# Patient Record
Sex: MALE | Race: WHITE | NOT HISPANIC OR LATINO | Employment: OTHER | URBAN - METROPOLITAN AREA
[De-identification: names, ages, dates, MRNs, and addresses within clinical notes are randomized per-mention and may not be internally consistent; named-entity substitution may affect disease eponyms.]

---

## 2017-06-13 ENCOUNTER — HOSPITAL ENCOUNTER (OUTPATIENT)
Dept: LAB | Facility: HOSPITAL | Age: 82
Setting detail: SPECIMEN
Discharge: HOME | End: 2017-06-13
Attending: INTERNAL MEDICINE | Admitting: INTERNAL MEDICINE

## 2017-06-13 LAB
ALBUMIN SERPL-MCNC: 3.6 G/DL (ref 3.5–4.8)
ALBUMIN/GLOB SERPL: 1 {RATIO} (ref 1–1.7)
ALP SERPL-CCNC: 55 IU/L (ref 32–91)
ALT SERPL-CCNC: 13 IU/L (ref 17–63)
ANION GAP SERPL CALC-SCNC: 11.7 MMOL/L (ref 10–20)
AST SERPL-CCNC: 16 IU/L (ref 15–41)
BILIRUB SERPL-MCNC: 0.9 MG/DL (ref 0.3–1.2)
BUN SERPL-MCNC: 15 MG/DL (ref 8–20)
BUN/CREAT SERPL: 10.7 (ref 6.2–20.3)
CALCIUM SERPL-MCNC: 9 MG/DL (ref 8.9–10.3)
CHLORIDE SERPL-SCNC: 104 MMOL/L (ref 101–111)
CHOLEST SERPL-MCNC: 104 MG/DL
CHOLEST/HDLC SERPL: 3.2 {RATIO}
CONV CO2: 28 MMOL/L (ref 22–32)
CONV LDL CHOLESTEROL DIRECT: 59 MG/DL (ref 0–100)
CONV MICROALBUM.,U,RANDOM: 15 MG/L
CONV TOTAL PROTEIN: 7.3 G/DL (ref 6.1–7.9)
CREAT 24H UR-MCNC: 56.2 MG/DL
CREAT UR-MCNC: 1.4 MG/DL (ref 0.7–1.2)
GLOBULIN UR ELPH-MCNC: 3.7 G/DL (ref 2.5–3.8)
GLUCOSE SERPL-MCNC: 89 MG/DL (ref 65–99)
HDLC SERPL-MCNC: 33 MG/DL
LDLC/HDLC SERPL: 1.8 {RATIO}
LIPID INTERPRETATION: ABNORMAL
MICROALBUMIN/CREAT UR: 26.7 UG/MG
POTASSIUM SERPL-SCNC: 3.7 MMOL/L (ref 3.6–5.1)
SODIUM SERPL-SCNC: 140 MMOL/L (ref 136–144)
TRIGL SERPL-MCNC: 66 MG/DL
VLDLC SERPL CALC-MCNC: 12 MG/DL

## 2017-10-27 ENCOUNTER — HOSPITAL ENCOUNTER (OUTPATIENT)
Dept: LAB | Facility: HOSPITAL | Age: 82
Setting detail: SPECIMEN
Discharge: HOME | End: 2017-10-27
Attending: INTERNAL MEDICINE | Admitting: INTERNAL MEDICINE

## 2017-10-27 LAB
ALBUMIN SERPL-MCNC: 3.5 G/DL (ref 3.5–4.8)
ALBUMIN/GLOB SERPL: 0.8 {RATIO} (ref 1–1.7)
ALP SERPL-CCNC: 61 IU/L (ref 32–91)
ALT SERPL-CCNC: 10 IU/L (ref 17–63)
ANION GAP SERPL CALC-SCNC: 10.9 MMOL/L (ref 10–20)
AST SERPL-CCNC: 14 IU/L (ref 15–41)
BILIRUB SERPL-MCNC: 0.7 MG/DL (ref 0.3–1.2)
BUN SERPL-MCNC: 18 MG/DL (ref 8–20)
BUN/CREAT SERPL: 13.8 (ref 6.2–20.3)
CALCIUM SERPL-MCNC: 8.9 MG/DL (ref 8.9–10.3)
CHLORIDE SERPL-SCNC: 101 MMOL/L (ref 101–111)
CONV CO2: 28 MMOL/L (ref 22–32)
CONV TOTAL PROTEIN: 7.7 G/DL (ref 6.1–7.9)
CREAT UR-MCNC: 1.3 MG/DL (ref 0.7–1.2)
GLOBULIN UR ELPH-MCNC: 4.2 G/DL (ref 2.5–3.8)
GLUCOSE SERPL-MCNC: 256 MG/DL (ref 65–99)
POTASSIUM SERPL-SCNC: 3.9 MMOL/L (ref 3.6–5.1)
SODIUM SERPL-SCNC: 136 MMOL/L (ref 136–144)

## 2017-12-17 ENCOUNTER — ON CAMPUS - OUTPATIENT (OUTPATIENT)
Dept: URBAN - METROPOLITAN AREA HOSPITAL 85 | Facility: HOSPITAL | Age: 82
End: 2017-12-17

## 2017-12-17 DIAGNOSIS — K62.5 HEMORRHAGE OF ANUS AND RECTUM: ICD-10-CM

## 2017-12-17 PROCEDURE — 99203 OFFICE O/P NEW LOW 30 MIN: CPT | Performed by: INTERNAL MEDICINE

## 2017-12-18 ENCOUNTER — HOSPITAL ENCOUNTER (OUTPATIENT)
Dept: LAB | Facility: HOSPITAL | Age: 82
Setting detail: SPECIMEN
Discharge: HOME | End: 2017-12-18
Attending: FAMILY MEDICINE | Admitting: FAMILY MEDICINE

## 2017-12-18 LAB
BASOPHILS # BLD AUTO: 0.1 10*3/UL (ref 0–0.2)
BASOPHILS NFR BLD AUTO: 1 % (ref 0–2)
DIFFERENTIAL METHOD BLD: (no result)
EOSINOPHIL # BLD AUTO: 0.2 10*3/UL (ref 0–0.3)
EOSINOPHIL # BLD AUTO: 2 % (ref 0–3)
ERYTHROCYTE [DISTWIDTH] IN BLOOD BY AUTOMATED COUNT: 16 % (ref 11.5–14.5)
HCT VFR BLD AUTO: 31.5 % (ref 40–54)
HGB BLD-MCNC: 9.7 G/DL (ref 14–18)
LYMPHOCYTES # BLD AUTO: 1.5 10*3/UL (ref 0.8–4.8)
LYMPHOCYTES NFR BLD AUTO: 19 % (ref 18–42)
MCH RBC QN AUTO: 23.8 PG (ref 26–32)
MCHC RBC AUTO-ENTMCNC: 30.7 G/DL (ref 32–36)
MCV RBC AUTO: 77.8 FL (ref 80–94)
MONOCYTES # BLD AUTO: 0.5 10*3/UL (ref 0.1–1.3)
MONOCYTES NFR BLD AUTO: 7 % (ref 2–11)
NEUTROPHILS # BLD AUTO: 5.5 10*3/UL (ref 2.3–8.6)
NEUTROPHILS NFR BLD AUTO: 71 % (ref 50–75)
NRBC BLD AUTO-RTO: 0 /100{WBCS}
NRBC/RBC NFR BLD MANUAL: 0 10*3/UL
PLATELET # BLD AUTO: 240 10*3/UL (ref 150–450)
PMV BLD AUTO: 9.5 FL (ref 7.4–10.4)
RBC # BLD AUTO: 4.05 10*6/UL (ref 4.6–6)
WBC # BLD AUTO: 7.7 10*3/UL (ref 4.5–11.5)

## 2017-12-21 ENCOUNTER — OFFICE (OUTPATIENT)
Dept: URBAN - METROPOLITAN AREA CLINIC 64 | Facility: CLINIC | Age: 82
End: 2017-12-21

## 2017-12-21 VITALS
HEIGHT: 70 IN | DIASTOLIC BLOOD PRESSURE: 69 MMHG | SYSTOLIC BLOOD PRESSURE: 121 MMHG | HEART RATE: 60 BPM | WEIGHT: 203 LBS

## 2017-12-21 DIAGNOSIS — D50.9 IRON DEFICIENCY ANEMIA, UNSPECIFIED: ICD-10-CM

## 2017-12-21 DIAGNOSIS — I48.91 UNSPECIFIED ATRIAL FIBRILLATION: ICD-10-CM

## 2017-12-21 DIAGNOSIS — Z79.01 LONG TERM (CURRENT) USE OF ANTICOAGULANTS: ICD-10-CM

## 2017-12-21 DIAGNOSIS — K62.5 HEMORRHAGE OF ANUS AND RECTUM: ICD-10-CM

## 2017-12-21 PROCEDURE — 99214 OFFICE O/P EST MOD 30 MIN: CPT | Performed by: NURSE PRACTITIONER

## 2017-12-21 RX ORDER — FERROUS SULFATE TAB EC 325 MG (65 MG FE EQUIVALENT) 325 (65 FE) MG
650 TABLET DELAYED RESPONSE ORAL
Qty: 180 | Refills: 1 | Status: ACTIVE
Start: 2017-12-21

## 2018-05-02 ENCOUNTER — HOSPITAL ENCOUNTER (OUTPATIENT)
Dept: LAB | Facility: HOSPITAL | Age: 83
Setting detail: SPECIMEN
Discharge: HOME | End: 2018-05-02
Attending: INTERNAL MEDICINE | Admitting: INTERNAL MEDICINE

## 2018-05-02 LAB
ALBUMIN SERPL-MCNC: 3.5 G/DL (ref 3.5–4.8)
ALBUMIN/GLOB SERPL: 0.9 {RATIO} (ref 1–1.7)
ALP SERPL-CCNC: 61 IU/L (ref 32–91)
ALT SERPL-CCNC: 10 IU/L (ref 17–63)
ANION GAP SERPL CALC-SCNC: 8.8 MMOL/L (ref 10–20)
AST SERPL-CCNC: 12 IU/L (ref 15–41)
BILIRUB SERPL-MCNC: 1 MG/DL (ref 0.3–1.2)
BUN SERPL-MCNC: 13 MG/DL (ref 8–20)
BUN/CREAT SERPL: 10 (ref 6.2–20.3)
CALCIUM SERPL-MCNC: 8.9 MG/DL (ref 8.9–10.3)
CHLORIDE SERPL-SCNC: 104 MMOL/L (ref 101–111)
CHOLEST SERPL-MCNC: 105 MG/DL
CHOLEST/HDLC SERPL: 3.4 {RATIO}
CONV CO2: 28 MMOL/L (ref 22–32)
CONV LDL CHOLESTEROL DIRECT: 63 MG/DL (ref 0–100)
CONV TOTAL PROTEIN: 7.6 G/DL (ref 6.1–7.9)
CREAT UR-MCNC: 1.3 MG/DL (ref 0.7–1.2)
GLOBULIN UR ELPH-MCNC: 4.1 G/DL (ref 2.5–3.8)
GLUCOSE SERPL-MCNC: 239 MG/DL (ref 65–99)
HDLC SERPL-MCNC: 31 MG/DL
LDLC/HDLC SERPL: 2.1 {RATIO}
LIPID INTERPRETATION: ABNORMAL
POTASSIUM SERPL-SCNC: 3.8 MMOL/L (ref 3.6–5.1)
SODIUM SERPL-SCNC: 137 MMOL/L (ref 136–144)
TRIGL SERPL-MCNC: 74 MG/DL
VLDLC SERPL CALC-MCNC: 10.8 MG/DL

## 2018-05-23 ENCOUNTER — HOSPITAL ENCOUNTER (OUTPATIENT)
Dept: GENERAL RADIOLOGY | Facility: HOSPITAL | Age: 83
Discharge: HOME | End: 2018-05-23
Attending: NURSE PRACTITIONER | Admitting: NURSE PRACTITIONER

## 2018-07-05 ENCOUNTER — HOSPITAL ENCOUNTER (OUTPATIENT)
Dept: GENERAL RADIOLOGY | Facility: HOSPITAL | Age: 83
Discharge: HOME | End: 2018-07-05
Attending: FAMILY MEDICINE | Admitting: FAMILY MEDICINE

## 2018-11-21 ENCOUNTER — INPATIENT HOSPITAL (OUTPATIENT)
Dept: URBAN - METROPOLITAN AREA HOSPITAL 84 | Facility: HOSPITAL | Age: 83
End: 2018-11-21
Payer: COMMERCIAL

## 2018-11-21 DIAGNOSIS — K92.1 MELENA: ICD-10-CM

## 2018-11-21 DIAGNOSIS — K57.30 DIVERTICULOSIS OF LARGE INTESTINE WITHOUT PERFORATION OR ABS: ICD-10-CM

## 2018-11-21 DIAGNOSIS — Z90.49 ACQUIRED ABSENCE OF OTHER SPECIFIED PARTS OF DIGESTIVE TRACT: ICD-10-CM

## 2018-11-21 PROCEDURE — 45378 DIAGNOSTIC COLONOSCOPY: CPT | Performed by: INTERNAL MEDICINE

## 2020-01-01 ENCOUNTER — APPOINTMENT (OUTPATIENT)
Dept: CT IMAGING | Facility: HOSPITAL | Age: 85
End: 2020-01-01

## 2020-01-01 ENCOUNTER — READMISSION MANAGEMENT (OUTPATIENT)
Dept: CALL CENTER | Facility: HOSPITAL | Age: 85
End: 2020-01-01

## 2020-01-01 ENCOUNTER — INPATIENT HOSPITAL (OUTPATIENT)
Dept: URBAN - METROPOLITAN AREA HOSPITAL 84 | Facility: HOSPITAL | Age: 85
End: 2020-01-01

## 2020-01-01 ENCOUNTER — APPOINTMENT (OUTPATIENT)
Dept: ULTRASOUND IMAGING | Facility: HOSPITAL | Age: 85
End: 2020-01-01

## 2020-01-01 ENCOUNTER — HOSPITAL ENCOUNTER (EMERGENCY)
Facility: HOSPITAL | Age: 85
Discharge: HOME | End: 2020-12-10
Admitting: EMERGENCY MEDICINE

## 2020-01-01 ENCOUNTER — HOSPITAL ENCOUNTER (EMERGENCY)
Facility: HOSPITAL | Age: 85
Discharge: HOME | End: 2020-10-17
Admitting: EMERGENCY MEDICINE

## 2020-01-01 ENCOUNTER — OFFICE VISIT (OUTPATIENT)
Dept: ENDOCRINOLOGY | Facility: CLINIC | Age: 85
End: 2020-01-01

## 2020-01-01 ENCOUNTER — TELEPHONE (OUTPATIENT)
Dept: ENDOCRINOLOGY | Facility: CLINIC | Age: 85
End: 2020-01-01

## 2020-01-01 ENCOUNTER — HOSPITAL ENCOUNTER (OUTPATIENT)
Facility: HOSPITAL | Age: 85
Setting detail: OBSERVATION
Discharge: HOME | End: 2020-08-26
Attending: EMERGENCY MEDICINE | Admitting: FAMILY MEDICINE

## 2020-01-01 ENCOUNTER — HOSPITAL ENCOUNTER (INPATIENT)
Facility: HOSPITAL | Age: 85
LOS: 1 days | Discharge: HOME | End: 2020-11-25
Attending: EMERGENCY MEDICINE | Admitting: FAMILY MEDICINE

## 2020-01-01 VITALS
BODY MASS INDEX: 26.35 KG/M2 | TEMPERATURE: 98.7 F | WEIGHT: 184.08 LBS | OXYGEN SATURATION: 97 % | DIASTOLIC BLOOD PRESSURE: 67 MMHG | HEIGHT: 70 IN | HEART RATE: 71 BPM | RESPIRATION RATE: 16 BRPM | SYSTOLIC BLOOD PRESSURE: 110 MMHG

## 2020-01-01 VITALS
RESPIRATION RATE: 14 BRPM | OXYGEN SATURATION: 94 % | HEART RATE: 81 BPM | DIASTOLIC BLOOD PRESSURE: 78 MMHG | BODY MASS INDEX: 27.11 KG/M2 | HEIGHT: 70 IN | WEIGHT: 189.38 LBS | TEMPERATURE: 98.2 F | SYSTOLIC BLOOD PRESSURE: 122 MMHG

## 2020-01-01 VITALS
TEMPERATURE: 97.7 F | BODY MASS INDEX: 26.92 KG/M2 | OXYGEN SATURATION: 94 % | HEART RATE: 72 BPM | WEIGHT: 188 LBS | SYSTOLIC BLOOD PRESSURE: 118 MMHG | RESPIRATION RATE: 16 BRPM | DIASTOLIC BLOOD PRESSURE: 76 MMHG | HEIGHT: 70 IN

## 2020-01-01 VITALS
HEIGHT: 70 IN | OXYGEN SATURATION: 96 % | BODY MASS INDEX: 28.06 KG/M2 | HEART RATE: 76 BPM | SYSTOLIC BLOOD PRESSURE: 122 MMHG | WEIGHT: 196 LBS | DIASTOLIC BLOOD PRESSURE: 70 MMHG | TEMPERATURE: 97.3 F

## 2020-01-01 VITALS
DIASTOLIC BLOOD PRESSURE: 93 MMHG | OXYGEN SATURATION: 97 % | WEIGHT: 197.09 LBS | SYSTOLIC BLOOD PRESSURE: 173 MMHG | BODY MASS INDEX: 28.22 KG/M2 | HEART RATE: 71 BPM | HEIGHT: 70 IN | TEMPERATURE: 97.4 F | RESPIRATION RATE: 15 BRPM

## 2020-01-01 DIAGNOSIS — N17.9 ACUTE KIDNEY INJURY (HCC): ICD-10-CM

## 2020-01-01 DIAGNOSIS — Z79.4 TYPE 2 DIABETES MELLITUS WITH HYPERGLYCEMIA, WITH LONG-TERM CURRENT USE OF INSULIN (HCC): ICD-10-CM

## 2020-01-01 DIAGNOSIS — E11.65 TYPE 2 DIABETES MELLITUS WITH HYPERGLYCEMIA, WITH LONG-TERM CURRENT USE OF INSULIN (HCC): ICD-10-CM

## 2020-01-01 DIAGNOSIS — R73.9 HYPERGLYCEMIA: Primary | ICD-10-CM

## 2020-01-01 DIAGNOSIS — E78.2 MIXED HYPERLIPIDEMIA: ICD-10-CM

## 2020-01-01 DIAGNOSIS — Z79.4 TYPE 2 DIABETES MELLITUS WITH HYPERGLYCEMIA, WITH LONG-TERM CURRENT USE OF INSULIN (HCC): Primary | ICD-10-CM

## 2020-01-01 DIAGNOSIS — Z85.038 PERSONAL HISTORY OF OTHER MALIGNANT NEOPLASM OF LARGE INTEST: ICD-10-CM

## 2020-01-01 DIAGNOSIS — K92.2 GASTROINTESTINAL HEMORRHAGE, UNSPECIFIED GASTROINTESTINAL HEMORRHAGE TYPE: Primary | ICD-10-CM

## 2020-01-01 DIAGNOSIS — K62.5 HEMORRHAGE OF ANUS AND RECTUM: ICD-10-CM

## 2020-01-01 DIAGNOSIS — K57.30 DIVERTICULOSIS OF LARGE INTESTINE WITHOUT PERFORATION OR ABS: ICD-10-CM

## 2020-01-01 DIAGNOSIS — R47.1 DYSARTHRIA: ICD-10-CM

## 2020-01-01 DIAGNOSIS — I10 ESSENTIAL HYPERTENSION: ICD-10-CM

## 2020-01-01 DIAGNOSIS — E11.65 TYPE 2 DIABETES MELLITUS WITH HYPERGLYCEMIA, WITH LONG-TERM CURRENT USE OF INSULIN (HCC): Primary | ICD-10-CM

## 2020-01-01 DIAGNOSIS — R93.3 ABNORMAL FINDINGS ON DIAGNOSTIC IMAGING OF OTHER PARTS OF DI: ICD-10-CM

## 2020-01-01 DIAGNOSIS — E87.6 HYPOKALEMIA: ICD-10-CM

## 2020-01-01 DIAGNOSIS — G51.0 BELL'S PALSY: Primary | ICD-10-CM

## 2020-01-01 LAB
ABO GROUP BLD: NORMAL
ACETONE BLD QL: NEGATIVE
ALBUMIN SERPL-MCNC: 3.2 G/DL (ref 3.5–5.2)
ALBUMIN SERPL-MCNC: 3.2 G/DL (ref 3.5–5.2)
ALBUMIN SERPL-MCNC: 3.3 G/DL (ref 3.5–5.2)
ALBUMIN SERPL-MCNC: 3.8 G/DL (ref 3.5–5.2)
ALBUMIN SERPL-MCNC: 4 G/DL (ref 3.5–5.2)
ALBUMIN/GLOB SERPL: 0.9 G/DL
ALBUMIN/GLOB SERPL: 1 G/DL
ALBUMIN/GLOB SERPL: 1.1 G/DL
ALBUMIN/GLOB SERPL: 1.2 G/DL
ALP SERPL-CCNC: 61 U/L (ref 39–117)
ALP SERPL-CCNC: 71 U/L (ref 39–117)
ALP SERPL-CCNC: 72 U/L (ref 39–117)
ALP SERPL-CCNC: 76 U/L (ref 39–117)
ALT SERPL W P-5'-P-CCNC: 10 U/L (ref 1–41)
ALT SERPL W P-5'-P-CCNC: 12 U/L (ref 1–41)
ALT SERPL W P-5'-P-CCNC: 12 U/L (ref 1–41)
ALT SERPL W P-5'-P-CCNC: 17 U/L (ref 1–41)
ANION GAP SERPL CALCULATED.3IONS-SCNC: 10 MMOL/L (ref 5–15)
ANION GAP SERPL CALCULATED.3IONS-SCNC: 10 MMOL/L (ref 5–15)
ANION GAP SERPL CALCULATED.3IONS-SCNC: 11 MMOL/L (ref 5–15)
ANION GAP SERPL CALCULATED.3IONS-SCNC: 11 MMOL/L (ref 5–15)
ANION GAP SERPL CALCULATED.3IONS-SCNC: 12 MMOL/L (ref 5–15)
ANION GAP SERPL CALCULATED.3IONS-SCNC: 12 MMOL/L (ref 5–15)
ANION GAP SERPL CALCULATED.3IONS-SCNC: 9 MMOL/L (ref 5–15)
ANISOCYTOSIS BLD QL: ABNORMAL
APTT PPP: 28.6 SECONDS (ref 24–31)
APTT PPP: 29.3 SECONDS (ref 24–31)
ARTERIAL PATENCY WRIST A: POSITIVE
AST SERPL-CCNC: 10 U/L (ref 1–40)
AST SERPL-CCNC: 13 U/L (ref 1–40)
AST SERPL-CCNC: 17 U/L (ref 1–40)
AST SERPL-CCNC: 25 U/L (ref 1–40)
ATMOSPHERIC PRESS: ABNORMAL MM[HG]
BACTERIA UR QL AUTO: ABNORMAL /HPF
BACTERIA UR QL AUTO: ABNORMAL /HPF
BASE EXCESS BLDA CALC-SCNC: -1.4 MMOL/L (ref 0–3)
BASOPHILS # BLD AUTO: 0 10*3/MM3 (ref 0–0.2)
BASOPHILS # BLD AUTO: 0.1 10*3/MM3 (ref 0–0.2)
BASOPHILS # BLD AUTO: 0.1 10*3/MM3 (ref 0–0.2)
BASOPHILS # BLD MANUAL: 0.11 10*3/MM3 (ref 0–0.2)
BASOPHILS NFR BLD AUTO: 0.4 % (ref 0–1.5)
BASOPHILS NFR BLD AUTO: 0.4 % (ref 0–1.5)
BASOPHILS NFR BLD AUTO: 0.5 % (ref 0–1.5)
BASOPHILS NFR BLD AUTO: 0.9 % (ref 0–1.5)
BASOPHILS NFR BLD AUTO: 1 % (ref 0–1.5)
BASOPHILS NFR BLD AUTO: 1 % (ref 0–1.5)
BDY SITE: ABNORMAL
BILIRUB SERPL-MCNC: 0.5 MG/DL (ref 0–1.2)
BILIRUB SERPL-MCNC: 0.5 MG/DL (ref 0–1.2)
BILIRUB SERPL-MCNC: 0.8 MG/DL (ref 0–1.2)
BILIRUB SERPL-MCNC: 0.9 MG/DL (ref 0–1.2)
BILIRUB UR QL STRIP: NEGATIVE
BLD GP AB SCN SERPL QL: NEGATIVE
BUN SERPL-MCNC: 21 MG/DL (ref 8–23)
BUN SERPL-MCNC: 24 MG/DL (ref 8–23)
BUN SERPL-MCNC: 24 MG/DL (ref 8–23)
BUN SERPL-MCNC: 45 MG/DL (ref 8–23)
BUN SERPL-MCNC: 49 MG/DL (ref 8–23)
BUN SERPL-MCNC: 57 MG/DL (ref 8–23)
BUN SERPL-MCNC: 58 MG/DL (ref 8–23)
BUN SERPL-MCNC: ABNORMAL MG/DL
BUN/CREAT SERPL: 15.2 (ref 7–25)
BUN/CREAT SERPL: 15.4 (ref 7–25)
BUN/CREAT SERPL: 15.8 (ref 7–25)
BUN/CREAT SERPL: ABNORMAL
CALCIUM SPEC-SCNC: 8 MG/DL (ref 8.2–9.6)
CALCIUM SPEC-SCNC: 8 MG/DL (ref 8.2–9.6)
CALCIUM SPEC-SCNC: 8.1 MG/DL (ref 8.2–9.6)
CALCIUM SPEC-SCNC: 8.1 MG/DL (ref 8.2–9.6)
CALCIUM SPEC-SCNC: 8.2 MG/DL (ref 8.2–9.6)
CALCIUM SPEC-SCNC: 8.7 MG/DL (ref 8.2–9.6)
CALCIUM SPEC-SCNC: 8.9 MG/DL (ref 8.2–9.6)
CHLORIDE SERPL-SCNC: 100 MMOL/L (ref 98–107)
CHLORIDE SERPL-SCNC: 100 MMOL/L (ref 98–107)
CHLORIDE SERPL-SCNC: 105 MMOL/L (ref 98–107)
CHLORIDE SERPL-SCNC: 107 MMOL/L (ref 98–107)
CHLORIDE SERPL-SCNC: 91 MMOL/L (ref 98–107)
CHLORIDE SERPL-SCNC: 96 MMOL/L (ref 98–107)
CHLORIDE SERPL-SCNC: 98 MMOL/L (ref 98–107)
CLARITY UR: ABNORMAL
CLARITY UR: CLEAR
CLARITY UR: CLEAR
CO2 BLDA-SCNC: 24.1 MMOL/L (ref 22–29)
CO2 SERPL-SCNC: 23 MMOL/L (ref 22–29)
CO2 SERPL-SCNC: 24 MMOL/L (ref 22–29)
CO2 SERPL-SCNC: 24 MMOL/L (ref 22–29)
CO2 SERPL-SCNC: 27 MMOL/L (ref 22–29)
CO2 SERPL-SCNC: 27 MMOL/L (ref 22–29)
CO2 SERPL-SCNC: 29 MMOL/L (ref 22–29)
CO2 SERPL-SCNC: 29 MMOL/L (ref 22–29)
COLOR UR: YELLOW
CREAT SERPL-MCNC: 1.38 MG/DL (ref 0.76–1.27)
CREAT SERPL-MCNC: 1.52 MG/DL (ref 0.76–1.27)
CREAT SERPL-MCNC: 1.56 MG/DL (ref 0.76–1.27)
CREAT SERPL-MCNC: 1.72 MG/DL (ref 0.76–1.27)
CREAT SERPL-MCNC: 1.79 MG/DL (ref 0.76–1.27)
CREAT SERPL-MCNC: 1.91 MG/DL (ref 0.76–1.27)
CREAT SERPL-MCNC: 2.05 MG/DL (ref 0.76–1.27)
DEPRECATED RDW RBC AUTO: 45.5 FL (ref 37–54)
DEPRECATED RDW RBC AUTO: 45.9 FL (ref 37–54)
DEPRECATED RDW RBC AUTO: 46.4 FL (ref 37–54)
DEPRECATED RDW RBC AUTO: 46.4 FL (ref 37–54)
DEPRECATED RDW RBC AUTO: 49 FL (ref 37–54)
DEPRECATED RDW RBC AUTO: 49.4 FL (ref 37–54)
DEPRECATED RDW RBC AUTO: 51.2 FL (ref 37–54)
EOSINOPHIL # BLD AUTO: 0.2 10*3/MM3 (ref 0–0.4)
EOSINOPHIL # BLD AUTO: 0.3 10*3/MM3 (ref 0–0.4)
EOSINOPHIL # BLD AUTO: 0.4 10*3/MM3 (ref 0–0.4)
EOSINOPHIL # BLD MANUAL: 0.11 10*3/MM3 (ref 0–0.4)
EOSINOPHIL NFR BLD AUTO: 2.2 % (ref 0.3–6.2)
EOSINOPHIL NFR BLD AUTO: 2.4 % (ref 0.3–6.2)
EOSINOPHIL NFR BLD AUTO: 2.7 % (ref 0.3–6.2)
EOSINOPHIL NFR BLD AUTO: 3.3 % (ref 0.3–6.2)
EOSINOPHIL NFR BLD AUTO: 3.8 % (ref 0.3–6.2)
EOSINOPHIL NFR BLD MANUAL: 1 % (ref 0.3–6.2)
ERYTHROCYTE [DISTWIDTH] IN BLOOD BY AUTOMATED COUNT: 15.2 % (ref 12.3–15.4)
ERYTHROCYTE [DISTWIDTH] IN BLOOD BY AUTOMATED COUNT: 15.2 % (ref 12.3–15.4)
ERYTHROCYTE [DISTWIDTH] IN BLOOD BY AUTOMATED COUNT: 15.4 % (ref 12.3–15.4)
ERYTHROCYTE [DISTWIDTH] IN BLOOD BY AUTOMATED COUNT: 15.6 % (ref 12.3–15.4)
ERYTHROCYTE [DISTWIDTH] IN BLOOD BY AUTOMATED COUNT: 16.3 % (ref 12.3–15.4)
ERYTHROCYTE [DISTWIDTH] IN BLOOD BY AUTOMATED COUNT: 16.4 % (ref 12.3–15.4)
ERYTHROCYTE [DISTWIDTH] IN BLOOD BY AUTOMATED COUNT: 16.8 % (ref 12.3–15.4)
GFR SERPL CREATININE-BSD FRML MDRD: 30 ML/MIN/1.73
GFR SERPL CREATININE-BSD FRML MDRD: 33 ML/MIN/1.73
GFR SERPL CREATININE-BSD FRML MDRD: 36 ML/MIN/1.73
GFR SERPL CREATININE-BSD FRML MDRD: 37 ML/MIN/1.73
GFR SERPL CREATININE-BSD FRML MDRD: 42 ML/MIN/1.73
GFR SERPL CREATININE-BSD FRML MDRD: 43 ML/MIN/1.73
GFR SERPL CREATININE-BSD FRML MDRD: 48 ML/MIN/1.73
GLOBULIN UR ELPH-MCNC: 2.9 GM/DL
GLOBULIN UR ELPH-MCNC: 3.4 GM/DL
GLOBULIN UR ELPH-MCNC: 3.7 GM/DL
GLOBULIN UR ELPH-MCNC: 3.7 GM/DL
GLUCOSE BLDC GLUCOMTR-MCNC: 110 MG/DL (ref 70–105)
GLUCOSE BLDC GLUCOMTR-MCNC: 124 MG/DL (ref 70–105)
GLUCOSE BLDC GLUCOMTR-MCNC: 137 MG/DL (ref 70–105)
GLUCOSE BLDC GLUCOMTR-MCNC: 137 MG/DL (ref 70–105)
GLUCOSE BLDC GLUCOMTR-MCNC: 148 MG/DL (ref 70–105)
GLUCOSE BLDC GLUCOMTR-MCNC: 170 MG/DL (ref 70–105)
GLUCOSE BLDC GLUCOMTR-MCNC: 171 MG/DL (ref 70–105)
GLUCOSE BLDC GLUCOMTR-MCNC: 175 MG/DL (ref 70–105)
GLUCOSE BLDC GLUCOMTR-MCNC: 191 MG/DL (ref 70–105)
GLUCOSE BLDC GLUCOMTR-MCNC: 209 MG/DL (ref 70–105)
GLUCOSE BLDC GLUCOMTR-MCNC: 230 MG/DL (ref 70–105)
GLUCOSE BLDC GLUCOMTR-MCNC: 288 MG/DL (ref 70–105)
GLUCOSE BLDC GLUCOMTR-MCNC: 322 MG/DL (ref 70–105)
GLUCOSE BLDC GLUCOMTR-MCNC: 352 MG/DL (ref 70–105)
GLUCOSE BLDC GLUCOMTR-MCNC: 411 MG/DL (ref 70–105)
GLUCOSE BLDC GLUCOMTR-MCNC: 453 MG/DL (ref 70–105)
GLUCOSE BLDC GLUCOMTR-MCNC: 71 MG/DL (ref 70–105)
GLUCOSE BLDC GLUCOMTR-MCNC: >500 MG/DL (ref 70–105)
GLUCOSE SERPL-MCNC: 119 MG/DL (ref 65–99)
GLUCOSE SERPL-MCNC: 208 MG/DL (ref 65–99)
GLUCOSE SERPL-MCNC: 222 MG/DL (ref 65–99)
GLUCOSE SERPL-MCNC: 294 MG/DL (ref 65–99)
GLUCOSE SERPL-MCNC: 512 MG/DL (ref 65–99)
GLUCOSE SERPL-MCNC: 565 MG/DL (ref 65–99)
GLUCOSE SERPL-MCNC: 71 MG/DL (ref 65–99)
GLUCOSE UR STRIP-MCNC: ABNORMAL MG/DL
HBA1C MFR BLD: 11.9 % (ref 3.5–5.6)
HCO3 BLDA-SCNC: 23 MMOL/L (ref 21–28)
HCT VFR BLD AUTO: 35.8 % (ref 37.5–51)
HCT VFR BLD AUTO: 37.8 % (ref 37.5–51)
HCT VFR BLD AUTO: 38.8 % (ref 37.5–51)
HCT VFR BLD AUTO: 39.4 % (ref 37.5–51)
HCT VFR BLD AUTO: 40.3 % (ref 37.5–51)
HCT VFR BLD AUTO: 41.4 % (ref 37.5–51)
HCT VFR BLD AUTO: 41.4 % (ref 37.5–51)
HCT VFR BLD AUTO: 41.7 % (ref 37.5–51)
HEMODILUTION: NO
HGB BLD-MCNC: 11.4 G/DL (ref 13–17.7)
HGB BLD-MCNC: 11.9 G/DL (ref 13–17.7)
HGB BLD-MCNC: 12 G/DL (ref 13–17.7)
HGB BLD-MCNC: 13 G/DL (ref 13–17.7)
HGB BLD-MCNC: 13.2 G/DL (ref 13–17.7)
HGB BLD-MCNC: 13.7 G/DL (ref 13–17.7)
HGB BLD-MCNC: 14 G/DL (ref 13–17.7)
HGB BLD-MCNC: 14 G/DL (ref 13–17.7)
HGB UR QL STRIP.AUTO: ABNORMAL
HGB UR QL STRIP.AUTO: ABNORMAL
HGB UR QL STRIP.AUTO: NEGATIVE
HOLD SPECIMEN: NORMAL
HYALINE CASTS UR QL AUTO: ABNORMAL /LPF
HYALINE CASTS UR QL AUTO: ABNORMAL /LPF
INHALED O2 CONCENTRATION: 21 %
INR PPP: 1.04 (ref 0.93–1.1)
INR PPP: 1.06 (ref 0.93–1.1)
INR PPP: 1.09 (ref 2–3)
KETONES UR QL STRIP: NEGATIVE
LARGE PLATELETS: ABNORMAL
LEUKOCYTE ESTERASE UR QL STRIP.AUTO: NEGATIVE
LYMPHOCYTES # BLD AUTO: 0.9 10*3/MM3 (ref 0.7–3.1)
LYMPHOCYTES # BLD AUTO: 1.2 10*3/MM3 (ref 0.7–3.1)
LYMPHOCYTES # BLD AUTO: 1.3 10*3/MM3 (ref 0.7–3.1)
LYMPHOCYTES # BLD AUTO: 1.3 10*3/MM3 (ref 0.7–3.1)
LYMPHOCYTES # BLD AUTO: 1.8 10*3/MM3 (ref 0.7–3.1)
LYMPHOCYTES # BLD MANUAL: 1.76 10*3/MM3 (ref 0.7–3.1)
LYMPHOCYTES NFR BLD AUTO: 11.3 % (ref 19.6–45.3)
LYMPHOCYTES NFR BLD AUTO: 12 % (ref 19.6–45.3)
LYMPHOCYTES NFR BLD AUTO: 12.2 % (ref 19.6–45.3)
LYMPHOCYTES NFR BLD AUTO: 14.5 % (ref 19.6–45.3)
LYMPHOCYTES NFR BLD AUTO: 16.5 % (ref 19.6–45.3)
LYMPHOCYTES NFR BLD MANUAL: 16 % (ref 19.6–45.3)
LYMPHOCYTES NFR BLD MANUAL: 5 % (ref 5–12)
MAGNESIUM SERPL-MCNC: 2 MG/DL (ref 1.7–2.3)
MAGNESIUM SERPL-MCNC: 2.1 MG/DL (ref 1.7–2.3)
MCH RBC QN AUTO: 26.9 PG (ref 26.6–33)
MCH RBC QN AUTO: 27 PG (ref 26.6–33)
MCH RBC QN AUTO: 27.5 PG (ref 26.6–33)
MCH RBC QN AUTO: 28.4 PG (ref 26.6–33)
MCH RBC QN AUTO: 28.8 PG (ref 26.6–33)
MCHC RBC AUTO-ENTMCNC: 31 G/DL (ref 31.5–35.7)
MCHC RBC AUTO-ENTMCNC: 32 G/DL (ref 31.5–35.7)
MCHC RBC AUTO-ENTMCNC: 32.1 G/DL (ref 31.5–35.7)
MCHC RBC AUTO-ENTMCNC: 33.1 G/DL (ref 31.5–35.7)
MCHC RBC AUTO-ENTMCNC: 33.4 G/DL (ref 31.5–35.7)
MCHC RBC AUTO-ENTMCNC: 33.5 G/DL (ref 31.5–35.7)
MCHC RBC AUTO-ENTMCNC: 33.7 G/DL (ref 31.5–35.7)
MCV RBC AUTO: 84.1 FL (ref 79–97)
MCV RBC AUTO: 84.5 FL (ref 79–97)
MCV RBC AUTO: 84.9 FL (ref 79–97)
MCV RBC AUTO: 85.5 FL (ref 79–97)
MCV RBC AUTO: 85.8 FL (ref 79–97)
MCV RBC AUTO: 85.8 FL (ref 79–97)
MCV RBC AUTO: 86.9 FL (ref 79–97)
MODALITY: ABNORMAL
MONOCYTES # BLD AUTO: 0.5 10*3/MM3 (ref 0.1–0.9)
MONOCYTES # BLD AUTO: 0.5 10*3/MM3 (ref 0.1–0.9)
MONOCYTES # BLD AUTO: 0.55 10*3/MM3 (ref 0.1–0.9)
MONOCYTES # BLD AUTO: 0.6 10*3/MM3 (ref 0.1–0.9)
MONOCYTES # BLD AUTO: 0.6 10*3/MM3 (ref 0.1–0.9)
MONOCYTES # BLD AUTO: 0.7 10*3/MM3 (ref 0.1–0.9)
MONOCYTES NFR BLD AUTO: 5.1 % (ref 5–12)
MONOCYTES NFR BLD AUTO: 5.7 % (ref 5–12)
MONOCYTES NFR BLD AUTO: 6.9 % (ref 5–12)
MONOCYTES NFR BLD AUTO: 7.1 % (ref 5–12)
MONOCYTES NFR BLD AUTO: 7.2 % (ref 5–12)
NEUTROPHILS # BLD AUTO: 8.47 10*3/MM3 (ref 1.7–7)
NEUTROPHILS NFR BLD AUTO: 5.8 10*3/MM3 (ref 1.7–7)
NEUTROPHILS NFR BLD AUTO: 6.8 10*3/MM3 (ref 1.7–7)
NEUTROPHILS NFR BLD AUTO: 7.8 10*3/MM3 (ref 1.7–7)
NEUTROPHILS NFR BLD AUTO: 72.4 % (ref 42.7–76)
NEUTROPHILS NFR BLD AUTO: 75.5 % (ref 42.7–76)
NEUTROPHILS NFR BLD AUTO: 77.2 % (ref 42.7–76)
NEUTROPHILS NFR BLD AUTO: 79.3 % (ref 42.7–76)
NEUTROPHILS NFR BLD AUTO: 79.5 % (ref 42.7–76)
NEUTROPHILS NFR BLD AUTO: 8.1 10*3/MM3 (ref 1.7–7)
NEUTROPHILS NFR BLD AUTO: 8.2 10*3/MM3 (ref 1.7–7)
NEUTROPHILS NFR BLD MANUAL: 76 % (ref 42.7–76)
NEUTS BAND NFR BLD MANUAL: 1 % (ref 0–5)
NITRITE UR QL STRIP: NEGATIVE
NRBC BLD AUTO-RTO: 0 /100 WBC (ref 0–0.2)
NRBC BLD AUTO-RTO: 0 /100 WBC (ref 0–0.2)
NRBC BLD AUTO-RTO: 0.1 /100 WBC (ref 0–0.2)
NRBC BLD AUTO-RTO: 0.1 /100 WBC (ref 0–0.2)
NRBC BLD AUTO-RTO: 0.2 /100 WBC (ref 0–0.2)
NT-PROBNP SERPL-MCNC: 1441 PG/ML (ref 0–1800)
PCO2 BLDA: 36.8 MM HG (ref 35–48)
PH BLDA: 7.4 PH UNITS (ref 7.35–7.45)
PH UR STRIP.AUTO: 6 [PH] (ref 5–8)
PH UR STRIP.AUTO: 6 [PH] (ref 5–8)
PH UR STRIP.AUTO: <=5 [PH] (ref 5–8)
PHOSPHATE SERPL-MCNC: 2.2 MG/DL (ref 2.5–4.5)
PHOSPHATE SERPL-MCNC: 2.8 MG/DL (ref 2.5–4.5)
PHOSPHATE SERPL-MCNC: 3.3 MG/DL (ref 2.5–4.5)
PLATELET # BLD AUTO: 221 10*3/MM3 (ref 140–450)
PLATELET # BLD AUTO: 227 10*3/MM3 (ref 140–450)
PLATELET # BLD AUTO: 236 10*3/MM3 (ref 140–450)
PLATELET # BLD AUTO: 251 10*3/MM3 (ref 140–450)
PLATELET # BLD AUTO: 252 10*3/MM3 (ref 140–450)
PLATELET # BLD AUTO: 259 10*3/MM3 (ref 140–450)
PLATELET # BLD AUTO: 298 10*3/MM3 (ref 140–450)
PMV BLD AUTO: 8.2 FL (ref 6–12)
PMV BLD AUTO: 8.4 FL (ref 6–12)
PMV BLD AUTO: 8.6 FL (ref 6–12)
PMV BLD AUTO: 8.6 FL (ref 6–12)
PMV BLD AUTO: 8.7 FL (ref 6–12)
PMV BLD AUTO: 9.2 FL (ref 6–12)
PMV BLD AUTO: 9.5 FL (ref 6–12)
PO2 BLDA: 66.4 MM HG (ref 83–108)
POTASSIUM SERPL-SCNC: 2.9 MMOL/L (ref 3.5–5.2)
POTASSIUM SERPL-SCNC: 3 MMOL/L (ref 3.5–5.2)
POTASSIUM SERPL-SCNC: 3.4 MMOL/L (ref 3.5–5.2)
POTASSIUM SERPL-SCNC: 4 MMOL/L (ref 3.5–5.2)
POTASSIUM SERPL-SCNC: 4.9 MMOL/L (ref 3.5–5.2)
PROT SERPL-MCNC: 6.1 G/DL (ref 6–8.5)
PROT SERPL-MCNC: 7 G/DL (ref 6–8.5)
PROT SERPL-MCNC: 7.4 G/DL (ref 6–8.5)
PROT SERPL-MCNC: 7.5 G/DL (ref 6–8.5)
PROT UR QL STRIP: ABNORMAL
PROT UR QL STRIP: NEGATIVE
PROT UR QL STRIP: NEGATIVE
PROTHROMBIN TIME: 11.3 SECONDS (ref 9.6–11.7)
PROTHROMBIN TIME: 11.6 SECONDS (ref 9.6–11.7)
PROTHROMBIN TIME: 11.9 SECONDS (ref 19.4–28.5)
RBC # BLD AUTO: 4.24 10*6/MM3 (ref 4.14–5.8)
RBC # BLD AUTO: 4.46 10*6/MM3 (ref 4.14–5.8)
RBC # BLD AUTO: 4.64 10*6/MM3 (ref 4.14–5.8)
RBC # BLD AUTO: 4.71 10*6/MM3 (ref 4.14–5.8)
RBC # BLD AUTO: 4.82 10*6/MM3 (ref 4.14–5.8)
RBC # BLD AUTO: 4.86 10*6/MM3 (ref 4.14–5.8)
RBC # BLD AUTO: 4.92 10*6/MM3 (ref 4.14–5.8)
RBC # UR: ABNORMAL /HPF
RBC # UR: ABNORMAL /HPF
REF LAB TEST METHOD: ABNORMAL
REF LAB TEST METHOD: ABNORMAL
RH BLD: POSITIVE
SAO2 % BLDCOA: 93 % (ref 94–98)
SODIUM SERPL-SCNC: 132 MMOL/L (ref 136–145)
SODIUM SERPL-SCNC: 134 MMOL/L (ref 136–145)
SODIUM SERPL-SCNC: 136 MMOL/L (ref 136–145)
SODIUM SERPL-SCNC: 137 MMOL/L (ref 136–145)
SODIUM SERPL-SCNC: 138 MMOL/L (ref 136–145)
SODIUM SERPL-SCNC: 139 MMOL/L (ref 136–145)
SODIUM SERPL-SCNC: 139 MMOL/L (ref 136–145)
SODIUM UR-SCNC: 21 MMOL/L
SP GR UR STRIP: 1.01 (ref 1–1.03)
SQUAMOUS #/AREA URNS HPF: ABNORMAL /HPF
SQUAMOUS #/AREA URNS HPF: ABNORMAL /HPF
T&S EXPIRATION DATE: NORMAL
TROPONIN T SERPL-MCNC: 0.03 NG/ML (ref 0–0.03)
TSH SERPL DL<=0.05 MIU/L-ACNC: 1.14 UIU/ML (ref 0.27–4.2)
UROBILINOGEN UR QL STRIP: ABNORMAL
WBC # BLD AUTO: 10.3 10*3/MM3 (ref 3.4–10.8)
WBC # BLD AUTO: 10.3 10*3/MM3 (ref 3.4–10.8)
WBC # BLD AUTO: 10.7 10*3/MM3 (ref 3.4–10.8)
WBC # BLD AUTO: 11 10*3/MM3 (ref 3.4–10.8)
WBC # BLD AUTO: 7.6 10*3/MM3 (ref 3.4–10.8)
WBC # BLD AUTO: 8.5 10*3/MM3 (ref 3.4–10.8)
WBC # BLD AUTO: 8.9 10*3/MM3 (ref 3.4–10.8)
WBC MORPH BLD: NORMAL
WBC UR QL AUTO: ABNORMAL /HPF
WBC UR QL AUTO: ABNORMAL /HPF
WHOLE BLOOD HOLD SPECIMEN: NORMAL
WHOLE BLOOD HOLD SPECIMEN: NORMAL

## 2020-01-01 PROCEDURE — 76775 US EXAM ABDO BACK WALL LIM: CPT

## 2020-01-01 PROCEDURE — G0378 HOSPITAL OBSERVATION PER HR: HCPCS

## 2020-01-01 PROCEDURE — 63710000001 INSULIN REGULAR HUMAN PER 5 UNITS: Performed by: EMERGENCY MEDICINE

## 2020-01-01 PROCEDURE — 86850 RBC ANTIBODY SCREEN: CPT | Performed by: EMERGENCY MEDICINE

## 2020-01-01 PROCEDURE — 84443 ASSAY THYROID STIM HORMONE: CPT | Performed by: FAMILY MEDICINE

## 2020-01-01 PROCEDURE — 86901 BLOOD TYPING SEROLOGIC RH(D): CPT

## 2020-01-01 PROCEDURE — 63710000001 INSULIN LISPRO (HUMAN) PER 5 UNITS: Performed by: INTERNAL MEDICINE

## 2020-01-01 PROCEDURE — 96361 HYDRATE IV INFUSION ADD-ON: CPT

## 2020-01-01 PROCEDURE — 99222 1ST HOSP IP/OBS MODERATE 55: CPT | Performed by: NURSE PRACTITIONER

## 2020-01-01 PROCEDURE — 99231 SBSQ HOSP IP/OBS SF/LOW 25: CPT | Performed by: NURSE PRACTITIONER

## 2020-01-01 PROCEDURE — 85730 THROMBOPLASTIN TIME PARTIAL: CPT | Performed by: EMERGENCY MEDICINE

## 2020-01-01 PROCEDURE — 70450 CT HEAD/BRAIN W/O DYE: CPT

## 2020-01-01 PROCEDURE — 83735 ASSAY OF MAGNESIUM: CPT | Performed by: FAMILY MEDICINE

## 2020-01-01 PROCEDURE — 63710000001 INSULIN LISPRO (HUMAN) PER 5 UNITS: Performed by: FAMILY MEDICINE

## 2020-01-01 PROCEDURE — 82962 GLUCOSE BLOOD TEST: CPT

## 2020-01-01 PROCEDURE — 96372 THER/PROPH/DIAG INJ SC/IM: CPT

## 2020-01-01 PROCEDURE — 85025 COMPLETE CBC W/AUTO DIFF WBC: CPT | Performed by: FAMILY MEDICINE

## 2020-01-01 PROCEDURE — 86901 BLOOD TYPING SEROLOGIC RH(D): CPT | Performed by: EMERGENCY MEDICINE

## 2020-01-01 PROCEDURE — 99284 EMERGENCY DEPT VISIT MOD MDM: CPT

## 2020-01-01 PROCEDURE — 96360 HYDRATION IV INFUSION INIT: CPT

## 2020-01-01 PROCEDURE — 84100 ASSAY OF PHOSPHORUS: CPT | Performed by: NURSE PRACTITIONER

## 2020-01-01 PROCEDURE — 83735 ASSAY OF MAGNESIUM: CPT | Performed by: NURSE PRACTITIONER

## 2020-01-01 PROCEDURE — 85018 HEMOGLOBIN: CPT | Performed by: NURSE PRACTITIONER

## 2020-01-01 PROCEDURE — 80069 RENAL FUNCTION PANEL: CPT | Performed by: INTERNAL MEDICINE

## 2020-01-01 PROCEDURE — 85025 COMPLETE CBC W/AUTO DIFF WBC: CPT | Performed by: INTERNAL MEDICINE

## 2020-01-01 PROCEDURE — 80053 COMPREHEN METABOLIC PANEL: CPT | Performed by: EMERGENCY MEDICINE

## 2020-01-01 PROCEDURE — 36600 WITHDRAWAL OF ARTERIAL BLOOD: CPT

## 2020-01-01 PROCEDURE — 93005 ELECTROCARDIOGRAM TRACING: CPT | Performed by: EMERGENCY MEDICINE

## 2020-01-01 PROCEDURE — 84484 ASSAY OF TROPONIN QUANT: CPT | Performed by: EMERGENCY MEDICINE

## 2020-01-01 PROCEDURE — 82009 KETONE BODYS QUAL: CPT | Performed by: NURSE PRACTITIONER

## 2020-01-01 PROCEDURE — 85610 PROTHROMBIN TIME: CPT | Performed by: NURSE PRACTITIONER

## 2020-01-01 PROCEDURE — 85025 COMPLETE CBC W/AUTO DIFF WBC: CPT | Performed by: EMERGENCY MEDICINE

## 2020-01-01 PROCEDURE — 74176 CT ABD & PELVIS W/O CONTRAST: CPT

## 2020-01-01 PROCEDURE — 84300 ASSAY OF URINE SODIUM: CPT | Performed by: INTERNAL MEDICINE

## 2020-01-01 PROCEDURE — 85025 COMPLETE CBC W/AUTO DIFF WBC: CPT | Performed by: NURSE PRACTITIONER

## 2020-01-01 PROCEDURE — 25010000002 HEPARIN (PORCINE) PER 1000 UNITS: Performed by: FAMILY MEDICINE

## 2020-01-01 PROCEDURE — 80048 BASIC METABOLIC PNL TOTAL CA: CPT | Performed by: INTERNAL MEDICINE

## 2020-01-01 PROCEDURE — 63710000001 INSULIN GLARGINE PER 5 UNITS: Performed by: INTERNAL MEDICINE

## 2020-01-01 PROCEDURE — 83036 HEMOGLOBIN GLYCOSYLATED A1C: CPT | Performed by: FAMILY MEDICINE

## 2020-01-01 PROCEDURE — 85027 COMPLETE CBC AUTOMATED: CPT | Performed by: FAMILY MEDICINE

## 2020-01-01 PROCEDURE — 25010000002 CEFTRIAXONE IN SWFI 1 GRAM/10ML IV PUSH SYRINGE (SIMPLE): Performed by: EMERGENCY MEDICINE

## 2020-01-01 PROCEDURE — 99214 OFFICE O/P EST MOD 30 MIN: CPT | Performed by: INTERNAL MEDICINE

## 2020-01-01 PROCEDURE — 82803 BLOOD GASES ANY COMBINATION: CPT

## 2020-01-01 PROCEDURE — 86900 BLOOD TYPING SEROLOGIC ABO: CPT | Performed by: EMERGENCY MEDICINE

## 2020-01-01 PROCEDURE — 99221 1ST HOSP IP/OBS SF/LOW 40: CPT | Performed by: INTERNAL MEDICINE

## 2020-01-01 PROCEDURE — 99283 EMERGENCY DEPT VISIT LOW MDM: CPT

## 2020-01-01 PROCEDURE — 85014 HEMATOCRIT: CPT | Performed by: NURSE PRACTITIONER

## 2020-01-01 PROCEDURE — 84100 ASSAY OF PHOSPHORUS: CPT | Performed by: FAMILY MEDICINE

## 2020-01-01 PROCEDURE — 80048 BASIC METABOLIC PNL TOTAL CA: CPT | Performed by: FAMILY MEDICINE

## 2020-01-01 PROCEDURE — 99285 EMERGENCY DEPT VISIT HI MDM: CPT

## 2020-01-01 PROCEDURE — 85610 PROTHROMBIN TIME: CPT | Performed by: EMERGENCY MEDICINE

## 2020-01-01 PROCEDURE — 81001 URINALYSIS AUTO W/SCOPE: CPT | Performed by: EMERGENCY MEDICINE

## 2020-01-01 PROCEDURE — 86900 BLOOD TYPING SEROLOGIC ABO: CPT

## 2020-01-01 PROCEDURE — 83880 ASSAY OF NATRIURETIC PEPTIDE: CPT | Performed by: FAMILY MEDICINE

## 2020-01-01 PROCEDURE — 63710000001 INSULIN REGULAR HUMAN PER 5 UNITS: Performed by: NURSE PRACTITIONER

## 2020-01-01 PROCEDURE — 85007 BL SMEAR W/DIFF WBC COUNT: CPT | Performed by: EMERGENCY MEDICINE

## 2020-01-01 PROCEDURE — 63710000001 INSULIN GLARGINE PER 5 UNITS: Performed by: FAMILY MEDICINE

## 2020-01-01 PROCEDURE — 81003 URINALYSIS AUTO W/O SCOPE: CPT | Performed by: NURSE PRACTITIONER

## 2020-01-01 PROCEDURE — 81001 URINALYSIS AUTO W/SCOPE: CPT | Performed by: INTERNAL MEDICINE

## 2020-01-01 PROCEDURE — 80053 COMPREHEN METABOLIC PANEL: CPT | Performed by: NURSE PRACTITIONER

## 2020-01-01 PROCEDURE — 80053 COMPREHEN METABOLIC PANEL: CPT | Performed by: FAMILY MEDICINE

## 2020-01-01 RX ORDER — SODIUM CHLORIDE 0.9 % (FLUSH) 0.9 %
10 SYRINGE (ML) INJECTION AS NEEDED
Status: DISCONTINUED | OUTPATIENT
Start: 2020-01-01 | End: 2020-01-01 | Stop reason: HOSPADM

## 2020-01-01 RX ORDER — INSULIN GLARGINE 100 [IU]/ML
50 INJECTION, SOLUTION SUBCUTANEOUS NIGHTLY
Qty: 3 ML | Refills: 12 | Status: SHIPPED | OUTPATIENT
Start: 2020-01-01 | End: 2020-01-01

## 2020-01-01 RX ORDER — INSULIN DEGLUDEC INJECTION 100 U/ML
50 INJECTION, SOLUTION SUBCUTANEOUS NIGHTLY
Qty: 10 PEN | Refills: 6 | Status: SHIPPED | OUTPATIENT
Start: 2020-01-01 | End: 2021-01-01

## 2020-01-01 RX ORDER — POTASSIUM CHLORIDE 20 MEQ/1
40 TABLET, EXTENDED RELEASE ORAL AS NEEDED
Status: DISCONTINUED | OUTPATIENT
Start: 2020-01-01 | End: 2020-01-01 | Stop reason: HOSPADM

## 2020-01-01 RX ORDER — ATORVASTATIN CALCIUM 10 MG/1
10 TABLET, FILM COATED ORAL DAILY
Status: DISCONTINUED | OUTPATIENT
Start: 2020-01-01 | End: 2020-01-01 | Stop reason: HOSPADM

## 2020-01-01 RX ORDER — ESCITALOPRAM OXALATE 10 MG/1
10 TABLET ORAL DAILY
Status: DISCONTINUED | OUTPATIENT
Start: 2020-01-01 | End: 2020-01-01 | Stop reason: HOSPADM

## 2020-01-01 RX ORDER — POTASSIUM CHLORIDE 1.5 G/1.77G
40 POWDER, FOR SOLUTION ORAL AS NEEDED
Status: DISCONTINUED | OUTPATIENT
Start: 2020-01-01 | End: 2020-01-01 | Stop reason: HOSPADM

## 2020-01-01 RX ORDER — INSULIN GLARGINE 100 [IU]/ML
20 INJECTION, SOLUTION SUBCUTANEOUS ONCE
Status: COMPLETED | OUTPATIENT
Start: 2020-01-01 | End: 2020-01-01

## 2020-01-01 RX ORDER — VALACYCLOVIR HYDROCHLORIDE 1 G/1
1000 TABLET, FILM COATED ORAL 3 TIMES DAILY
Qty: 21 TABLET | Refills: 0 | Status: SHIPPED | OUTPATIENT
Start: 2020-01-01 | End: 2020-01-01

## 2020-01-01 RX ORDER — DEXTROSE MONOHYDRATE 25 G/50ML
25 INJECTION, SOLUTION INTRAVENOUS
Status: DISCONTINUED | OUTPATIENT
Start: 2020-01-01 | End: 2020-01-01 | Stop reason: HOSPADM

## 2020-01-01 RX ORDER — ASPIRIN 81 MG/1
81 TABLET ORAL DAILY
Status: DISCONTINUED | OUTPATIENT
Start: 2020-01-01 | End: 2020-01-01 | Stop reason: HOSPADM

## 2020-01-01 RX ORDER — HYDROCODONE BITARTRATE AND ACETAMINOPHEN 5; 325 MG/1; MG/1
1 TABLET ORAL EVERY 4 HOURS PRN
Status: DISCONTINUED | OUTPATIENT
Start: 2020-01-01 | End: 2020-01-01 | Stop reason: HOSPADM

## 2020-01-01 RX ORDER — BISACODYL 10 MG
10 SUPPOSITORY, RECTAL RECTAL DAILY PRN
Status: DISCONTINUED | OUTPATIENT
Start: 2020-01-01 | End: 2020-01-01 | Stop reason: HOSPADM

## 2020-01-01 RX ORDER — POTASSIUM CHLORIDE 7.45 MG/ML
10 INJECTION INTRAVENOUS
Status: DISCONTINUED | OUTPATIENT
Start: 2020-01-01 | End: 2020-01-01 | Stop reason: HOSPADM

## 2020-01-01 RX ORDER — INSULIN GLARGINE 100 [IU]/ML
30 INJECTION, SOLUTION SUBCUTANEOUS NIGHTLY
Status: DISCONTINUED | OUTPATIENT
Start: 2020-01-01 | End: 2020-01-01 | Stop reason: HOSPADM

## 2020-01-01 RX ORDER — ONDANSETRON 2 MG/ML
4 INJECTION INTRAMUSCULAR; INTRAVENOUS EVERY 6 HOURS PRN
Status: DISCONTINUED | OUTPATIENT
Start: 2020-01-01 | End: 2020-01-01 | Stop reason: HOSPADM

## 2020-01-01 RX ORDER — INSULIN LISPRO 100 [IU]/ML
10 INJECTION, SOLUTION INTRAVENOUS; SUBCUTANEOUS
Status: DISCONTINUED | OUTPATIENT
Start: 2020-01-01 | End: 2020-01-01 | Stop reason: HOSPADM

## 2020-01-01 RX ORDER — HEPARIN SODIUM 5000 [USP'U]/ML
5000 INJECTION, SOLUTION INTRAVENOUS; SUBCUTANEOUS EVERY 12 HOURS SCHEDULED
Status: DISCONTINUED | OUTPATIENT
Start: 2020-01-01 | End: 2020-01-01 | Stop reason: HOSPADM

## 2020-01-01 RX ORDER — BISOPROLOL FUMARATE 5 MG/1
5 TABLET, FILM COATED ORAL DAILY
Status: DISCONTINUED | OUTPATIENT
Start: 2020-01-01 | End: 2020-01-01 | Stop reason: HOSPADM

## 2020-01-01 RX ORDER — SODIUM CHLORIDE 9 MG/ML
20 INJECTION, SOLUTION INTRAVENOUS CONTINUOUS
Status: DISCONTINUED | OUTPATIENT
Start: 2020-01-01 | End: 2020-01-01 | Stop reason: HOSPADM

## 2020-01-01 RX ORDER — NICOTINE POLACRILEX 4 MG
15 LOZENGE BUCCAL
Status: DISCONTINUED | OUTPATIENT
Start: 2020-01-01 | End: 2020-01-01 | Stop reason: HOSPADM

## 2020-01-01 RX ORDER — INSULIN GLARGINE 100 [IU]/ML
63 INJECTION, SOLUTION SUBCUTANEOUS NIGHTLY
Status: DISCONTINUED | OUTPATIENT
Start: 2020-01-01 | End: 2020-01-01

## 2020-01-01 RX ORDER — POTASSIUM CHLORIDE 750 MG/1
10 TABLET, FILM COATED, EXTENDED RELEASE ORAL ONCE
Status: COMPLETED | OUTPATIENT
Start: 2020-01-01 | End: 2020-01-01

## 2020-01-01 RX ORDER — FINASTERIDE 5 MG/1
5 TABLET, FILM COATED ORAL DAILY
Status: DISCONTINUED | OUTPATIENT
Start: 2020-01-01 | End: 2020-01-01 | Stop reason: HOSPADM

## 2020-01-01 RX ORDER — SODIUM CHLORIDE 0.9 % (FLUSH) 0.9 %
10 SYRINGE (ML) INJECTION EVERY 12 HOURS SCHEDULED
Status: DISCONTINUED | OUTPATIENT
Start: 2020-01-01 | End: 2020-01-01 | Stop reason: HOSPADM

## 2020-01-01 RX ORDER — INSULIN GLARGINE 100 [IU]/ML
50 INJECTION, SOLUTION SUBCUTANEOUS NIGHTLY
Status: DISCONTINUED | OUTPATIENT
Start: 2020-01-01 | End: 2020-01-01 | Stop reason: HOSPADM

## 2020-01-01 RX ORDER — LEVETIRACETAM 500 MG/1
500 TABLET, EXTENDED RELEASE ORAL DAILY
Status: DISCONTINUED | OUTPATIENT
Start: 2020-01-01 | End: 2020-01-01 | Stop reason: HOSPADM

## 2020-01-01 RX ORDER — PREDNISONE 20 MG/1
40 TABLET ORAL DAILY
Qty: 14 TABLET | Refills: 0 | Status: SHIPPED | OUTPATIENT
Start: 2020-01-01 | End: 2020-01-01

## 2020-01-01 RX ORDER — MAGNESIUM CARB/ALUMINUM HYDROX 105-160MG
296 TABLET,CHEWABLE ORAL ONCE
Status: COMPLETED | OUTPATIENT
Start: 2020-01-01 | End: 2020-01-01

## 2020-01-01 RX ORDER — ACETAMINOPHEN 650 MG/1
650 SUPPOSITORY RECTAL EVERY 4 HOURS PRN
Status: DISCONTINUED | OUTPATIENT
Start: 2020-01-01 | End: 2020-01-01 | Stop reason: HOSPADM

## 2020-01-01 RX ORDER — ONDANSETRON 4 MG/1
4 TABLET, FILM COATED ORAL EVERY 6 HOURS PRN
Status: DISCONTINUED | OUTPATIENT
Start: 2020-01-01 | End: 2020-01-01 | Stop reason: HOSPADM

## 2020-01-01 RX ORDER — POTASSIUM CHLORIDE 20 MEQ/1
40 TABLET, EXTENDED RELEASE ORAL ONCE
Status: COMPLETED | OUTPATIENT
Start: 2020-01-01 | End: 2020-01-01

## 2020-01-01 RX ORDER — LEVETIRACETAM 500 MG/1
1000 TABLET, EXTENDED RELEASE ORAL DAILY
Status: DISCONTINUED | OUTPATIENT
Start: 2020-01-01 | End: 2020-01-01 | Stop reason: HOSPADM

## 2020-01-01 RX ORDER — ACETAMINOPHEN 325 MG/1
650 TABLET ORAL EVERY 4 HOURS PRN
Status: DISCONTINUED | OUTPATIENT
Start: 2020-01-01 | End: 2020-01-01 | Stop reason: HOSPADM

## 2020-01-01 RX ORDER — SODIUM CHLORIDE 9 MG/ML
100 INJECTION, SOLUTION INTRAVENOUS CONTINUOUS
Status: DISCONTINUED | OUTPATIENT
Start: 2020-01-01 | End: 2020-01-01 | Stop reason: HOSPADM

## 2020-01-01 RX ORDER — CALCIUM CARBONATE 200(500)MG
2 TABLET,CHEWABLE ORAL 2 TIMES DAILY PRN
Status: DISCONTINUED | OUTPATIENT
Start: 2020-01-01 | End: 2020-01-01 | Stop reason: HOSPADM

## 2020-01-01 RX ORDER — ACETAMINOPHEN 160 MG/5ML
650 SOLUTION ORAL EVERY 4 HOURS PRN
Status: DISCONTINUED | OUTPATIENT
Start: 2020-01-01 | End: 2020-01-01 | Stop reason: HOSPADM

## 2020-01-01 RX ADMIN — SODIUM CHLORIDE 100 ML/HR: 900 INJECTION, SOLUTION INTRAVENOUS at 20:59

## 2020-01-01 RX ADMIN — BISOPROLOL FUMARATE 5 MG: 5 TABLET ORAL at 08:26

## 2020-01-01 RX ADMIN — ATORVASTATIN CALCIUM 10 MG: 10 TABLET, FILM COATED ORAL at 08:32

## 2020-01-01 RX ADMIN — FINASTERIDE 5 MG: 5 TABLET, FILM COATED ORAL at 08:26

## 2020-01-01 RX ADMIN — SODIUM CHLORIDE 500 ML: 900 INJECTION, SOLUTION INTRAVENOUS at 16:39

## 2020-01-01 RX ADMIN — INSULIN LISPRO 24 UNITS: 100 INJECTION, SOLUTION INTRAVENOUS; SUBCUTANEOUS at 20:56

## 2020-01-01 RX ADMIN — METRONIDAZOLE 500 MG: 500 INJECTION, SOLUTION INTRAVENOUS at 09:10

## 2020-01-01 RX ADMIN — INSULIN LISPRO 10 UNITS: 100 INJECTION, SOLUTION INTRAVENOUS; SUBCUTANEOUS at 13:36

## 2020-01-01 RX ADMIN — FINASTERIDE 5 MG: 5 TABLET, FILM COATED ORAL at 14:59

## 2020-01-01 RX ADMIN — INSULIN LISPRO 8 UNITS: 100 INJECTION, SOLUTION INTRAVENOUS; SUBCUTANEOUS at 17:26

## 2020-01-01 RX ADMIN — LEVETIRACETAM 500 MG: 500 TABLET, FILM COATED, EXTENDED RELEASE ORAL at 08:59

## 2020-01-01 RX ADMIN — BISOPROLOL FUMARATE 5 MG: 5 TABLET, FILM COATED ORAL at 08:59

## 2020-01-01 RX ADMIN — HEPARIN SODIUM 5000 UNITS: 5000 INJECTION INTRAVENOUS; SUBCUTANEOUS at 08:27

## 2020-01-01 RX ADMIN — Medication 10 ML: at 20:57

## 2020-01-01 RX ADMIN — INSULIN LISPRO 4 UNITS: 100 INJECTION, SOLUTION INTRAVENOUS; SUBCUTANEOUS at 03:06

## 2020-01-01 RX ADMIN — ATORVASTATIN CALCIUM 10 MG: 10 TABLET, FILM COATED ORAL at 08:26

## 2020-01-01 RX ADMIN — SODIUM CHLORIDE 100 ML/HR: 900 INJECTION, SOLUTION INTRAVENOUS at 21:52

## 2020-01-01 RX ADMIN — LEVETIRACETAM 1000 MG: 500 TABLET, FILM COATED, EXTENDED RELEASE ORAL at 08:29

## 2020-01-01 RX ADMIN — INSULIN HUMAN 10 UNITS: 100 INJECTION, SOLUTION PARENTERAL at 16:37

## 2020-01-01 RX ADMIN — FINASTERIDE 5 MG: 5 TABLET, FILM COATED ORAL at 08:28

## 2020-01-01 RX ADMIN — ATORVASTATIN CALCIUM 10 MG: 10 TABLET, FILM COATED ORAL at 08:59

## 2020-01-01 RX ADMIN — ASPIRIN 81 MG: 81 TABLET, COATED ORAL at 08:27

## 2020-01-01 RX ADMIN — ATORVASTATIN CALCIUM 10 MG: 10 TABLET, FILM COATED ORAL at 14:59

## 2020-01-01 RX ADMIN — FINASTERIDE 5 MG: 5 TABLET, FILM COATED ORAL at 08:59

## 2020-01-01 RX ADMIN — LEVETIRACETAM 500 MG: 500 TABLET, FILM COATED, EXTENDED RELEASE ORAL at 14:59

## 2020-01-01 RX ADMIN — Medication 10 ML: at 16:38

## 2020-01-01 RX ADMIN — Medication 10 ML: at 08:26

## 2020-01-01 RX ADMIN — SODIUM CHLORIDE 500 ML: 0.9 INJECTION, SOLUTION INTRAVENOUS at 08:01

## 2020-01-01 RX ADMIN — ESCITALOPRAM OXALATE 10 MG: 10 TABLET ORAL at 08:27

## 2020-01-01 RX ADMIN — Medication 10 ML: at 20:58

## 2020-01-01 RX ADMIN — POTASSIUM CHLORIDE 10 MEQ: 750 TABLET, EXTENDED RELEASE ORAL at 20:18

## 2020-01-01 RX ADMIN — Medication 10 ML: at 08:28

## 2020-01-01 RX ADMIN — ESCITALOPRAM OXALATE 10 MG: 10 TABLET ORAL at 08:28

## 2020-01-01 RX ADMIN — INSULIN GLARGINE 30 UNITS: 100 INJECTION, SOLUTION SUBCUTANEOUS at 21:37

## 2020-01-01 RX ADMIN — CEFTRIAXONE SODIUM 1 G: 1 INJECTION, POWDER, FOR SOLUTION INTRAMUSCULAR; INTRAVENOUS at 09:09

## 2020-01-01 RX ADMIN — POTASSIUM CHLORIDE 40 MEQ: 1500 TABLET, EXTENDED RELEASE ORAL at 20:58

## 2020-01-01 RX ADMIN — HEPARIN SODIUM 5000 UNITS: 5000 INJECTION INTRAVENOUS; SUBCUTANEOUS at 08:28

## 2020-01-01 RX ADMIN — INSULIN LISPRO 10 UNITS: 100 INJECTION, SOLUTION INTRAVENOUS; SUBCUTANEOUS at 17:22

## 2020-01-01 RX ADMIN — ESCITALOPRAM OXALATE 10 MG: 10 TABLET ORAL at 14:59

## 2020-01-01 RX ADMIN — INSULIN LISPRO 5 UNITS: 100 INJECTION, SOLUTION INTRAVENOUS; SUBCUTANEOUS at 17:26

## 2020-01-01 RX ADMIN — INSULIN GLARGINE 63 UNITS: 100 INJECTION, SOLUTION SUBCUTANEOUS at 20:56

## 2020-01-01 RX ADMIN — INSULIN LISPRO 5 UNITS: 100 INJECTION, SOLUTION INTRAVENOUS; SUBCUTANEOUS at 08:27

## 2020-01-01 RX ADMIN — Medication 296 ML: at 12:02

## 2020-01-01 RX ADMIN — HEPARIN SODIUM 5000 UNITS: 5000 INJECTION INTRAVENOUS; SUBCUTANEOUS at 20:57

## 2020-01-01 RX ADMIN — LEVETIRACETAM 1000 MG: 500 TABLET, FILM COATED, EXTENDED RELEASE ORAL at 08:32

## 2020-01-01 RX ADMIN — INSULIN LISPRO 4 UNITS: 100 INJECTION, SOLUTION INTRAVENOUS; SUBCUTANEOUS at 11:41

## 2020-01-01 RX ADMIN — BISOPROLOL FUMARATE 5 MG: 5 TABLET ORAL at 08:28

## 2020-01-01 RX ADMIN — SODIUM CHLORIDE 20 ML/HR: 9 INJECTION, SOLUTION INTRAVENOUS at 15:01

## 2020-01-01 RX ADMIN — POTASSIUM CHLORIDE 40 MEQ: 1500 TABLET, EXTENDED RELEASE ORAL at 08:28

## 2020-01-01 RX ADMIN — BISOPROLOL FUMARATE 5 MG: 5 TABLET, FILM COATED ORAL at 14:59

## 2020-01-01 RX ADMIN — ESCITALOPRAM OXALATE 10 MG: 10 TABLET ORAL at 08:59

## 2020-01-01 RX ADMIN — POTASSIUM CHLORIDE 40 MEQ: 1500 TABLET, EXTENDED RELEASE ORAL at 06:38

## 2020-01-01 RX ADMIN — ASPIRIN 81 MG: 81 TABLET, COATED ORAL at 08:28

## 2020-01-01 RX ADMIN — SODIUM CHLORIDE 1000 ML: 9 INJECTION, SOLUTION INTRAVENOUS at 18:20

## 2020-01-01 RX ADMIN — INSULIN LISPRO 10 UNITS: 100 INJECTION, SOLUTION INTRAVENOUS; SUBCUTANEOUS at 08:59

## 2020-01-01 RX ADMIN — INSULIN GLARGINE 20 UNITS: 100 INJECTION, SOLUTION SUBCUTANEOUS at 21:05

## 2020-01-01 RX ADMIN — INSULIN HUMAN 9 UNITS: 100 INJECTION, SOLUTION PARENTERAL at 18:20

## 2020-03-06 ENCOUNTER — TRANSCRIBE ORDERS (OUTPATIENT)
Dept: ADMINISTRATIVE | Facility: HOSPITAL | Age: 85
End: 2020-03-06

## 2020-03-06 DIAGNOSIS — I73.9 PVD (PERIPHERAL VASCULAR DISEASE) (HCC): Primary | ICD-10-CM

## 2020-03-19 ENCOUNTER — OFFICE VISIT (OUTPATIENT)
Dept: ENDOCRINOLOGY | Facility: CLINIC | Age: 85
End: 2020-03-19

## 2020-03-19 VITALS
WEIGHT: 193 LBS | SYSTOLIC BLOOD PRESSURE: 115 MMHG | DIASTOLIC BLOOD PRESSURE: 70 MMHG | TEMPERATURE: 97.3 F | HEART RATE: 71 BPM | HEIGHT: 70 IN | BODY MASS INDEX: 27.63 KG/M2 | OXYGEN SATURATION: 97 %

## 2020-03-19 DIAGNOSIS — I10 ESSENTIAL HYPERTENSION: ICD-10-CM

## 2020-03-19 DIAGNOSIS — E11.65 TYPE 2 DIABETES MELLITUS WITH HYPERGLYCEMIA, WITH LONG-TERM CURRENT USE OF INSULIN (HCC): Primary | ICD-10-CM

## 2020-03-19 DIAGNOSIS — Z79.4 TYPE 2 DIABETES MELLITUS WITH HYPERGLYCEMIA, WITH LONG-TERM CURRENT USE OF INSULIN (HCC): Primary | ICD-10-CM

## 2020-03-19 DIAGNOSIS — E78.2 MIXED HYPERLIPIDEMIA: ICD-10-CM

## 2020-03-19 PROBLEM — C18.9 COLON CANCER (HCC): Status: ACTIVE | Noted: 2018-09-19

## 2020-03-19 PROBLEM — E78.5 HYPERLIPIDEMIA: Status: ACTIVE | Noted: 2017-06-12

## 2020-03-19 PROBLEM — K21.9 GASTROESOPHAGEAL REFLUX DISEASE: Status: ACTIVE | Noted: 2018-09-19

## 2020-03-19 PROBLEM — G40.909 SEIZURE DISORDER (HCC): Status: ACTIVE | Noted: 2018-09-19

## 2020-03-19 PROBLEM — I48.91 ATRIAL FIBRILLATION (HCC): Status: ACTIVE | Noted: 2017-06-12

## 2020-03-19 PROBLEM — I63.9 CEREBROVASCULAR ACCIDENT (CVA) (HCC): Status: ACTIVE | Noted: 2018-09-19

## 2020-03-19 PROBLEM — I67.82 TEMPORARY CEREBRAL VASCULAR DYSFUNCTION: Status: ACTIVE | Noted: 2017-06-12

## 2020-03-19 PROCEDURE — 99214 OFFICE O/P EST MOD 30 MIN: CPT | Performed by: INTERNAL MEDICINE

## 2020-03-19 RX ORDER — INSULIN ASPART 100 [IU]/ML
10 INJECTION, SOLUTION INTRAVENOUS; SUBCUTANEOUS
COMMUNITY
Start: 2020-03-06 | End: 2020-03-19 | Stop reason: SDUPTHER

## 2020-03-19 RX ORDER — CEPHALEXIN 500 MG/1
CAPSULE ORAL
COMMUNITY
Start: 2020-03-06 | End: 2020-01-01

## 2020-03-19 RX ORDER — INSULIN DEGLUDEC INJECTION 100 U/ML
70 INJECTION, SOLUTION SUBCUTANEOUS DAILY
COMMUNITY
Start: 2020-03-06 | End: 2020-03-19 | Stop reason: SDUPTHER

## 2020-03-19 RX ORDER — PRAVASTATIN SODIUM 40 MG
40 TABLET ORAL DAILY
COMMUNITY
Start: 2020-03-18

## 2020-03-19 RX ORDER — INSULIN ASPART 100 [IU]/ML
INJECTION, SOLUTION INTRAVENOUS; SUBCUTANEOUS
Qty: 10 PEN | Refills: 6 | Status: SHIPPED | OUTPATIENT
Start: 2020-03-19 | End: 2020-01-01 | Stop reason: HOSPADM

## 2020-03-19 RX ORDER — FINASTERIDE 5 MG/1
5 TABLET, FILM COATED ORAL DAILY
COMMUNITY
Start: 2020-03-18

## 2020-03-19 RX ORDER — POTASSIUM CHLORIDE 1.5 G/1.77G
POWDER, FOR SOLUTION ORAL
COMMUNITY
Start: 2019-02-01 | End: 2020-01-01 | Stop reason: SDUPTHER

## 2020-03-19 RX ORDER — METOLAZONE 2.5 MG/1
2.5 TABLET ORAL DAILY
COMMUNITY
Start: 2020-03-18 | End: 2020-01-01 | Stop reason: HOSPADM

## 2020-03-19 RX ORDER — SILVER SULFADIAZINE 1 %
CREAM (GRAM) TOPICAL
COMMUNITY
Start: 2020-03-06 | End: 2020-01-01

## 2020-03-19 RX ORDER — POTASSIUM CHLORIDE 20 MEQ/1
40 TABLET, EXTENDED RELEASE ORAL DAILY
COMMUNITY
Start: 2020-03-18

## 2020-03-19 RX ORDER — FERROUS SULFATE 325(65) MG
TABLET ORAL EVERY 24 HOURS
COMMUNITY
Start: 2019-02-01 | End: 2020-01-01 | Stop reason: RX

## 2020-03-19 RX ORDER — ASPIRIN 81 MG/1
81 TABLET ORAL DAILY
COMMUNITY
Start: 2017-06-12

## 2020-03-19 RX ORDER — NAPROXEN SODIUM 220 MG
TABLET ORAL
COMMUNITY
Start: 2020-01-09 | End: 2020-01-01

## 2020-03-19 RX ORDER — BISOPROLOL FUMARATE 5 MG/1
5 TABLET, FILM COATED ORAL DAILY
COMMUNITY
Start: 2020-02-17

## 2020-03-19 RX ORDER — LEVETIRACETAM 500 MG/1
2000 TABLET, EXTENDED RELEASE ORAL DAILY
COMMUNITY
Start: 2020-03-18

## 2020-03-19 RX ORDER — FUROSEMIDE 20 MG/1
40 TABLET ORAL DAILY
COMMUNITY
Start: 2020-03-18

## 2020-03-19 RX ORDER — INSULIN DEGLUDEC INJECTION 100 U/ML
60 INJECTION, SOLUTION SUBCUTANEOUS DAILY
Qty: 10 PEN | Refills: 6 | Status: SHIPPED | OUTPATIENT
Start: 2020-03-19 | End: 2020-01-01 | Stop reason: HOSPADM

## 2020-03-19 RX ORDER — ESCITALOPRAM OXALATE 10 MG/1
10 TABLET ORAL DAILY
COMMUNITY
Start: 2020-03-18

## 2020-03-19 RX ORDER — LEVETIRACETAM 500 MG/1
TABLET ORAL EVERY 12 HOURS
COMMUNITY
Start: 2018-09-19 | End: 2020-01-01 | Stop reason: SDUPTHER

## 2020-03-19 NOTE — PATIENT INSTRUCTIONS
Please decrease Tresiba to 60 units subcu daily  Please increase NovoLog to 15 units with each meal  Always keep glucose source with you in case of low blood sugar  Target blood sugar is 100-180 and if urine blood sugar less than 100 then please call me.  Always get regular eye exam and flu vaccine.

## 2020-03-19 NOTE — PROGRESS NOTES
Endocrine Progress Note Outpatient     Patient Care Team:  John Burleson MD as PCP - General  John Burleson MD as PCP - Family Medicine    Chief Complaint: Follow-up uncontrolled type 2 diabetes    HPI: 89-year-old male with history of type 2 diabetes, hypertension, hyperlipidemia, A. fib, TIA is here for follow-up.  Last seen in May 2018.  For type 2 diabetes he is currently taking Tresiba 70 units once a day along with Humalog about 10 units with each meal.  On his last visit in May 2018 he was on Humalog 25 units before each meal however he tells me that he did have some low blood sugars and the dose has been reduced.  He did bring in blood sugar records for review, his morning sugars are running less than 200 however his evening numbers are higher than 200.  He had 2 seizure episodes 1 about a year ago and the other one was in February 2020 and he is on Keppra now.  He tells me that he does not think it was due to low blood sugars.  Hypertension: Well-controlled  Hyperlipidemia: On pravastatin.    Past Medical History:   Diagnosis Date   • A-fib (CMS/[facility])    • Hypertension    • Type 2 diabetes mellitus (CMS/[facility])        Social History     Socioeconomic History   • Marital status:      Spouse name: Not on file   • Number of children: Not on file   • Years of education: Not on file   • Highest education level: Not on file   Substance and Sexual Activity   • Alcohol use: Yes     Comment: occasinolly       Family History   Problem Relation Age of Onset   • Cancer Father         multipule mylomia        No Known Allergies    ROS:   Constitutional:  Denies fatigue, tiredness.    Eyes:  Denies change in visual acuity   HENT:  Denies nasal congestion or sore throat   Respiratory: denies cough, shortness of breath.   Cardiovascular:  denies chest pain, edema   GI:  Denies abdominal pain, nausea, vomiting.   Musculoskeletal:  Denies back pain or joint pain   Integument:  Denies dry skin and rash   Neurologic:   "Denies headache, focal weakness or sensory changes   Endocrine:  Denies polyuria or polydipsia   Psychiatric:  Denies depression or anxiety      Vitals:    03/19/20 0921   BP: 115/70   Pulse: 71   Temp: 97.3 °F (36.3 °C)   SpO2: 97%       Physical Exam:  GEN: NAD, conversant  EYES: EOMI, PERRL, no conjunctival erythema  NECK: no thyromegaly, full ROM   CV: RRR, no murmurs/rubs/gallops, no peripheral edema  LUNG: CTAB, no wheezes/rales/ronchi  SKIN: no rashes, no acanthosis  MSK: no deformities, full ROM of all extremities  NEURO: no tremors, DTR normal  PSYCH: AOX3, appropriate mood, affect normal      Results Review:     I reviewed the patient's new clinical results.      Lab Results   Component Value Date    GLUCOSE 85 04/28/2019    BUN 23 (H) 04/28/2019    CREATININE 1.4 (H) 04/28/2019    BCR 16.4 04/28/2019    K 3.6 04/28/2019    CO2 25 04/28/2019    CALCIUM 8.4 (L) 04/28/2019    ALBUMIN 3.0 (L) 04/28/2019    LABIL2 0.8 (L) 04/28/2019    AST 14 (L) 04/28/2019    ALT 10 (L) 04/28/2019    CHOL 111 07/15/2018    TRIG 59 07/15/2018    LDL 59 07/15/2018    HDL 32 (L) 07/15/2018     Lab Results   Component Value Date    TSH 0.95 12/11/2018     Labs from March 6, 2020 showed a TSH of 1.2, A1c 8.9, LDL cholesterol 69, triglycerides 85, AST 17, ALT 11, creatinine 1.7, sodium 143, potassium 4.2, chloride 101, CO2 27, glucose 123, BUN 40.    Medication Review: Reviewed.       Current Outpatient Medications:   •  aspirin (Aspir-Low) 81 MG EC tablet, ASPIR-LOW 81 MG TBEC, Disp: , Rfl:   •  bisoprolol (ZEBeta) 5 MG tablet, TAKE ONE (1) TABLET BY MOUTH EVERY DAY, Disp: , Rfl:   •  cephalexin (KEFLEX) 500 MG capsule, , Disp: , Rfl:   •  escitalopram (LEXAPRO) 10 MG tablet, , Disp: , Rfl:   •  ferrous sulfate 325 (65 FE) MG tablet, Daily., Disp: , Rfl:   •  finasteride (PROSCAR) 5 MG tablet, , Disp: , Rfl:   •  furosemide (LASIX) 20 MG tablet, , Disp: , Rfl:   •  Insulin Syringe 30G X 5/16\" 0.3 ML misc, , Disp: , Rfl:   •  " levETIRAcetam (Keppra) 500 MG tablet, Every 12 (Twelve) Hours., Disp: , Rfl:   •  levETIRAcetam XR (KEPPRA XR) 500 MG 24 hr tablet, , Disp: , Rfl:   •  metOLazone (ZAROXOLYN) 2.5 MG tablet, , Disp: , Rfl:   •  NOVOLOG FLEXPEN 100 UNIT/ML solution pen-injector sc pen, 10 Units 3 (Three) Times a Day With Meals., Disp: , Rfl:   •  potassium chloride (K-DUR,KLOR-CON) 20 MEQ CR tablet, , Disp: , Rfl:   •  potassium chloride (KLOR-CON) 20 MEQ packet, POTASSIUM CHLORIDE 20 MEQ PACK, Disp: , Rfl:   •  pravastatin (PRAVACHOL) 40 MG tablet, , Disp: , Rfl:   •  SSD 1 % cream, , Disp: , Rfl:   •  TRESIBA FLEXTOUCH 100 UNIT/ML solution pen-injector injection, 70 Units Daily., Disp: , Rfl:       Assessment/Plan   1.  Diabetes mellitus type 2: Uncontrolled, at this time I will decrease Tresiba to 60 units subcu daily and increase Humalog/NovoLog to 15 units with each meal and he is advised to watch his blood sugars, always carry glucose source with him in case of low blood sugar and send blood sugar records for review next few weeks.  Also advised to get regular eye exam and flu vaccine.  2.  Hypertension: Well-controlled  3.  Hyperlipidemia: On pravastatin.            Meena Corona MD FACE.

## 2020-03-23 ENCOUNTER — APPOINTMENT (OUTPATIENT)
Dept: CARDIOLOGY | Facility: HOSPITAL | Age: 85
End: 2020-03-23

## 2020-06-10 PROBLEM — C18.0 ADENOCARCINOMA OF CECUM (HCC): Status: ACTIVE | Noted: 2018-02-27

## 2020-06-10 PROBLEM — I50.9 CONGESTIVE HEART FAILURE (HCC): Status: ACTIVE | Noted: 2019-02-06

## 2020-06-18 RX ORDER — DOXYCYCLINE HYCLATE 100 MG/1
100 CAPSULE ORAL 2 TIMES DAILY
COMMUNITY
Start: 2020-03-19 | End: 2020-01-01

## 2020-06-18 RX ORDER — BLOOD-GLUCOSE METER
KIT MISCELLANEOUS
COMMUNITY
Start: 2020-03-24 | End: 2020-01-01

## 2020-08-24 PROBLEM — R73.9 HYPERGLYCEMIA: Status: ACTIVE | Noted: 2020-01-01

## 2020-08-24 NOTE — ED PROVIDER NOTES
Subjective   Chief complaint: High blood sugar    89-year-old male presents with high blood sugar.  Patient states his home health nurse came by today and he had a blood sugar in the 400s.  They also noticed that he seemed to be having a little bit of slurred speech.  Patient states he did not notice this.  The home health nurse had not been there since Friday.  No specific last known well time can be established.  Patient denies any other symptoms.  He has had no numbness, weakness, tingling.  He denies headache.  He denies any vision changes.  No alleviating or exacerbating factors.  He states he really feels fine and is eager to go home.          Review of Systems   Constitutional: Negative for fever.   HENT: Negative for congestion and sore throat.    Eyes: Negative for pain and redness.   Respiratory: Negative for cough and shortness of breath.    Cardiovascular: Negative for chest pain and palpitations.   Gastrointestinal: Negative for abdominal pain and vomiting.   Genitourinary: Negative for dysuria.   Musculoskeletal: Negative for back pain.   Skin: Negative for rash.   Neurological: Positive for speech difficulty. Negative for weakness, numbness and headaches.   Psychiatric/Behavioral: Negative for behavioral problems and confusion.       Past Medical History:   Diagnosis Date   • A-fib (CMS/[facility])    • Hypertension    • Type 2 diabetes mellitus (CMS/[facility])        No Known Allergies    Past Surgical History:   Procedure Laterality Date   • PROSTATE SURGERY         Family History   Problem Relation Age of Onset   • Cancer Father         [facility]        Social History     Socioeconomic History   • Marital status:      Spouse name: Not on file   • Number of children: Not on file   • Years of education: Not on file   • Highest education level: Not on file   Tobacco Use   • Smoking status: Never Smoker   • Smokeless tobacco: Never Used   Substance and Sexual Activity   • Alcohol use: Yes      "Comment: occasinolly       /64   Pulse 78   Temp 98.4 °F (36.9 °C) (Oral)   Resp 12   Ht 177.8 cm (70\")   Wt 82.1 kg (181 lb)   SpO2 97%   BMI 25.97 kg/m²       Objective   Physical Exam   Constitutional: He is oriented to person, place, and time. He appears well-developed and well-nourished.   HENT:   Head: Normocephalic and atraumatic.   Eyes: Pupils are equal, round, and reactive to light. EOM are normal.   Neck: Normal range of motion. Neck supple.   Cardiovascular: Normal rate, regular rhythm and normal heart sounds.   Pulmonary/Chest: Effort normal and breath sounds normal. No respiratory distress.   Abdominal: Soft. Bowel sounds are normal. There is no tenderness.   Musculoskeletal: Normal range of motion.   Neurological: He is alert and oriented to person, place, and time.   No focal motor or sensory deficit.  There is no facial droop.  There is possibly some very subtle dysarthria but speech is very understandable.   Skin: Skin is warm and dry.   Nursing note and vitals reviewed.      Procedures           ED Course      Results for orders placed or performed during the hospital encounter of 08/24/20   Comprehensive Metabolic Panel   Result Value Ref Range    Glucose 512 (C) 65 - 99 mg/dL    BUN      Creatinine 2.05 (H) 0.76 - 1.27 mg/dL    Sodium 132 (L) 136 - 145 mmol/L    Potassium 3.0 (L) 3.5 - 5.2 mmol/L    Chloride 91 (L) 98 - 107 mmol/L    CO2 29.0 22.0 - 29.0 mmol/L    Calcium 8.1 (L) 8.2 - 9.6 mg/dL    Total Protein 7.0 6.0 - 8.5 g/dL    Albumin 3.30 (L) 3.50 - 5.20 g/dL    ALT (SGPT) 17 1 - 41 U/L    AST (SGOT) 17 1 - 40 U/L    Alkaline Phosphatase 71 39 - 117 U/L    Total Bilirubin 0.5 0.0 - 1.2 mg/dL    eGFR Non African Amer 30 (L) >60 mL/min/1.73    Globulin 3.7 gm/dL    A/G Ratio 0.9 g/dL    BUN/Creatinine Ratio      Anion Gap 12.0 5.0 - 15.0 mmol/L   Protime-INR   Result Value Ref Range    Protime 11.3 9.6 - 11.7 Seconds    INR 1.04 0.93 - 1.10   aPTT   Result Value Ref Range    " PTT 28.6 24.0 - 31.0 seconds   Troponin   Result Value Ref Range    Troponin T 0.025 0.000 - 0.030 ng/mL   CBC Auto Differential   Result Value Ref Range    WBC 10.30 3.40 - 10.80 10*3/mm3    RBC 4.82 4.14 - 5.80 10*6/mm3    Hemoglobin 13.7 13.0 - 17.7 g/dL    Hematocrit 41.4 37.5 - 51.0 %    MCV 85.8 79.0 - 97.0 fL    MCH 28.4 26.6 - 33.0 pg    MCHC 33.1 31.5 - 35.7 g/dL    RDW 15.2 12.3 - 15.4 %    RDW-SD 46.4 37.0 - 54.0 fl    MPV 8.6 6.0 - 12.0 fL    Platelets 236 140 - 450 10*3/mm3    Neutrophil % 79.5 (H) 42.7 - 76.0 %    Lymphocyte % 12.2 (L) 19.6 - 45.3 %    Monocyte % 5.1 5.0 - 12.0 %    Eosinophil % 2.2 0.3 - 6.2 %    Basophil % 1.0 0.0 - 1.5 %    Neutrophils, Absolute 8.20 (H) 1.70 - 7.00 10*3/mm3    Lymphocytes, Absolute 1.30 0.70 - 3.10 10*3/mm3    Monocytes, Absolute 0.50 0.10 - 0.90 10*3/mm3    Eosinophils, Absolute 0.20 0.00 - 0.40 10*3/mm3    Basophils, Absolute 0.10 0.00 - 0.20 10*3/mm3    nRBC 0.0 0.0 - 0.2 /100 WBC   BUN   Result Value Ref Range    BUN 58 (H) 8 - 23 mg/dL   Gold Top - SST   Result Value Ref Range    Extra Tube Hold for add-ons.      Ct Head Without Contrast    Result Date: 8/24/2020   1. Volume loss secondary to age-appropriate atrophy. 2. Chronic ischemic changes. 3. No acute findings.  Electronically Signed By-John Cyo On:8/24/2020 4:17 PM This report was finalized on [id number] by  John Coy, .        My interpretation of EKG shows atrial fibrillation, rate of 77, left bundle branch block, unchanged from previous                                     MDM   Patient had the above evaluation.  Results were discussed with the patient.  CT head shows no acute abnormality.  EKG shows no acute ischemia.  Troponin is negative.  Patient was found to have a blood sugar of 512.  No signs of DKA.  Patient may have some mild dehydration with acute kidney injury.  Creatinine is 2.05 and BUN is 58.  Patient was given gentle IV fluids.  He was given 10 units of IV insulin.  Potassium was  3.0 and he was started on potassium replacement.  I discussed with the primary doctor and the patient will be admitted for further evaluation and management.      Final diagnoses:   Hyperglycemia   Acute kidney injury (CMS/HCC)   Hypokalemia   Dysarthria            Casey Lau MD  08/24/20 7540

## 2020-08-25 NOTE — PLAN OF CARE
Patient has been resting comfortably this shift. No new issues noted related to elevated BS. No c/o pain or discomfort. K+ levels were low this morning, replaced per protocol and verbal order via Dr Solares. Kandace on IVF. Will cont to monitor.

## 2020-08-25 NOTE — PLAN OF CARE
Problem: Patient Care Overview  Goal: Plan of Care Review  Outcome: Ongoing (interventions implemented as appropriate)  Flowsheets (Taken 8/25/2020 0500)  Progress: improving  Plan of Care Reviewed With: patient  Outcome Summary: pt blood sugar dropped to 170, will continue to monitor BS, consulted  nephro and endo , pt rested most of the shift with no complaints

## 2020-08-25 NOTE — H&P
Patient Care Team:  John Burleson MD as PCP - General  John Burleson MD as PCP - Family Medicine    Chief Conplaint  Subjective     The patient is a 89 y.o. male presents with acute mental status changes and florid elevation of blood sugar.    HPI  The patient was in his usual state of health until several days prior to presentation with the patient apparently has been having a difficult time managing his blood sugar.  Home health care evaluated the patient on the day of presentation and found him to have some acute mental status changes with blood sugars in the 400 range.  The patient was referred to the [facility] emergency room for evaluation and was admitted    Review of Systems  Review of Systems   Constitutional: Positive for activity change.   Respiratory: Negative.    Cardiovascular: Negative.    Gastrointestinal: Negative for abdominal distention.   Neurological: Positive for speech difficulty and weakness.       History  Past Medical History:   Diagnosis Date   • A-fib (CMS/HCC)    • Cancer (CMS/HCC)     colon   • Hypertension    • Type 2 diabetes mellitus (CMS/HCC)      Past Surgical History:   Procedure Laterality Date   • PROSTATE SURGERY       Family History   Problem Relation Age of Onset   • Cancer Father         multipule mylomia      Social History     Tobacco Use   • Smoking status: Never Smoker   • Smokeless tobacco: Never Used   Substance Use Topics   • Alcohol use: Not Currently   • Drug use: Never     Allergies:  Patient has no known allergies.    Objective     Vital Signs  Temp:  [97.5 °F (36.4 °C)-98.4 °F (36.9 °C)] 97.7 °F (36.5 °C)  Heart Rate:  [72-89] 72  Resp:  [12-18] 18  BP: ()/(36-83) 103/59      Physical Exam:   Physical Exam   Constitutional: He appears well-developed and well-nourished.   HENT:   Head: Normocephalic and atraumatic.   Eyes: Pupils are equal, round, and reactive to light. EOM are normal.   Cardiovascular: Regular rhythm.   Pulmonary/Chest:  Effort normal.   Abdominal: Soft.   Musculoskeletal: Normal range of motion.   Neurological: He is alert.   Vitals reviewed.       Results Review:   CBC    Results from last 7 days   Lab Units 08/25/20  0356 08/24/20  1458   WBC 10*3/mm3 10.70 10.30   HEMOGLOBIN g/dL 14.0 13.7   PLATELETS 10*3/mm3 227 236     BMP   Results from last 7 days   Lab Units 08/25/20  0356 08/24/20  1458   SODIUM mmol/L 136 132*   POTASSIUM mmol/L 2.9* 3.0*   CHLORIDE mmol/L 96* 91*   CO2 mmol/L 29.0 29.0   BUN  57* 58*   CREATININE mg/dL 1.91* 2.05*   GLUCOSE mg/dL 71 512*   MAGNESIUM mg/dL  --  2.1   PHOSPHORUS mg/dL  --  3.3     Cr Clearance Estimated Creatinine Clearance: 28.2 mL/min (A) (by C-G formula based on SCr of 1.91 mg/dL (H)).  Coag   Results from last 7 days   Lab Units 08/24/20  1458   INR  1.04   APTT seconds 28.6     HbA1C No results found for: HGBA1C  Blood Glucose   Glucose   Date/Time Value Ref Range Status   08/25/2020 0503 71 70 - 105 mg/dL Final     Comment:     Serial Number: [id number]:  [id number]   08/24/2020 2351 170 (H) 70 - 105 mg/dL Final     Comment:     Serial Number: [id number]:  [id number]   08/24/2020 2033 411 (C) 70 - 105 mg/dL Final     Comment:     Serial Number: [id number]:  [id number]     Infection     CMP   Results from last 7 days   Lab Units 08/25/20  0356 08/24/20  1458   SODIUM mmol/L 136 132*   POTASSIUM mmol/L 2.9* 3.0*   CHLORIDE mmol/L 96* 91*   CO2 mmol/L 29.0 29.0   BUN  57* 58*   CREATININE mg/dL 1.91* 2.05*   GLUCOSE mg/dL 71 512*   ALBUMIN g/dL  --  3.30*   BILIRUBIN mg/dL  --  0.5   ALK PHOS U/L  --  71   AST (SGOT) U/L  --  17   ALT (SGPT) U/L  --  17     UA    Results from last 7 days   Lab Units 08/24/20  1629   NITRITE UA  Negative   WBC UA /HPF None Seen   BACTERIA UA /HPF None Seen   SQUAM EPITHEL UA /HPF None Seen     Radiology(recent) Ct Head Without Contrast    Result Date: 8/24/2020   1. Volume loss secondary to age-appropriate atrophy. 2. Chronic ischemic  changes. 3. No acute findings.  Electronically Signed By-John Coy On:8/24/2020 4:17 PM This report was finalized on [id number] by  John Coy, .       Assessment:      Hyperglycemia  Profound hyperglycemia  Mental status changes secondary to the above and below   hyperosmolar state  Diabetes mellitus type 2 with neuropathic, renal and angiopathic manifestations  Chronic systolic congestive heart failure / CHFrEF 27%  Cerebrovascular disease status post CVA  Seizure disorder  Diabetic dyslipidemia  Chronic kidney disease stage III secondary to hypertensive nephropathy and DGS  History of colon cancer  History of right hemicolectomy  Secondary thrombophilia  Atrial fibrillation, chronic  Gastroesophageal reflux disease without gastritis  Degenerative disc disease lumbosacral spine  Degenerative joint disease  Vitamin B12 deficiency  Chronic insulin use  BPH with LUTS  Chronic pancreatitis  Normocytic normochromic anemia  Coronary artery calcifications  LVH  Pulmonary hypertension  Hypertension associated with chronic kidney disease stage III  Atherosclerotic heart disease of native coronary arteries with angina pectoris  Hypokalemia      Plan:  Gentle parenteral fluids//glycemic control//will need endocrine follow-up//physical therapy if necessary//electrolyte maintenance  Expected Length of Stay 1-2 days    I discussed the patients findings and my recommendations with patient and nursing staff.     John Burleson MD  08/25/20  07:17

## 2020-08-25 NOTE — CONSULTS
Referring Provider: Dr. Yuliana Freeman, Dr John Burleson  Reason for Consultation:Renal insufficiency    Chief complaint . Hypoglycemia    History of present illness:    Patient is 89 years old  male with a history of hypertension, chronic Kidney disease, diabetes came to the hospital with the high blood sugar.  As per report, he was evaluated by home health care and found him somewhat confused and his blood sugar was above 400.  Patient was feeling tired and complained of polyuria and dry mouth.  Denies any chest pain, shortness of breath, nausea or vomiting.  Denied any hematuria or dysuria.  His creatinine was 2.0 yesterday and 1.9 this morning along with hypokalemia which prompted renal consult.  Patient received IV fluids overnight in form of normal saline.    History  Past Medical History:   Diagnosis Date   • A-fib (CMS/HCC)    • Cancer (CMS/HCC)     colon   • Hypertension    • Type 2 diabetes mellitus (CMS/HCC)      Past Surgical History:   Procedure Laterality Date   • PROSTATE SURGERY       Social History     Tobacco Use   • Smoking status: Never Smoker   • Smokeless tobacco: Never Used   Substance Use Topics   • Alcohol use: Not Currently   • Drug use: Never     Family History   Problem Relation Age of Onset   • Cancer Father         multipule mylomia        Review of Systems  ROS  All systems reviewed, negative except as mentioned in HPI  Objective     Vital Signs  Temp:  [97.5 °F (36.4 °C)-98.4 °F (36.9 °C)] 97.7 °F (36.5 °C)  Heart Rate:  [72-89] 72  Resp:  [12-18] 18  BP: ()/(36-83) 103/59    No intake/output data recorded.  I/O last 3 completed shifts:  In: 740 [P.O.:240; IV Piggyback:500]  Out: 250 [Urine:250]    Physical Exam:  Physical Exam  General.  Alert, awake, NAD  Head.  Atraumatic, normocephalic  ENT.  Oral mucosa moist  Respiratory.  Clear to auscultation bilaterally  Cardiovascular.  Irregular rhythm  Abdomen.  Soft, nontender  Extremities.  No edema  Results Review:   I  reviewed the patient's new clinical results.    Lab Results   Component Value Date    CALCIUM 8.1 (L) 08/25/2020    PHOS 3.3 08/24/2020     Results from last 7 days   Lab Units 08/25/20  0356 08/24/20  1458   MAGNESIUM mg/dL  --  2.1   SODIUM mmol/L 136 132*   POTASSIUM mmol/L 2.9* 3.0*   CHLORIDE mmol/L 96* 91*   CO2 mmol/L 29.0 29.0   BUN  57* 58*   CREATININE mg/dL 1.91* 2.05*   GLUCOSE mg/dL 71 512*   CALCIUM mg/dL 8.1* 8.1*   WBC 10*3/mm3 10.70 10.30   HEMOGLOBIN g/dL 14.0 13.7   PLATELETS 10*3/mm3 227 236   ALT (SGPT) U/L  --  17   AST (SGOT) U/L  --  17     Lab Results   Component Value Date    CKTOTAL 63 07/13/2018    TROPONINI <0.03 12/21/2018    TROPONINT 0.025 08/24/2020     Estimated Creatinine Clearance: 28.2 mL/min (A) (by C-G formula based on SCr of 1.91 mg/dL (H)).No results found for: URICACID    Brief Urine Lab Results  (Last result in the past 365 days)      Color   Clarity   Blood   Leuk Est   Nitrite   Protein   CREAT   Urine HCG        08/24/20 1629 Yellow Clear Large (3+) Negative Negative Negative               Prior to Admission medications    Medication Sig Start Date End Date Taking? Authorizing Provider   aspirin (Aspir-Low) 81 MG EC tablet Take 81 mg by mouth Daily. 6/12/17  Yes Provider, Historical, MD   bisoprolol (ZEBeta) 5 MG tablet Take 5 mg by mouth Daily. 2/17/20  Yes Provider, Historical, MD   escitalopram (LEXAPRO) 10 MG tablet Take 10 mg by mouth Daily. 3/18/20  Yes Provider, Historical, MD   finasteride (PROSCAR) 5 MG tablet Take 5 mg by mouth Daily. 3/18/20  Yes Provider, Historical, MD   furosemide (LASIX) 20 MG tablet Take 40 mg by mouth Daily. 3/18/20  Yes Provider, Historical, MD   levETIRAcetam XR (KEPPRA XR) 500 MG 24 hr tablet Take 2,000 mg by mouth Daily. 3/18/20  Yes Provider, Historical, MD   metOLazone (ZAROXOLYN) 2.5 MG tablet Take 2.5 mg by mouth Daily. 3/18/20  Yes Provider, MD Samatnha   NOVOLOG FLEXPEN 100 UNIT/ML solution pen-injector sc pen Inject 15  units before each meal  Patient taking differently: Inject 10 Units under the skin into the appropriate area as directed Every Evening. Inject 15 units before each meal 3/19/20  Yes Meena Corona MD   potassium chloride (K-DUR,KLOR-CON) 20 MEQ CR tablet Take 40 mEq by mouth Daily. 3/18/20  Yes ProviderSamantha MD   pravastatin (PRAVACHOL) 40 MG tablet Take 40 mg by mouth Daily. 3/18/20  Yes Provider, MD Samantha   TRESIBA FLEXTOUCH 100 UNIT/ML solution pen-injector injection Inject 60 Units under the skin into the appropriate area as directed Daily.  Patient taking differently: Inject 63 Units under the skin into the appropriate area as directed Daily. 3/19/20  Yes Meena Corona MD         aspirin 81 mg Oral Daily   atorvastatin 10 mg Oral Daily   bisoprolol 5 mg Oral Daily   escitalopram 10 mg Oral Daily   finasteride 5 mg Oral Daily   heparin (porcine) 5,000 Units Subcutaneous Q12H   insulin glargine 63 Units Subcutaneous Nightly   insulin lispro 0-24 Units Subcutaneous TID AC   levETIRAcetam XR 1,000 mg Oral Daily   sodium chloride 10 mL Intravenous Q12H       sodium chloride 100 mL/hr Last Rate: 100 mL/hr (08/25/20 0626)       Assessment/Plan       1.  Acute kidney injury on CKD 3( baseline creatinine around 1.4 mg/DL)  Most likely prerenal due to osmotic diuresis  Creatinine little better.  Continue IV hydration  Microhematuria noticed on urinalysis.  Will order renal ultrasound  I agree holding metolazone    2. Hypokalemia  Oral potassium replacement    3.  Hypertension  Blood pressure okay this AM.  Patient was hypotensive on presentation, probably due to hypovolemia    4.  Hyperglycemia  Improving.  Management per primary team    I discussed the patients findings and my recommendations with patient and nursing staff    Arnav Solares MD  08/25/20  08:48

## 2020-08-25 NOTE — CONSULTS
[facility]    Referring Provider: John Burleson MD  Reason for Consultation: Diabetes evaluation & management.    Patient Care Team:  John Burleson MD as PCP - General  John Burleson MD as PCP - Family Medicine    Chief complaint Hyperglycemia      Subjective .     History of present illness:    This is a  89 y.o. male with type 2 Diabetes x 10y, on Tresiba 63 units QHS at home.  Admitted with mental status changes & high blood sugars.   Feeling much better hydration & insulin Rx.  He was last seen by  @ the [facility] on 3/19/2020.   At the time he was on Tresiba 70 units & Novolog 10 units ac. He had decreased the Humalog due to hypoglycemia.   cut his Tresiba dose to 60 units & increased the Novolog to 15 units. The A1C was 8.9% on 3/6/2020.  He had a f/u appt with  on 6/18/2020. He no showed.  Pt says he has not been taking the premeal Novolog doses, but always takes the Tresiba.    Review of Systems  Review of Systems   Constitutional: Positive for unexpected weight loss.   HENT: Negative for trouble swallowing.    Eyes: Negative for blurred vision.   Respiratory: Negative for shortness of breath.    Cardiovascular: Negative for leg swelling.   Gastrointestinal: Negative for nausea.   Endocrine: Positive for polydipsia and polyuria.   Neurological: Positive for speech difficulty. Negative for headache.       History  Past Medical History:   Diagnosis Date   • A-fib (CMS/HCC)    • Cancer (CMS/HCC)     colon   • Hypertension    • Type 2 diabetes mellitus (CMS/HCC)      Past Surgical History:   Procedure Laterality Date   • PROSTATE SURGERY       Family History   Problem Relation Age of Onset   • Cancer Father         multipule mylomia      Social History     Tobacco Use   • Smoking status: Never Smoker   • Smokeless tobacco: Never Used   Substance Use Topics   • Alcohol use: Not Currently   • Drug use: Never     Medications Prior to Admission      Medication Sig Dispense Refill Last Dose   • aspirin (Aspir-Low) 81 MG EC tablet Take 81 mg by mouth Daily.   Taking   • bisoprolol (ZEBeta) 5 MG tablet Take 5 mg by mouth Daily.   Taking   • escitalopram (LEXAPRO) 10 MG tablet Take 10 mg by mouth Daily.   Taking   • finasteride (PROSCAR) 5 MG tablet Take 5 mg by mouth Daily.   Taking   • furosemide (LASIX) 20 MG tablet Take 40 mg by mouth Daily.   Taking   • levETIRAcetam XR (KEPPRA XR) 500 MG 24 hr tablet Take 2,000 mg by mouth Daily.   Taking   • metOLazone (ZAROXOLYN) 2.5 MG tablet Take 2.5 mg by mouth Daily.   Taking   • NOVOLOG FLEXPEN 100 UNIT/ML solution pen-injector sc pen Inject 15 units before each meal (Patient taking differently: Inject 10 Units under the skin into the appropriate area as directed Every Evening. Inject 15 units before each meal) 10 pen 6 Patient Taking Differently at Unknown time   • potassium chloride (K-DUR,KLOR-CON) 20 MEQ CR tablet Take 40 mEq by mouth Daily.   Taking   • pravastatin (PRAVACHOL) 40 MG tablet Take 40 mg by mouth Daily.   Taking   • TRESIBA FLEXTOUCH 100 UNIT/ML solution pen-injector injection Inject 60 Units under the skin into the appropriate area as directed Daily. (Patient taking differently: Inject 63 Units under the skin into the appropriate area as directed Daily.) 10 pen 6      Scheduled Meds:    aspirin 81 mg Oral Daily   atorvastatin 10 mg Oral Daily   bisoprolol 5 mg Oral Daily   escitalopram 10 mg Oral Daily   finasteride 5 mg Oral Daily   heparin (porcine) 5,000 Units Subcutaneous Q12H   insulin glargine 50 Units Subcutaneous Nightly   insulin lispro 0-24 Units Subcutaneous TID AC   insulin lispro 5 Units Subcutaneous TID With Meals   levETIRAcetam XR 1,000 mg Oral Daily   sodium chloride 10 mL Intravenous Q12H     Continuous Infusions:    sodium chloride 100 mL/hr Last Rate: 100 mL/hr (08/25/20 0626)     PRN Meds:  •  acetaminophen **OR** acetaminophen **OR** acetaminophen  •  bisacodyl  •  calcium  carbonate  •  dextrose  •  dextrose  •  glucagon (human recombinant)  •  HYDROcodone-acetaminophen  •  insulin lispro **AND** insulin lispro  •  magnesium hydroxide  •  ondansetron **OR** ondansetron  •  potassium chloride **OR** potassium chloride **OR** potassium chloride  •  potassium chloride  •  [COMPLETED] Insert peripheral IV **AND** sodium chloride  •  sodium chloride  Allergies:  Patient has no known allergies.    Objective     Vital Signs   Temp:  [97.5 °F (36.4 °C)-98.3 °F (36.8 °C)] 97.6 °F (36.4 °C)  Heart Rate:  [66-89] 66  Resp:  [14-18] 16  BP: ()/(46-83) 117/72    Physical Exam:     General Appearance:    Alert, cooperative, in no acute distress   Head:    Normocephalic, without obvious abnormality, atraumatic   Eyes:            Lids and lashes normal, conjunctivae and sclerae normal, no   icterus, no pallor, corneas clear, PERRLA   Throat:   No oral lesions,  oral mucosa moist   Neck:   No adenopathy, supple,  no thyromegaly, no   carotid bruit   Lungs:     Clear     Heart:    Regular rhythm and normal rate   Chest Wall:    No abnormalities observed   Abdomen:     Normal bowel sounds, soft                 Extremities:   Moves all extremities well, no edema               Pulses:   Pulses palpable and equal bilaterally   Skin:   Dry   Neurologic:  DTR absent, unable to feel the 10g monofilament       Results Review  I have reviewed the patient's new clinical results, labs & imaging.    Lab Results (last 24 hours)     Procedure Component Value Units Date/Time    POC Glucose Once [[id number]]  (Abnormal) Collected:  08/25/20 1638    Specimen:  Blood Updated:  08/25/20 1642     Glucose 230 mg/dL      Comment: Serial Number: [id number]:  [id number]       Basic Metabolic Panel [[id number]]  (Abnormal) Collected:  08/25/20 1445    Specimen:  Blood Updated:  08/25/20 1600     Glucose 222 mg/dL      BUN --     Comment: Testing performed by alternate method        Creatinine 1.79 mg/dL      Sodium  137 mmol/L      Potassium 3.4 mmol/L      Chloride 98 mmol/L      CO2 27.0 mmol/L      Calcium 8.0 mg/dL      eGFR Non African Amer 36 mL/min/1.73      BUN/Creatinine Ratio --     Comment: Testing not performed        Anion Gap 12.0 mmol/L     Narrative:       GFR Normal >60  Chronic Kidney Disease <60  Kidney Failure <15      BUN [[id number]] Collected:  08/25/20 1445    Specimen:  Blood Updated:  08/25/20 1600    CBC & Differential [[id number]] Collected:  08/25/20 1445    Specimen:  Blood Updated:  08/25/20 1544    Narrative:       The following orders were created for panel order CBC & Differential.  Procedure                               Abnormality         Status                     ---------                               -----------         ------                     CBC Auto Differential[[id number]]        Abnormal            Final result                 Please view results for these tests on the individual orders.    CBC Auto Differential [[id number]]  (Abnormal) Collected:  08/25/20 1445    Specimen:  Blood Updated:  08/25/20 1544     WBC 10.30 10*3/mm3      RBC 4.86 10*6/mm3      Hemoglobin 14.0 g/dL      Hematocrit 41.7 %      MCV 85.8 fL      MCH 28.8 pg      MCHC 33.5 g/dL      RDW 15.2 %      RDW-SD 45.5 fl      MPV 9.2 fL      Platelets 251 10*3/mm3      Neutrophil % 79.3 %      Lymphocyte % 11.3 %      Monocyte % 5.7 %      Eosinophil % 3.3 %      Basophil % 0.4 %      Neutrophils, Absolute 8.10 10*3/mm3      Lymphocytes, Absolute 1.20 10*3/mm3      Monocytes, Absolute 0.60 10*3/mm3      Eosinophils, Absolute 0.30 10*3/mm3      Basophils, Absolute 0.00 10*3/mm3      nRBC 0.0 /100 WBC     Hemoglobin A1c [[id number]]  (Abnormal) Collected:  08/25/20 0356    Specimen:  Blood Updated:  08/25/20 1258     Hemoglobin A1C 11.9 %     Narrative:       Hemoglobin A1C Reference Range:    <5.7 %        Normal  5.7-6.4 %     Increased risk for diabetes  > 6.4 %        Diabetes       These guidelines have been  recommended by the American Diabetic Association for Hgb A1c.      The following 2010 guidelines have been recommended by the American Diabetes Association for Hemoglobin A1c.    HBA1c 5.7-6.4% Increased risk for future diabetes (pre-diabetes)  HBA1c     >6.4% Diabetes      Sodium, Urine, Random - Urine, Clean Catch [[id number]] Collected:  08/25/20 0931    Specimen:  Urine, Clean Catch Updated:  08/25/20 1116     Sodium, Urine 21 mmol/L     Narrative:       Reference intervals for random urine have not been established.  Clinical usage is dependent upon physician's interpretation in combination with other laboratory tests.       POC Glucose Once [[id number]]  (Abnormal) Collected:  08/25/20 1104    Specimen:  Blood Updated:  08/25/20 1108     Glucose 171 mg/dL      Comment: Serial Number: [id number]:  [id number]       Urinalysis With Culture If Indicated - Urine, Clean Catch [[id number]]  (Abnormal) Collected:  08/25/20 0931    Specimen:  Urine, Clean Catch Updated:  08/25/20 1046     Color, UA Yellow     Appearance, UA Cloudy     Comment: Result checked         pH, UA 6.0     Specific Gravity, UA 1.014     Glucose,  mg/dL (Trace)     Ketones, UA Negative     Bilirubin, UA Negative     Blood, UA Large (3+)     Protein, UA Trace     Leuk Esterase, UA Negative     Nitrite, UA Negative     Urobilinogen, UA 1.0 E.U./dL    Urinalysis, Microscopic Only - Urine, Clean Catch [[id number]]  (Abnormal) Collected:  08/25/20 0931    Specimen:  Urine, Clean Catch Updated:  08/25/20 1046     RBC, UA Too Numerous to Count /HPF      WBC, UA 0-2 /HPF      Bacteria, UA None Seen /HPF      Squamous Epithelial Cells, UA None Seen /HPF      Hyaline Casts, UA None Seen /LPF      Methodology Automated Microscopy    POC Glucose Once [[id number]]  (Abnormal) Collected:  08/25/20 0718    Specimen:  Blood Updated:  08/25/20 0723     Glucose 137 mg/dL      Comment: Serial Number: [id number]:  [id number]       BUN [[id number]]   (Abnormal) Collected:  08/25/20 0356    Specimen:  Blood Updated:  08/25/20 0713     BUN 57 mg/dL     POC Glucose Once [[id number]]  (Normal) Collected:  08/25/20 0503    Specimen:  Blood Updated:  08/25/20 0505     Glucose 71 mg/dL      Comment: Serial Number: [id number]:  [id number]       Basic Metabolic Panel [[id number]]  (Abnormal) Collected:  08/25/20 0356    Specimen:  Blood Updated:  08/25/20 0455     Glucose 71 mg/dL      BUN --     Comment: Testing performed by alternate method        Creatinine 1.91 mg/dL      Sodium 136 mmol/L      Potassium 2.9 mmol/L      Comment: Specimen hemolyzed.  Results may be affected.        Chloride 96 mmol/L      CO2 29.0 mmol/L      Calcium 8.1 mg/dL      eGFR Non African Amer 33 mL/min/1.73      BUN/Creatinine Ratio --     Comment: Testing not performed        Anion Gap 11.0 mmol/L     Narrative:       GFR Normal >60  Chronic Kidney Disease <60  Kidney Failure <15      CBC & Differential [[id number]] Collected:  08/25/20 0356    Specimen:  Blood Updated:  08/25/20 0427    Narrative:       The following orders were created for panel order CBC & Differential.  Procedure                               Abnormality         Status                     ---------                               -----------         ------                     CBC Auto Differential[[id number]]        Abnormal            Final result                 Please view results for these tests on the individual orders.    CBC Auto Differential [[id number]]  (Abnormal) Collected:  08/25/20 0356    Specimen:  Blood Updated:  08/25/20 0427     WBC 10.70 10*3/mm3      RBC 4.92 10*6/mm3      Hemoglobin 14.0 g/dL      Hematocrit 41.4 %      MCV 84.1 fL      MCH 28.4 pg      MCHC 33.7 g/dL      RDW 15.4 %      RDW-SD 45.9 fl      MPV 8.4 fL      Platelets 227 10*3/mm3      Neutrophil % 72.4 %      Lymphocyte % 16.5 %      Monocyte % 6.9 %      Eosinophil % 3.8 %      Basophil % 0.4 %      Neutrophils, Absolute 7.80  10*3/mm3      Lymphocytes, Absolute 1.80 10*3/mm3      Monocytes, Absolute 0.70 10*3/mm3      Eosinophils, Absolute 0.40 10*3/mm3      Basophils, Absolute 0.00 10*3/mm3      nRBC 0.2 /100 WBC     POC Glucose Once [[id number]]  (Abnormal) Collected:  08/24/20 2351    Specimen:  Blood Updated:  08/24/20 2353     Glucose 170 mg/dL      Comment: Serial Number: [id number]:  [id number]       TSH [[id number]]  (Normal) Collected:  08/24/20 1458    Specimen:  Blood Updated:  08/24/20 2113     TSH 1.140 uIU/mL     Magnesium [[id number]]  (Normal) Collected:  08/24/20 1458    Specimen:  Blood Updated:  08/24/20 2104     Magnesium 2.1 mg/dL     Phosphorus [[id number]]  (Normal) Collected:  08/24/20 1458    Specimen:  Blood Updated:  08/24/20 2104     Phosphorus 3.3 mg/dL     POC Glucose Once [[id number]]  (Abnormal) Collected:  08/24/20 2033    Specimen:  Blood Updated:  08/24/20 2034     Glucose 411 mg/dL      Comment: Serial Number: [id number]:  [id number]           Lab Results   Component Value Date    HGBA1C 11.9 (H) 08/25/2020     Lab Results   Component Value Date    TSH 1.140 08/24/2020         Assessment/Plan     1. Mental status changes, resolved  2. Diabetes, type 2, with hyperglycemia & nephropathy    Will decrease Lantus since he had a significant drop in blood sugar overnight.  Add small dose of scheduled premeal Humalog to prevent post prandial hyperglycemia.  Will check blood sugar @ 2 am to make sure his sugar doesn't drop.  Will follow with you.  Thank you for the consult.      I discussed the patients findings and my recommendations with patient    Colton Ramos MD  08/25/20  17:15

## 2020-08-26 NOTE — PROGRESS NOTES
"   LOS: 0 days    Patient Care Team:  John Burleson MD as PCP - General  John Burleson MD as PCP - Family Medicine      Subjective     Patient resting comfortably.  Denies any chest pain, shortness of breath, nausea or vomiting    Objective     Vital Sign Min/Max for last 24 hours  Temp:  [97.6 °F (36.4 °C)-98.7 °F (37.1 °C)] 98.7 °F (37.1 °C)  Heart Rate:  [66-83] 71  Resp:  [16] 16  BP: (110-140)/(67-73) 110/67                       Flowsheet Rows      First Filed Value   Admission Height  177.8 cm (70\") Documented at 08/24/2020 1456   Admission Weight  82.1 kg (181 lb) Documented at 08/24/2020 1456          No intake/output data recorded.  I/O last 3 completed shifts:  In: 1460 [P.O.:960; IV Piggyback:500]  Out: 550 [Urine:550]    Physical Exam:  Physical Exam    General.  Alert, awake, NAD  Head.  Atraumatic, normocephalic  ENT.  Oral mucosa moist  Respiratory.  Clear to auscultation bilaterally  Cardiovascular.  Irregular rhythm  Abdomen.  Soft, nontender  Extremities.  No edema     LABS:  Lab Results   Component Value Date    CALCIUM 8.0 (L) 08/26/2020    PHOS 2.2 (L) 08/26/2020     Results from last 7 days   Lab Units 08/26/20  0033 08/25/20  1445 08/25/20  0356 08/24/20  1458   MAGNESIUM mg/dL  --   --   --  2.1   SODIUM mmol/L 138 137 136 132*   POTASSIUM mmol/L 3.4* 3.4* 2.9* 3.0*   CHLORIDE mmol/L 100 98 96* 91*   CO2 mmol/L 27.0 27.0 29.0 29.0   BUN  45* 49* 57* 58*   CREATININE mg/dL 1.72* 1.79* 1.91* 2.05*   GLUCOSE mg/dL 208* 222* 71 512*   CALCIUM mg/dL 8.0* 8.0* 8.1* 8.1*   WBC 10*3/mm3 8.50 10.30 10.70 10.30   HEMOGLOBIN g/dL 13.2 14.0 14.0 13.7   PLATELETS 10*3/mm3 221 251 227 236   ALT (SGPT) U/L  --   --   --  17   AST (SGOT) U/L  --   --   --  17     Lab Results   Component Value Date    CKTOTAL 63 07/13/2018    TROPONINI <0.03 12/21/2018    TROPONINT 0.025 08/24/2020     Estimated Creatinine Clearance: 31.7 mL/min (A) (by C-G formula based on SCr of 1.72 mg/dL (H)).      Brief Urine Lab " Results  (Last result in the past 365 days)      Color   Clarity   Blood   Leuk Est   Nitrite   Protein   CREAT   Urine HCG        08/25/20 0931 Yellow Cloudy  Comment:  Result checked  Large (3+) Negative Negative Trace             WEIGHTS:     Wt Readings from Last 1 Encounters:   08/26/20 0322 83.5 kg (184 lb 1.4 oz)   08/25/20 0300 82.5 kg (181 lb 14.1 oz)   08/24/20 1811 82.2 kg (181 lb 3.5 oz)   08/24/20 1456 82.1 kg (181 lb)         aspirin 81 mg Oral Daily   atorvastatin 10 mg Oral Daily   bisoprolol 5 mg Oral Daily   escitalopram 10 mg Oral Daily   finasteride 5 mg Oral Daily   heparin (porcine) 5,000 Units Subcutaneous Q12H   insulin glargine 50 Units Subcutaneous Nightly   insulin lispro 0-24 Units Subcutaneous TID AC   insulin lispro 5 Units Subcutaneous TID With Meals   levETIRAcetam XR 1,000 mg Oral Daily   sodium chloride 10 mL Intravenous Q12H       sodium chloride 100 mL/hr Last Rate: 100 mL/hr (08/25/20 2059)       Assessment/Plan       1.  Acute kidney injury on CKD 3( baseline creatinine around 1.4 mg/DL)  Prerenal.  Renal function improving slowly  Volume status okay.  Hematuria noticed on urinalysis.  Renal ultrasound okay     2. Hypokalemia  Oral potassium replacement     3.  Hypertension  Blood pressure okay this AM.      4.  Hyperglycemia  Improving.  Management per primary team    Okay to discharge from renal standpoint  Follow-up in 2 to 3 months as outpatient    Arnav Solares MD  08/26/20  09:34

## 2020-08-26 NOTE — DISCHARGE SUMMARY
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  History of colon cancer  History of right hemicolectomy  Secondary thrombophilia  Atrial fibrillation, chronic  Gastroesophageal reflux disease without gastritis  Degenerative disc disease lumbosacral spine  Degenerative joint disease  Vitamin B12 deficiency  Chronic insulin use  BPH with LUTS  Chronic pancreatitis  Normocytic normochromic anemia  Coronary artery calcifications  LVH  Pulmonary hypertension  Hypertension associated with chronic kidney disease stage III  Atherosclerotic heart disease of native coronary arteries with angina pectoris  Hypokalemia    Presenting Problem/History of Present Illness  Active Hospital Problems    Diagnosis  POA   • **Hyperglycemia [R73.9]  Yes      Resolved Hospital Problems   No resolved problems to display.          Hospital Course  Patient is a 89 y.o. male presented with acute mental status changes and profound hyperglycemia.  The patient was also found to have acute renal insufficiency with acute kidney injury superimposed upon his chronic disease stage III.  The patient responded quite well to generous parenteral fluids and improved overall.  Endocrinology was asked to evaluate the patient and we will request close outpatient follow-up as well.  Insulin was adjusted and modified extensively and the patient did well overall and was deemed stable for discharge home to assisted living today for close [facility] and with endocrinology as directed.  There were no other complications throughout  the patient's hospital stay.    Procedures Performed         Consults:   Consults     Date and Time Order Name Status Description    8/25/2020 0030 Inpatient Nephrology Consult Completed     8/25/2020 0030 Inpatient Endocrinology Consult Completed     8/24/2020 1625 Family Medicine Consult Completed           Pertinent Test Results:Ct Head Without Contrast    Result Date: 8/24/2020   1. Volume loss secondary to age-appropriate atrophy. 2. Chronic ischemic changes. 3. No acute findings.  Electronically Signed By-John Coy On:8/24/2020 4:17 PM This report was finalized on [id number] by  oJhn Coy, .    Us Renal Bilateral    Result Date: 8/25/2020  Bilateral renal cysts, measuring up to 6.4 cm. No hydronephrosis.  Electronically Signed By-Ella Kumari On:8/25/2020 10:16 AM This report was finalized on [id number] by  Ella Kumari, .      Imaging Results (Last 7 Days)     Procedure Component Value Units Date/Time    US Renal Bilateral [[id number]] Collected:  08/25/20 1014     Updated:  08/25/20 1038    Narrative:       DATE OF EXAM:  8/25/2020 9:38 AM     PROCEDURE:  US RENAL BILATERAL-     INDICATIONS:  acute kidney injury on CKD, microhematuria; R73.9-Hyperglycemia,  unspecified; N17.9-Acute kidney failure, unspecified; E87.6-Hypokalemia;  R47.1-Dysarthria and anarthria     COMPARISON:  CT abdomen and pelvis without and with contrast 08/22/2018.     TECHNIQUE:   Grayscale and color Doppler ultrasound evaluation of the kidneys and  urinary bladder was performed.     FINDINGS:  Right kidney measures 10.6 x 3.7 x 3.9 cm and contains at least 4 cysts,  measuring up to 3.8 cm. Cortical echogenicity appears within normal  limits. No hydronephrosis.     Left kidney measures 9.8 x 4.8 x 4.8 cm and contains at least 2 cysts,  measuring up to 6.4 cm. Cortical echogenicity appears within normal  limits. No hydronephrosis.     No urinary bladder abnormality is visualized. Estimated bladder volume  is 114 mL.         Impression:       Bilateral renal cysts, measuring up to 6.4 cm. No hydronephrosis.     Electronically Signed By-Ella Kumari On:8/25/2020 10:16 AM  This report was finalized on [id number] by  Ella Kumari, .    CT Head Without Contrast [[id number]] Collected:  08/24/20 1615     Updated:  08/24/20 1619    Narrative:          DATE OF EXAM:  8/24/2020 4:07 PM     PROCEDURE:   CT HEAD WO CONTRAST-     INDICATIONS:   Slurred speech.     COMPARISON:  05/27/2018.     TECHNIQUE:   Routine transaxial cuts were obtained through the head without the  administration of contrast. Automated exposure control and iterative  reconstruction methods were used.      FINDINGS:  There is enlargement of the sulci, fissures, ventricles, and basal  cisterns indicating volume loss secondary to age-appropriate atrophy.  There are stable areas of decreased density in the white matter tracts  and basal ganglia consistent with chronic microvascular ischemia and old  lacunar infarcts similar to the prior study. There is no acute  hemorrhage, midline shift, or suspicious extra-axial fluid collections.  There are atherosclerotic vascular calcifications within the vertebral  and internal carotid arteries. The orbital contents are normal. The  visualized paranasal and mastoid sinuses are clear. The calvarium is  normal.        Impression:          1. Volume loss secondary to age-appropriate atrophy.  2. Chronic ischemic changes.  3. No acute findings.     Electronically Signed By-John Coy On:8/24/2020 4:17 PM  This report was finalized on [id number] by  John Coy, .          Condition on Discharge:  Fair    Vital Signs  Temp:  [97.6 °F (36.4 °C)-98.7 °F (37.1 °C)] 98.7 °F (37.1 °C)  Heart Rate:  [66-83] 71  Resp:  [16] 16  BP: (110-140)/(67-73) 110/67    Physical Exam:     General Appearance:    Alert, cooperative, in no acute distress   Head:    Normocephalic, without obvious abnormality, atraumatic   Eyes:           Conjunctivae and  sclerae normal, no   icterus, no pallor, corneas clear, PERRLA   Throat:   No oral lesions, no thrush, oral mucosa moist   Neck:   No adenopathy, supple, trachea midline, no thyromegaly, no   carotid bruit, no JVD   Lungs:     Clear to auscultation,respirations regular, even and                  unlabored    Heart:    Regular rhythm and normal rate, normal S1 and S2, no            murmur, no gallop, no rub, no click   Chest Wall:    No abnormalities observed   Abdomen:     Normal bowel sounds, no masses, no organomegaly, soft        non-tender, non-distended, no guarding, no rebound                tenderness   Rectal:     Deferred   Extremities:   Moves all extremities well, no edema, no cyanosis, no             redness   Pulses:   Pulses palpable and equal bilaterally   Skin:   No bleeding, bruising or rash   Lymph nodes:   No palpable adenopathy   Neurologic:   Cranial nerves 2 - 12 grossly intact, sensation intact, DTR       present and equal bilaterally         Discharge Disposition  Assisted living in fair condition    Discharge Medications     Discharge Medications      New Medications      Instructions Start Date   insulin glargine 100 UNIT/ML injection  Commonly known as:  LANTUS  Replaces:  Tresiba FlexTouch 100 UNIT/ML solution pen-injector injection   50 Units, Subcutaneous, Nightly      insulin lispro 100 UNIT/ML injection  Commonly known as:  humaLOG   5 Units, Subcutaneous, 3 Times Daily With Meals         Continue These Medications      Instructions Start Date   Aspir-Low 81 MG EC tablet  Generic drug:  aspirin   81 mg, Oral, Daily      bisoprolol 5 MG tablet  Commonly known as:  ZEBeta   5 mg, Oral, Daily      escitalopram 10 MG tablet  Commonly known as:  LEXAPRO   10 mg, Oral, Daily      finasteride 5 MG tablet  Commonly known as:  PROSCAR   5 mg, Oral, Daily      furosemide 20 MG tablet  Commonly known as:  LASIX   40 mg, Oral, Daily      levETIRAcetam  MG 24 hr tablet  Commonly known as:   KEPPRA XR   2,000 mg, Oral, Daily      potassium chloride 20 MEQ CR tablet  Commonly known as:  K-DUR,KLOR-CON   40 mEq, Oral, Daily      pravastatin 40 MG tablet  Commonly known as:  PRAVACHOL   40 mg, Oral, Daily         Stop These Medications    metOLazone 2.5 MG tablet  Commonly known as:  ZAROXOLYN     NovoLOG FlexPen 100 UNIT/ML solution pen-injector sc pen  Generic drug:  insulin aspart     Tresiba FlexTouch 100 UNIT/ML solution pen-injector injection  Generic drug:  insulin degludec  Replaced by:  insulin glargine 100 UNIT/ML injection            Discharge Diet:   Cardiac ADA 2000-calorie     activity at Discharge:   As tolerated    Follow-up Appointments  Endocrinology within 1 to 2 weeks time  May follow-up with us within 1 to 2 weeks time either in person or through video visit  [facility]      Test Results Pending at Discharge       John Burleson MD  08/26/20  07:21

## 2020-08-26 NOTE — PROGRESS NOTES
Continued Stay Note  NELLI Villagran     Patient Name: Jim Montaño  MRN: [id number]  Today's Date: 8/26/2020    Admit Date: 8/24/2020    Discharge Plan     Row Name 08/26/20 0809       Plan    Plan  ME Plan: return to [facility] with [facility], pending acceptance.               Expected Discharge Date and Time     Expected Discharge Date Expected Discharge Time    Aug 26, 2020             Bonnie Reynolds RN

## 2020-08-26 NOTE — PLAN OF CARE
Problem: Patient Care Overview  Goal: Plan of Care Review  Outcome: Ongoing (interventions implemented as appropriate)  Note:   Patient slept comfortably most of the night. Treated elevated glucose level per MAR. Will continue to monitor.

## 2020-08-26 NOTE — PROGRESS NOTES
Continued Stay Note   [facility]     Patient Name: Jim Montaño  MRN: [id number]  Today's Date: 8/26/2020    Admit Date: 8/24/2020    Discharge Plan     Row Name 08/26/20 0927       [facility]    Row Name 08/26/20 0809       Plan    Plan  SC Plan: return to [facility] with [facility], pending acceptance.               Expected Discharge Date and Time     Expected Discharge Date Expected Discharge Time    Aug 26, 2020             Bonnie Reynolds RN

## 2020-08-26 NOTE — DISCHARGE PLACEMENT REQUEST
"Jim Gomez (89 y.o. Male)     Date of Birth Social Security Number Address Home Phone MRN    12/18/1926  [address] [place] [address] [phone] [id number]    Faith Marital Status          Caodaism        Admission Date Admission Type Admitting Provider Attending Provider Department, Room/Bed    8/24/20 Emergency John Burleson MD Heimer, Brian T, MD [facility] 2B MEDICAL INPATIENT, 228/1    Discharge Date Discharge Disposition Discharge Destination         Home or Self Care              Attending Provider:  John Burleson MD    Allergies:  No Known Allergies    Isolation:  None   Infection:  None   Code Status:  CPR    Ht:  177.8 cm (70\")   Wt:  83.5 kg (184 lb 1.4 oz)    Admission Cmt:  None   Principal Problem:  Hyperglycemia [R73.9]                 Active Insurance as of 8/24/2020     Primary Coverage     Payor Plan Insurance Group Employer/Plan Group    MEDICARE MEDICARE A & B      Payor Plan Address Payor Plan Phone Number Payor Plan Fax Number Effective Dates    [address] [phone]  12/1/1991 - None Entered    [place] [id number]       Subscriber Name Subscriber Birth Date Member ID       JIM GOMEZ 12/18/1926 [id number]                 Emergency Contacts      (Rel.) Home Phone Work Phone Mobile Phone    SANDIP GOMEZ (Son) -- -- [phone]    JAYME CRAWFORD (Daughter) -- -- [phone]               History & Physical      John Burleson MD at 08/25/20 0716          Patient Care Team:  John Burleson MD as PCP - General  John Burleson MD as PCP - Family Medicine    Chief Conplaint  Subjective     The patient is a 89 y.o. male presents with acute mental status changes and florid elevation of blood sugar.    HPI  The patient was in his usual state of health until several days prior to presentation with the patient apparently has been having a difficult time managing his blood sugar.  Home health care evaluated the patient " on the day of presentation and found him to have some acute mental status changes with blood sugars in the 400 range.  The patient was referred to the [facility] emergency room for evaluation and was admitted    Review of Systems  Review of Systems   Constitutional: Positive for activity change.   Respiratory: Negative.    Cardiovascular: Negative.    Gastrointestinal: Negative for abdominal distention.   Neurological: Positive for speech difficulty and weakness.       History  Past Medical History:   Diagnosis Date   • A-fib (CMS/HCC)    • Cancer (CMS/HCC)     colon   • Hypertension    • Type 2 diabetes mellitus (CMS/HCC)      Past Surgical History:   Procedure Laterality Date   • PROSTATE SURGERY       Family History   Problem Relation Age of Onset   • Cancer Father         multipule mylomia      Social History     Tobacco Use   • Smoking status: Never Smoker   • Smokeless tobacco: Never Used   Substance Use Topics   • Alcohol use: Not Currently   • Drug use: Never     Allergies:  Patient has no known allergies.    Objective     Vital Signs  Temp:  [97.5 °F (36.4 °C)-98.4 °F (36.9 °C)] 97.7 °F (36.5 °C)  Heart Rate:  [72-89] 72  Resp:  [12-18] 18  BP: ()/(36-83) 103/59      Physical Exam:   Physical Exam   Constitutional: He appears well-developed and well-nourished.   HENT:   Head: Normocephalic and atraumatic.   Eyes: Pupils are equal, round, and reactive to light. EOM are normal.   Cardiovascular: Regular rhythm.   Pulmonary/Chest: Effort normal.   Abdominal: Soft.   Musculoskeletal: Normal range of motion.   Neurological: He is alert.   Vitals reviewed.       Results Review:   CBC    Results from last 7 days   Lab Units 08/25/20  0356 08/24/20  1458   WBC 10*3/mm3 10.70 10.30   HEMOGLOBIN g/dL 14.0 13.7   PLATELETS 10*3/mm3 227 236     BMP   Results from last 7 days   Lab Units 08/25/20  0356 08/24/20  1458   SODIUM mmol/L 136 132*   POTASSIUM mmol/L 2.9* 3.0*   CHLORIDE mmol/L 96* 91*   CO2  mmol/L 29.0 29.0   BUN  57* 58*   CREATININE mg/dL 1.91* 2.05*   GLUCOSE mg/dL 71 512*   MAGNESIUM mg/dL  --  2.1   PHOSPHORUS mg/dL  --  3.3     Cr Clearance Estimated Creatinine Clearance: 28.2 mL/min (A) (by C-G formula based on SCr of 1.91 mg/dL (H)).  Coag   Results from last 7 days   Lab Units 08/24/20  1458   INR  1.04   APTT seconds 28.6     HbA1C No results found for: HGBA1C  Blood Glucose   Glucose   Date/Time Value Ref Range Status   08/25/2020 0503 71 70 - 105 mg/dL Final     Comment:     Serial Number: [id number]:  [id number]   08/24/2020 2351 170 (H) 70 - 105 mg/dL Final     Comment:     Serial Number: [id number]:  [id number]   08/24/2020 2033 411 (C) 70 - 105 mg/dL Final     Comment:     Serial Number: [id number]:  [id number]     Infection     CMP   Results from last 7 days   Lab Units 08/25/20  0356 08/24/20  1458   SODIUM mmol/L 136 132*   POTASSIUM mmol/L 2.9* 3.0*   CHLORIDE mmol/L 96* 91*   CO2 mmol/L 29.0 29.0   BUN  57* 58*   CREATININE mg/dL 1.91* 2.05*   GLUCOSE mg/dL 71 512*   ALBUMIN g/dL  --  3.30*   BILIRUBIN mg/dL  --  0.5   ALK PHOS U/L  --  71   AST (SGOT) U/L  --  17   ALT (SGPT) U/L  --  17     UA    Results from last 7 days   Lab Units 08/24/20  1629   NITRITE UA  Negative   WBC UA /HPF None Seen   BACTERIA UA /HPF None Seen   SQUAM EPITHEL UA /HPF None Seen     Radiology(recent) Ct Head Without Contrast    Result Date: 8/24/2020   1. Volume loss secondary to age-appropriate atrophy. 2. Chronic ischemic changes. 3. No acute findings.  Electronically Signed By-John Coy On:8/24/2020 4:17 PM This report was finalized on [id number] by  John Coy, .       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  History of colon cancer  History of right hemicolectomy  Secondary thrombophilia  Atrial fibrillation, chronic  Gastroesophageal reflux disease without gastritis  Degenerative disc disease lumbosacral spine  Degenerative joint disease  Vitamin B12 deficiency  Chronic insulin use  BPH with LUTS  Chronic pancreatitis  Normocytic normochromic anemia  Coronary artery calcifications  LVH  Pulmonary hypertension  Hypertension associated with chronic kidney disease stage III  Atherosclerotic heart disease of native coronary arteries with angina pectoris  Hypokalemia      Plan:  Gentle parenteral fluids//glycemic control//will need endocrine follow-up//physical therapy if necessary//electrolyte maintenance  Expected Length of Stay 1-2 days    I discussed the patients findings and my recommendations with patient and nursing staff.     John Burleson MD  08/25/20  07:17          Electronically signed by John Burleson MD at 08/25/20 0728         Current Facility-Administered Medications   Medication Dose Route Frequency Provider Last Rate Last Dose   • acetaminophen (TYLENOL) tablet 650 mg  650 mg Oral Q4H PRN Yuliana Freeman DO        Or   • acetaminophen (TYLENOL) 160 MG/5ML solution 650 mg  650 mg Oral Q4H PRN Yuliana Freeman DO        Or   • acetaminophen (TYLENOL) suppository 650 mg  650 mg Rectal Q4H PRN Yuliana Freeman DO       • aspirin EC tablet 81 mg  81 mg Oral Daily Yuliana Freeman DO   81 mg at 08/25/20 0828   • atorvastatin (LIPITOR) tablet 10 mg  10 mg Oral Daily Yuliana Freeman DO   10 mg at 08/25/20 0832   • bisacodyl (DULCOLAX) suppository 10 mg  10 mg Rectal Daily PRN Yuliana Freeman DO       • bisoprolol (ZEBeta) tablet 5 mg  5 mg Oral Daily Yuliana Freeman DO   5 mg at 08/25/20 0828   • calcium carbonate (TUMS) chewable tablet 500 mg (200 mg elemental)  2 tablet Oral BID PRN  Yuliana Freeman DO       • dextrose (D50W) 25 g/ 50mL Intravenous Solution 25 g  25 g Intravenous Q15 Min PRN Yuliana Freeman DO       • dextrose (GLUTOSE) oral gel 15 g  15 g Oral Q15 Min PRN Yuliana Freeman DO       • escitalopram (LEXAPRO) tablet 10 mg  10 mg Oral Daily Yuliana Freeman DO   10 mg at 08/25/20 0828   • finasteride (PROSCAR) tablet 5 mg  5 mg Oral Daily Yuliana Freeman DO   5 mg at 08/25/20 0828   • glucagon (human recombinant) (GLUCAGEN DIAGNOSTIC) injection 1 mg  1 mg Subcutaneous Q15 Min PRN Yuliana Freeman DO       • heparin (porcine) 5000 UNIT/ML injection 5,000 Units  5,000 Units Subcutaneous Q12H Yuliana Freeman DO   5,000 Units at 08/25/20 2057   • HYDROcodone-acetaminophen (NORCO) 5-325 MG per tablet 1 tablet  1 tablet Oral Q4H PRN Yuliana Freeman DO       • insulin glargine (LANTUS) injection 50 Units  50 Units Subcutaneous Nightly Colton Ramos MD       • insulin lispro (humaLOG) injection 0-24 Units  0-24 Units Subcutaneous TID AC Yuliana Freeman DO   8 Units at 08/25/20 1726    And   • insulin lispro (humaLOG) injection 0-24 Units  0-24 Units Subcutaneous PRN Yuliana Freeman DO   4 Units at 08/26/20 0306   • insulin lispro (humaLOG) injection 5 Units  5 Units Subcutaneous TID With Meals Colton Ramos MD   5 Units at 08/25/20 1726   • levETIRAcetam XR (KEPPRA XR) 24 hr tablet 1,000 mg  1,000 mg Oral Daily Yuliana Freeman DO   1,000 mg at 08/25/20 0832   • magnesium hydroxide (MILK OF MAGNESIA) suspension 2400 mg/10mL 10 mL  10 mL Oral Daily PRN Yuliana Freeman DO       • ondansetron (ZOFRAN) tablet 4 mg  4 mg Oral Q6H PRN Yuliana Freeman DO        Or   • ondansetron (ZOFRAN) injection 4 mg  4 mg Intravenous Q6H PRN Yuliana Freeman DO       • potassium chloride (K-DUR,KLOR-CON) CR tablet 40 mEq  40 mEq Oral PRN John Burleson MD   40 mEq at 08/25/20 2058    Or   • potassium chloride (KLOR-CON) packet 40 mEq  40 mEq Oral PRN John Burleson MD        Or   • potassium chloride  10 mEq in 100 mL IVPB  10 mEq Intravenous Q1H PRN Shanthi Oseguera MD       • potassium chloride 10 mEq in 100 mL IVPB  10 mEq Intravenous Q1H PRN Yuliana Freeman A, DO       • sodium chloride 0.9 % flush 10 mL  10 mL Intravenous PRN Mckenna Freemann A, DO   10 mL at 08/24/20 1638   • sodium chloride 0.9 % flush 10 mL  10 mL Intravenous Q12H Mckenna Freemann A, DO   10 mL at 08/25/20 2058   • sodium chloride 0.9 % flush 10 mL  10 mL Intravenous PRN Freeman, Yuliana A, DO       • sodium chloride 0.9 % infusion  100 mL/hr Intravenous Continuous Yuliana Freeman A,  mL/hr at 08/25/20 2059 100 mL/hr at 08/25/20 2059     Referral Orders (last 24 hours) (24h ago, onward)     Start     Ordered    08/26/20 0000  Ambulatory Referral to Home Health     Question Answer Comment   Face to Face Visit Date: 8/26/2020    Follow-up provider for Plan of Care? I will be treating the patient on an ongoing basis.  Please send me the Plan of Care for signature.    Follow-up provider: SHANTHI OSEGUERA    Reason/Clinical Findings Hyperglycemia, Diabetes type II    Describe mobility limitations that make leaving home difficult: weakness    Nursing/Therapeutic Services Requested Skilled Nursing HHC to eval and treat   Skilled nursing orders: Other HHC to eval and treat   Frequency: 1 Week 1        08/26/20 0801

## 2020-08-27 NOTE — PROGRESS NOTES
Case Management Discharge Note      Final Note: [facility] - notified of DC.               Home Medical Care - Selection Complete      Service Provider Request Status Selected Services Address Phone Number Fax Number    [facility] Selected Home Health Services [address] [place] [address] [phone]       Bonnie Reynolds RN 8/26/2020 0805    DC today.                              Final Discharge Disposition Code: 06 - home with home health care

## 2020-08-27 NOTE — OUTREACH NOTE
Prep Survey      Responses   [facility] facility patient discharged from?  [facility]   Is LACE score < 7 ?  No   Eligibility  Readm Mgmt   Discharge diagnosis  Hyperglycemia with acute mental status changes, renal insufficiency with MAVERICK   COVID-19 Test Status  Not tested   Does the patient have one of the following disease processes/diagnoses(primary or secondary)?  Other   Does the patient have Home health ordered?  Yes   What is the Home health agency?   [facility] [place]   Is there a DME ordered?  No   Comments regarding appointments  Per AVS   Medication alerts for this patient  low dose aspirin   Prep survey completed?  Yes          Eulalia Winn RN

## 2020-08-31 NOTE — OUTREACH NOTE
Medical Week 1 Survey      Responses   [facility] patient discharged from?  [facility]   COVID-19 Test Status  Not tested   Does the patient have one of the following disease processes/diagnoses(primary or secondary)?  Other   Is there a successful TCM telephone encounter documented?  No   Week 1 attempt successful?  Yes   Call start time  1151   Call end time  1155   Discharge diagnosis  Hyperglycemia with acute mental status changes, renal insufficiency with MAVERICK   Meds reviewed with patient/caregiver?  Yes   Is the patient having any side effects they believe may be caused by any medication additions or changes?  No   Does the patient have all medications ordered at discharge?  Yes   Is the patient taking all medications as directed (includes completed medication regime)?  Yes   Comments regarding appointments  Per AVS   Does the patient have a primary care provider?   Yes   Does the patient have an appointment with their PCP within 7 days of discharge?  Yes   Has the patient kept scheduled appointments due by today?  N/A   What is the Home health agency?   [facility]   Has home health visited the patient within 72 hours of discharge?  Yes   Pulse Ox monitoring  Intermittent   Pulse Ox device source  Other   O2 Sat comments  97%   Did the patient receive a copy of their discharge instructions?  Yes   Nursing interventions  Reviewed instructions with patient   What is the patient's perception of their health status since discharge?  Improving   Is the patient/caregiver able to teach back signs and symptoms related to disease process for when to call PCP?  Yes   Is the patient/caregiver able to teach back signs and symptoms related to disease process for when to call 911?  Yes   Is the patient/caregiver able to teach back the hierarchy of who to call/visit for symptoms/problems? PCP, Specialist, Home health nurse, Urgent Care, ED, 911  Yes   Week 1 call completed?  Yes   Wrap up additional  comments  States his daughter takes care of his meds and appts.  He has not gotten Lantus, he thinks there is an issue with the insurance, possibly a prior aurthorization needed but is not sure.  Will route to .           Faye Talbert RN

## 2020-09-07 NOTE — OUTREACH NOTE
Medical Week 2 Survey      Responses   [facility] patient discharged from?  [facility]   COVID-19 Test Status  Not tested   Does the patient have one of the following disease processes/diagnoses(primary or secondary)?  Other   Week 2 attempt successful?  Yes   Call start time  1717   Discharge diagnosis  Hyperglycemia with acute mental status changes, renal insufficiency with MAVERICK   Call end time  1723   Meds reviewed with patient/caregiver?  Yes   Is the patient having any side effects they believe may be caused by any medication additions or changes?  No   Does the patient have all medications ordered at discharge?  Yes   Is the patient taking all medications as directed (includes completed medication regime)?  Yes   Does the patient have a primary care provider?   Yes   Does the patient have an appointment with their PCP within 7 days of discharge?  No   What is preventing the patient from scheduling follow up appointments within 7 days of discharge?  Haven't had time   Has the patient kept scheduled appointments due by today?  N/A   What is the Home health agency?   [facility]   Has home health visited the patient within 72 hours of discharge?  Yes   Pulse Ox monitoring  Intermittent   Psychosocial issues?  No   Did the patient receive a copy of their discharge instructions?  Yes   Nursing interventions  Reviewed instructions with patient   What is the patient's perception of their health status since discharge?  Improving   Is the patient/caregiver able to teach back signs and symptoms related to disease process for when to call PCP?  Yes   Is the patient/caregiver able to teach back signs and symptoms related to disease process for when to call 911?  Yes   Is the patient/caregiver able to teach back the hierarchy of who to call/visit for symptoms/problems? PCP, Specialist, Home health nurse, Urgent Care, ED, 911  Yes   Additional teach back comments  pt states glucoses still higher than he  would like, plans to discuss with MD at next appt that will be made on 9/8, when offices open   Week 2 Call Completed?  Yes          Debbie Donovan RN

## 2020-09-15 NOTE — OUTREACH NOTE
Medical Week 3 Survey      Responses   [facility] patient discharged from?  [facility]   COVID-19 Test Status  Not tested   Does the patient have one of the following disease processes/diagnoses(primary or secondary)?  Other   Week 3 attempt successful?  No   Unsuccessful attempts  Attempt 1          Amanda Hampton RN

## 2020-09-18 NOTE — OUTREACH NOTE
"Medical Week 3 Survey      Responses   [facility] patient discharged from?  [facility]   COVID-19 Test Status  Not tested   Does the patient have one of the following disease processes/diagnoses(primary or secondary)?  Other   Week 3 attempt successful?  Yes   Call start time  1105   Call end time  1108   Meds reviewed with patient/caregiver?  Yes   Is the patient having any side effects they believe may be caused by any medication additions or changes?  No   Does the patient have all medications ordered at discharge?  Yes   Is the patient taking all medications as directed (includes completed medication regime)?  Yes   Does the patient have a primary care provider?   Yes   Comments regarding PCP  PATIENT HAS NOT YET MADE HIS FOLLOW UP APPOINTMENT WITH DR. OSEGUERA, BUT STATES, \"I'LL INQUIRE ABOUT THAT TODAY.\"   Does the patient have an appointment with their PCP within 7 days of discharge?  No   What is preventing the patient from scheduling follow up appointments within 7 days of discharge?  Haven't had time   Nursing Interventions  Educated patient on importance of making appointment, Advised patient to make appointment   Has the patient kept scheduled appointments due by today?  N/A   What is the Home health agency?   [facility]   Has home health visited the patient within 72 hours of discharge?  Yes   Pulse Ox monitoring  None   Did the patient receive a copy of their discharge instructions?  Yes   Nursing interventions  Reviewed instructions with patient   What is the patient's perception of their health status since discharge?  Improving   Is the patient/caregiver able to teach back signs and symptoms related to disease process for when to call PCP?  Yes   Is the patient/caregiver able to teach back signs and symptoms related to disease process for when to call 911?  Yes   Is the patient/caregiver able to teach back the hierarchy of who to call/visit for symptoms/problems? PCP, Specialist, " Home health nurse, Urgent Care, ED, 911  Yes   Week 3 Call Completed?  Yes          Olga Lidia Story LPN

## 2020-10-18 NOTE — ED PROVIDER NOTES
Subjective   Chief complaint: Right-sided facial weakness      Context: Patient is a 89-year-old male who comes in ambulatory by private vehicle with complaints of right-sided facial weakness for the last 2 to 3 days-he states it was an immediate onset and has not been progressive.  He states he saw his eye doctor 2 days ago because his eyelid would not close but denies any visual changes; and he had facial drooping at that time .  He states he has had some drooping on the right side of his mouth.  He denies any extremity weakness falls visual changes hearing changes rash confusion ataxia aphasia or dysarthria.  No prior history of CVA.  Has been ambulatory as normal with his walker.  He denies any headache or thunderclap type noise.  He denies any fever or stiff neck.  He states his blood sugar normally runs between 100-200 and he was 198 this morning; he states he has eaten today but missed his mid day insulin and night insuslin.  He denies any fever nausea vomiting diarrhea chest pain back pain shortness of breath polyuria polydipsia or visual changes.  He denies any urinary complaints.  He states he has been able to eat and drink without choking or coughing on his food or drinks.  Patient was ambulatory without ataxia    Duration: 2-3 days    Timing: Constant    Severity: none    Associated symptoms: denies          PCP:  [facility]          Review of Systems   Constitutional: Negative for fever.   HENT: Negative for drooling, sore throat, tinnitus, trouble swallowing and voice change.         Right side facial droop     Eyes: Negative for photophobia, pain, discharge, itching and visual disturbance.        Eye drooping   Respiratory: Negative.    Cardiovascular: Negative.    Gastrointestinal: Negative.    Endocrine: Negative for polydipsia and polyuria.   Genitourinary: Negative.    Musculoskeletal: Negative.         Chronically uses a walker but states otherwise his gait has not changed   Skin: Negative.     Allergic/Immunologic: Negative for immunocompromised state.   Neurological: Positive for tremors and facial asymmetry. Negative for dizziness, seizures, syncope, weakness, light-headedness, numbness and headaches.        Baseline right hand tremor     Hematological: Does not bruise/bleed easily.   Psychiatric/Behavioral: Negative for confusion.       Past Medical History:   Diagnosis Date   • A-fib (CMS/HCC)    • Cancer (CMS/HCC)     colon   • Hypertension    • Type 2 diabetes mellitus (CMS/HCC)        No Known Allergies    Past Surgical History:   Procedure Laterality Date   • PROSTATE SURGERY         Family History   Problem Relation Age of Onset   • Cancer Father         multipule mylomia        Social History     Socioeconomic History   • Marital status:      Spouse name: Not on file   • Number of children: Not on file   • Years of education: Not on file   • Highest education level: Not on file   Tobacco Use   • Smoking status: Never Smoker   • Smokeless tobacco: Never Used   Substance and Sexual Activity   • Alcohol use: Not Currently   • Drug use: Never           Objective   Physical Exam     Vital signs in triage nurse note reviewed.  Constitutional: Awake, alert, nontoxic in appearance. No acute distress is noted.  HEENT: Normocephalic, atraumatic; pupils are PERRL with intact EOM; oropharynx is pink and moist without exudate or erythema.  Patient has right-sided facial weakness.  Right eye will not close.  Right eyebrow was noted to be unable to raise and flattened, and there is a disappearance of the nasolabial fold.  Drooping noted to the right side of the mouth.  Specifically no rash.  TMs intact bilaterally without erythema bulging bleeding discharge or rash.  Neck: Supple, full range of motion without pain;   Cardiovascular: Regularly irregular rate and rhythm-patient has a history of A. fib.  Pulmonary: Respiratory effort regular, nonlabored; breath sounds clear to auscultation all  fields.  Abdomen: Soft, nontender, nondistended with normoactive bowel sounds; no rebound or guarding.  Musculoskeletal: Independent range of motion of all extremities, no palpable tenderness or edema.  Tremor noted to the right upper extremity which she states is normal for him.  Spine is midline without obvious curvature scoliosis. No bony tenderness, soft tissue swelling, deformity is noted. Paraspinal musculature is soft, nontender.  Neuro: Alert oriented x3, speech is clear and appropriate.  Normal shoulder shrug,  no peripheral vision loss,  no pronator drift, normal coordination.  Patient was able to get up and ambulate and states he is at his gait baseline. Negative romberg    Procedures           ED Course  ED Course as of Oct 17 2224   Sat Oct 17, 2020   2138 Dtr called concerned about patients glucose. Discussed with patient and he ok'd me to speak with her.  Patient requesting to go home and take his own insulin and not be admitted. I felt this was a reasonable course of action as he has no n/v/d/f/infectious symptoms. He states he does have adequate supply of insulin at home.     [JW]   2216 Patient's son-in-law presented to the ER to  the patient and states he was concerned about patient's glucose.  I discussed with the patient who is agreeable to me discussing his care with the son-in-law.  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  We extensively discussed the difference between Bell's palsy versus stroke.  We discussed he is also not had his insulin this evening and patient wanted to go home and take it when he got home to [facility].  Son-in-law was requesting admission overnight to lower the glucose.  Patient continues to deny any chest pain shortness of breath nausea vomiting polyuria polydipsia.  His neurological status has unchanged.  He was offered observation admission for insulin and glucose control which he declined stating he wants to go home.    "patient is alert and oriented and he is able to make his own decisions and wanted to go home and take his own insulin as we had previously discussed and did NOT want to be admitted. Son in law states is concerned because he is \"hard headed and wasn't sure if he would take it.\"    [JW]      ED Course User Index  [JW] John Lorna MIGUEL, APRN                                           MDM  Number of Diagnoses or Management Options  Bell's palsy:   Diagnosis management comments:   Comorbidities:  has a past medical history of A-fib (CMS/[facility]), Cancer (CMS/[facility]), Hypertension, and Type 2 diabetes mellitus (CMS/[facility]).  Differentials: Bell's palsy-CVA felt unlikely based on HPI and physical exam not all inclusive of differentials considered  Radiology interpretation: Agrees not warranted  Laboratory interpretation: Elevated glucose-patient states he has not taken his insulin this evening but did take his Humalog this morning.    Appropriate PPE worn during exam.  I discussed findings with patient.  We discussed diagnosis of Bell's palsy Versus stroke-patient is agreeable to plan of care and verbalizes understanding.  We discussed the importance of taking his Lantus this evening when he gets home and close follow-up over the next couple days as well as dietary indications.  We discussed signs and symptoms of when to return emergently to the ER.  There is no obvious rash to indicate [place].  As his symptoms have started within the last 48 to 72 hours he will be started on prednisone and valacyclovir.  Decision was made to do lower dose prednisone due to the risk of psychosis and history of diabetes.  His eye was patched shut and was notified to do rewetting drops and eye care.   He was able to get up and dress himself without any difficulty.  Discussed with Dr. Escobedo who agrees with plan of care and discharge.    i discussed findings with patient who voices understanding of discharge instructions, signs and symptoms " requiring return to ED; discharged improved and in stable condition with follow up for re-evaluation.         Amount and/or Complexity of Data Reviewed  Clinical lab tests: reviewed        Final diagnoses:   Bell's palsy            Lorna Lopez, MAREN  10/17/20 2039       Lorna Lopez, MAREN  10/17/20 2152       Lorna Lopez, MAREN  10/17/20 2155       Lorna Lopez, APRN  10/17/20 2224

## 2020-10-18 NOTE — DISCHARGE INSTRUCTIONS
Frequent rewetting drops, keep eye taped or patched closed.   Follow up with dr. Burleson this week.  Medications as directed. Return for any new or worsening syptoms

## 2020-11-16 NOTE — PROGRESS NOTES
Endocrine Progress Note Outpatient     Patient Care Team:  John Burleson MD as PCP - General  John Burleson MD as PCP - Family Medicine    Chief Complaint: Follow-up uncontrolled type 2 diabetes    HPI: 89-year-old male with history of type 2 diabetes, hypertension, hyperlipidemia, A. fib, TIA is here for follow-up.        For type 2 diabetes he is currently taking Tresiba 50 units once a day along with Novolog 10 units sq ac meals. No low BS. Did not bring BS records for review.   Hypertension: Well-controlled  Hyperlipidemia: On pravastatin.    Past Medical History:   Diagnosis Date   • A-fib (CMS/HCC)    • Cancer (CMS/HCC)     colon   • Hypertension    • Type 2 diabetes mellitus (CMS/HCC)        Social History     Socioeconomic History   • Marital status:      Spouse name: Not on file   • Number of children: Not on file   • Years of education: Not on file   • Highest education level: Not on file   Tobacco Use   • Smoking status: Never Smoker   • Smokeless tobacco: Never Used   Substance and Sexual Activity   • Alcohol use: Not Currently   • Drug use: Never       Family History   Problem Relation Age of Onset   • Cancer Father         multipule mylomia        No Known Allergies    ROS:   Constitutional:  Admit fatigue, tiredness.    Eyes:  Denies change in visual acuity   HENT:  Denies nasal congestion or sore throat   Respiratory: denies cough, shortness of breath.   Cardiovascular:  denies chest pain, edema   GI:  Denies abdominal pain, nausea, vomiting.   Musculoskeletal:  Denies back pain or joint pain   Integument:  Denies dry skin and rash   Neurologic:  Denies headache, focal weakness or sensory changes   Endocrine:  Denies polyuria or polydipsia   Psychiatric:  Denies depression or anxiety      Vitals:    11/16/20 1433   BP: 122/70   Pulse: 76   Temp: 97.3 °F (36.3 °C)   SpO2: 96%       Physical Exam:  GEN: NAD, conversant  EYES: EOMI, PERRL, no conjunctival erythema  NECK: no thyromegaly,  full ROM   CV: RRR, no murmurs/rubs/gallops, no peripheral edema  LUNG: CTAB, no wheezes/rales/ronchi  SKIN: no rashes, no acanthosis  MSK: no deformities, full ROM of all extremities  NEURO: no tremors, DTR normal  PSYCH: AOX3, appropriate mood, affect normal      Results Review:     I reviewed the patient's new clinical results.      Lab Results   Component Value Date    GLUCOSE 208 (H) 08/26/2020    BUN  08/26/2020      Comment:      Testing performed by alternate method    BUN 45 (H) 08/26/2020    CREATININE 1.72 (H) 08/26/2020    EGFRIFNONA 37 (L) 08/26/2020    BCR  08/26/2020      Comment:      Testing not performed    K 3.4 (L) 08/26/2020    CO2 27.0 08/26/2020    CALCIUM 8.0 (L) 08/26/2020    ALBUMIN 3.20 (L) 08/26/2020    LABIL2 0.8 (L) 04/28/2019    AST 17 08/24/2020    ALT 17 08/24/2020    CHOL 111 07/15/2018    TRIG 59 07/15/2018    LDL 59 07/15/2018    HDL 32 (L) 07/15/2018     Lab Results   Component Value Date    TSH 1.140 08/24/2020     Labs 8/2020: A1c 11.9%    Medication Review: Reviewed.       Current Outpatient Medications:   •  aspirin (Aspir-Low) 81 MG EC tablet, Take 81 mg by mouth Daily., Disp: , Rfl:   •  bisoprolol (ZEBeta) 5 MG tablet, Take 5 mg by mouth Daily., Disp: , Rfl:   •  Cholecalciferol (vitamin D3) 125 MCG (5000 UT) capsule capsule, Take 5,000 Units by mouth Daily., Disp: , Rfl:   •  escitalopram (LEXAPRO) 10 MG tablet, Take 10 mg by mouth Daily., Disp: , Rfl:   •  finasteride (PROSCAR) 5 MG tablet, Take 5 mg by mouth Daily., Disp: , Rfl:   •  furosemide (LASIX) 20 MG tablet, Take 40 mg by mouth Daily., Disp: , Rfl:   •  insulin glargine (LANTUS) 100 UNIT/ML injection, Inject 50 Units under the skin into the appropriate area as directed Every Night., Disp: 3 mL, Rfl: 12  •  insulin lispro (humaLOG) 100 UNIT/ML injection, Inject 5 Units under the skin into the appropriate area as directed 3 (Three) Times a Day With Meals., Disp: 3 mL, Rfl: 12  •  levETIRAcetam XR (KEPPRA XR) 500  MG 24 hr tablet, Take 2,000 mg by mouth Daily., Disp: , Rfl:   •  potassium chloride (K-DUR,KLOR-CON) 20 MEQ CR tablet, Take 40 mEq by mouth Daily., Disp: , Rfl:   •  pravastatin (PRAVACHOL) 40 MG tablet, Take 40 mg by mouth Daily., Disp: , Rfl:       Assessment/Plan   1.  Diabetes mellitus type 2: Uncontrolled, at this time I will continue Tresiba 50 units subcu daily and increase Humalog/NovoLog to 15 units with each meal and he is advised to watch his blood sugars, always carry glucose source with him in case of low blood sugar and send blood sugar records for review next few weeks.  Also advised to get regular eye exam and flu vaccine.  2.  Hypertension: Well-controlled  3.  Hyperlipidemia: On pravastatin.            Meena Corona MD FACE.

## 2020-11-16 NOTE — PATIENT INSTRUCTIONS
Change Humalog to 15 units with each meal  Continue Tresiba 50 units at night  Always give glucose dose in case of low blood sugar  Labs before follow-up  Annual eye exam and flu vaccine  Continue to work on your diet and activity the best you can.

## 2020-11-24 PROBLEM — K92.2 GASTROINTESTINAL HEMORRHAGE: Status: ACTIVE | Noted: 2020-01-01

## 2020-11-25 PROBLEM — K92.2 GASTROINTESTINAL HEMORRHAGE: Status: RESOLVED | Noted: 2020-01-01 | Resolved: 2020-01-01

## 2020-11-26 NOTE — OUTREACH NOTE
Prep Survey      Responses   [facility]?  [facility]   Is LACE score < 7 ?  No   Eligibility  Readm Mgmt   Discharge diagnosis  Rectal Bleededg   Does the patient have one of the following disease processes/diagnoses(primary or secondary)?  Other   Does the patient have Home health ordered?  Yes   What is the Home health agency?   [facility]     Is there a DME ordered?  No   Prep survey completed?  Yes          Maggy Browning RN

## 2020-12-01 NOTE — OUTREACH NOTE
Medical Week 1 Survey      Responses   [facility] patient discharged from?  [facility]   Does the patient have one of the following disease processes/diagnoses(primary or secondary)?  Other   Week 1 attempt successful?  Yes   Call start time  1016   Call end time  1020   Meds reviewed with patient/caregiver?  Yes   Does the patient have all medications ordered at discharge?  N/A   Is the patient taking all medications as directed (includes completed medication regime)?  Yes   Does the patient have a primary care provider?   Yes   Does the patient have an appointment with their PCP within 7 days of discharge?  Greater than 7 days   Comments regarding PCP  PATIENT STATES HIS FOLLOW UP APPOINTMENT WITH DR. OSEGUERA IS 12/10   What is preventing the patient from scheduling follow up appointments within 7 days of discharge?  Earlier appointment not available   Nursing Interventions  Verified appointment date/time/provider   Has the patient kept scheduled appointments due by today?  N/A   What is the Home health agency?   [facility]     Has home health visited the patient within 72 hours of discharge?  Yes   Did the patient receive a copy of their discharge instructions?  Yes   Nursing interventions  Reviewed instructions with patient   What is the patient's perception of their health status since discharge?  Improving   Is the patient/caregiver able to teach back signs and symptoms related to disease process for when to call PCP?  Yes   Is the patient/caregiver able to teach back signs and symptoms related to disease process for when to call 911?  Yes   Is the patient/caregiver able to teach back the hierarchy of who to call/visit for symptoms/problems? PCP, Specialist, Home health nurse, Urgent Care, ED, 911  Yes   Week 1 call completed?  Yes          Olga Lidia Story LPN

## 2020-12-07 NOTE — PROGRESS NOTES
Enter Query Response Below      Query Response:     Pt has Chronic systolic heart failure  Pt has Hx of seizures  MAVERICK diagnosis was not supported         If applicable, please update the problem list.     Patient: Jim Montaño        : 1926  Account: [id number]           Admit Date: 2020        Options to Respond to Query:    1. Access the Encounter     a. From the To-Do Side bar, click Respond With Note.     b. Click New Note     c. Answer query within the yellow box.                d. Update the Problem List if applicable.     Dr. Alonso    Risk Factors: 89 year old male with history of aifb, HTN and type 2 diabetes mellitus    Clinical Indicators: Admitted  with GI bleed.  GI consult notes MAVERICK.   Creatinine- 1.52 (751), 1.38 (239)  eGFR- 43 ( 075), 48 (239)    Treatments:    After study, was acute kidney injury clinically supported during this admission?  acute kidney injury was supported with additional clinical indicators:____________  acute kidney injury was not supported, CKD stage 3  other- specify_____________  unable to determine      MAVERICK is defined by KDIGO as any of the following:   Increase in SCr by 0.3 mg/dl within 48 hours; or    Increase in SCr to 1.5 times baseline, which is known or presumed to have occurred within the prior 7 days; or   Urine volume <0.5 ml/kg/h for 6 hours.  Reference article: http://www.kdigo.org/clinical_practice_guidelines/pdf/KDIGO%20AKI%20Guideline.pdf (as published in Kidney International Supplements, The Official Journal of the International Society of Nephrology, vol. 2, issue 1, 2012.)    KDIGO CKD staging www.kdigo.org  CKD Stage 1  > 90  CKD Stage 2  60-89  CKD Stage 3a  45-59  CKD Stage 3b  30-44  CKD Stage 4    15-29  CKD Stage 5     <15    By submitting this query, we are merely seeking further clarification of documentation to accurately reflect all conditions that you are monitoring, evaluating,  treating or that extend the hospitalization or utilize additional resources of care. Please utilize your independent clinical judgment when addressing the question(s) above.     This query and your response, once completed, will be entered into the legal medical record.    Sincerely,  Lilliam MCCLENDON, RN, CCDS  [email or web address]  Clinical Documentation Integrity Program

## 2020-12-09 NOTE — OUTREACH NOTE
Medical Week 2 Survey      Responses   [facility] patient discharged from?  [facility]   Does the patient have one of the following disease processes/diagnoses(primary or secondary)?  Other   Week 2 attempt successful?  Yes   Call start time  1735   Discharge diagnosis  Rectal Bleededg   Call end time  1737   Meds reviewed with patient/caregiver?  Yes   Does the patient have all medications ordered at discharge?  N/A   Is the patient taking all medications as directed (includes completed medication regime)?  Yes   Does the patient have a primary care provider?   Yes   Has the patient kept scheduled appointments due by today?  N/A   Comments  Has appt with Dr. Burleson tomorrow   What is the Home health agency?   [facility]     Has home health visited the patient within 72 hours of discharge?  Yes   Psychosocial issues?  No   Did the patient receive a copy of their discharge instructions?  Yes   Nursing interventions  Reviewed instructions with patient   What is the patient's perception of their health status since discharge?  Improving   Is the patient/caregiver able to teach back signs and symptoms related to disease process for when to call PCP?  Yes   Is the patient/caregiver able to teach back signs and symptoms related to disease process for when to call 911?  Yes   Is the patient/caregiver able to teach back the hierarchy of who to call/visit for symptoms/problems? PCP, Specialist, Home health nurse, Urgent Care, ED, 911  Yes   Additional teach back comments  States he is doing well.  He is using the walker to ambulate. Home health was there today.  States he had a question but could not think of it right now.  Explained he can always call the Nurse Triage.  States he has the number.   Week 2 Call Completed?  Yes   Graduated  Yes   Is the patient interested in additional calls from an ambulatory ?  NOTE:  applies to high risk patients requiring additional follow-up.  No   Did the patient feel the  follow up calls were helpful during their recovery period?  Yes   Was the number of calls appropriate?  Yes   Graduated/Revoked comments  No needs at this time          Denise Owusu LPN

## 2020-12-10 NOTE — ED PROVIDER NOTES
Subjective   89-year-old male presents with complaint of elevated blood sugar at home.  He reports he was sent by the home health nurse.  He denies any complaints otherwise.  He denies recent illness chest pain dyspnea.  Denies fever sweats chills cough.  Denies nausea vomiting diarrhea melena hematochezia or abdominal pain.  Denies headache focal deficits.  He does report decreased appetite.    1. Location: Denies  2. Quality: None  3. Severity: 0  4. Worsening factors: None  5. Alleviating factors: None  6. Onset: This morning  7. Radiation: None  8. Frequency: none  9. Co-morbidities: Past Medical History:  No date: A-fib (CMS/HCC)  No date: Cancer (CMS/HCC)      Comment:  colon  No date: Hypertension  No date: Type 2 diabetes mellitus (CMS/HCC)  10. Source: patient            Review of Systems   Constitutional: Positive for appetite change. Negative for chills, diaphoresis and fever.   HENT: Negative for ear discharge and rhinorrhea.    Eyes: Negative for photophobia and visual disturbance.   Respiratory: Negative for shortness of breath.    Cardiovascular: Negative for chest pain.   Gastrointestinal: Negative for abdominal pain, blood in stool, diarrhea, nausea and vomiting.   Musculoskeletal: Negative for neck pain.   Skin: Negative for pallor and rash.   Neurological: Negative for dizziness, weakness and headaches.   Psychiatric/Behavioral: Negative for confusion.   All other systems reviewed and are negative.      Past Medical History:   Diagnosis Date   • A-fib (CMS/HCC)    • Cancer (CMS/HCC)     colon   • Hypertension    • Type 2 diabetes mellitus (CMS/HCC)        No Known Allergies    Past Surgical History:   Procedure Laterality Date   • PROSTATE SURGERY         Family History   Problem Relation Age of Onset   • Cancer Father         multipule mylomia        Social History     Socioeconomic History   • Marital status:      Spouse name: Not on file   • Number of children: Not on file   • Years of  education: Not on file   • Highest education level: Not on file   Tobacco Use   • Smoking status: Never Smoker   • Smokeless tobacco: Never Used   Substance and Sexual Activity   • Alcohol use: Not Currently   • Drug use: Never           Objective   Physical Exam  Vitals signs and nursing note reviewed.   Constitutional:       General: He is awake. He is not in acute distress.     Appearance: Normal appearance. He is well-developed and normal weight. He is not ill-appearing or toxic-appearing.   HENT:      Head: Normocephalic and atraumatic.      Right Ear: External ear normal.      Left Ear: External ear normal.      Nose: Nose normal. No rhinorrhea.      Mouth/Throat:      Mouth: Mucous membranes are moist.      Pharynx: Oropharynx is clear.   Eyes:      General: No scleral icterus.     Extraocular Movements: Extraocular movements intact.      Conjunctiva/sclera: Conjunctivae normal.      Pupils: Pupils are equal, round, and reactive to light.   Neck:      Musculoskeletal: Normal range of motion and neck supple.   Cardiovascular:      Rate and Rhythm: Normal rate and regular rhythm.      Pulses: Normal pulses.           Radial pulses are 2+ on the right side and 2+ on the left side.        Dorsalis pedis pulses are 2+ on the right side and 2+ on the left side.        Posterior tibial pulses are 2+ on the right side and 2+ on the left side.      Heart sounds: Normal heart sounds, S1 normal and S2 normal. Heart sounds not distant. No murmur. No friction rub. No gallop.    Pulmonary:      Effort: Pulmonary effort is normal.      Breath sounds: Normal breath sounds.   Abdominal:      General: Abdomen is flat. Bowel sounds are normal. There is no distension.      Palpations: Abdomen is soft. There is no mass.      Tenderness: There is no abdominal tenderness. There is no right CVA tenderness, left CVA tenderness, guarding or rebound.      Hernia: No hernia is present.   Musculoskeletal: Normal range of motion.          General: No tenderness or deformity.   Skin:     General: Skin is warm and dry.      Capillary Refill: Capillary refill takes less than 2 seconds.      Coloration: Skin is not jaundiced or pale.      Findings: No rash.   Neurological:      General: No focal deficit present.      Mental Status: He is alert and oriented to person, place, and time.      GCS: GCS eye subscore is 4. GCS verbal subscore is 5. GCS motor subscore is 6.      Cranial Nerves: Cranial nerves are intact.      Sensory: Sensation is intact. No sensory deficit.      Motor: Motor function is intact. No weakness or abnormal muscle tone.      Coordination: Coordination is intact.      Gait: Gait normal.   Psychiatric:         Mood and Affect: Mood normal.         Behavior: Behavior normal. Behavior is cooperative.         Thought Content: Thought content normal.         Judgment: Judgment normal.         Procedures           ED Course  ED Course as of Dec 10 2031   Thu Dec 10, 2020   1819 ABG reviewed at this time.  Patient is noted to have normal pH.    [AL]      ED Course User Index  [AL] Joann Sen, NP      No radiology results for the last day  Medications   insulin regular (humuLIN R,novoLIN R) injection 9 Units (9 Units Intravenous Given 12/10/20 1820)   sodium chloride 0.9 % bolus 1,000 mL (0 mL Intravenous Stopped 12/10/20 1946)     Labs Reviewed   COMPREHENSIVE METABOLIC PANEL - Abnormal; Notable for the following components:       Result Value    Glucose 565 (*)     BUN 24 (*)     Creatinine 1.56 (*)     Sodium 134 (*)     eGFR Non  Amer 42 (*)     All other components within normal limits    Narrative:     GFR Normal >60  Chronic Kidney Disease <60  Kidney Failure <15     URINALYSIS W/ MICROSCOPIC IF INDICATED (NO CULTURE) - Abnormal; Notable for the following components:    Glucose, UA >=1000 mg/dL (3+) (*)     All other components within normal limits    Narrative:     Urine microscopic not indicated.   CBC WITH AUTO  DIFFERENTIAL - Abnormal; Notable for the following components:    Hemoglobin 12.0 (*)     MCHC 31.0 (*)     RDW 16.8 (*)     Neutrophil % 77.2 (*)     Lymphocyte % 12.0 (*)     All other components within normal limits   PROTIME-INR - Abnormal; Notable for the following components:    Protime 11.9 (*)     INR 1.09 (*)     All other components within normal limits   BLOOD GAS, ARTERIAL - Abnormal; Notable for the following components:    pO2, Arterial 66.4 (*)     Base Excess, Arterial -1.4 (*)     O2 Saturation, Arterial 93.0 (*)     All other components within normal limits   POCT GLUCOSE FINGERSTICK - Abnormal; Notable for the following components:    Glucose >500 (*)     All other components within normal limits   POCT GLUCOSE FINGERSTICK - Abnormal; Notable for the following components:    Glucose 288 (*)     All other components within normal limits   MAGNESIUM - Normal   PHOSPHORUS - Normal   ACETONE - Normal   BLOOD GAS, ARTERIAL   RAINBOW DRAW    Narrative:     The following orders were created for panel order [place] Draw.  Procedure                               Abnormality         Status                     ---------                               -----------         ------                     Light Blue Top[[id number]]                                   Final result               Green Top (Gel)[[id number]]                                  Final result               Lavender Top[[id number]]                                     Final result               Gold Top - SST[[id number]]                                   Final result                 Please view results for these tests on the individual orders.   BLOOD GAS, ARTERIAL   POCT GLUCOSE FINGERSTICK   POCT GLUCOSE FINGERSTICK   POCT GLUCOSE FINGERSTICK   POCT GLUCOSE FINGERSTICK   POCT GLUCOSE FINGERSTICK   POCT GLUCOSE FINGERSTICK   POCT GLUCOSE FINGERSTICK   POCT GLUCOSE FINGERSTICK   POCT GLUCOSE FINGERSTICK   POCT GLUCOSE FINGERSTICK   POCT GLUCOSE  FINGERSTICK   POCT GLUCOSE FINGERSTICK   POCT GLUCOSE FINGERSTICK   POCT GLUCOSE FINGERSTICK   POCT GLUCOSE FINGERSTICK   POCT GLUCOSE FINGERSTICK   POCT GLUCOSE FINGERSTICK   POCT GLUCOSE FINGERSTICK   POCT GLUCOSE FINGERSTICK   POCT GLUCOSE FINGERSTICK   POCT GLUCOSE FINGERSTICK   POCT GLUCOSE FINGERSTICK   POCT GLUCOSE FINGERSTICK   POCT GLUCOSE FINGERSTICK   POCT GLUCOSE FINGERSTICK   POCT GLUCOSE FINGERSTICK   POCT GLUCOSE FINGERSTICK   POCT GLUCOSE FINGERSTICK   CBC AND DIFFERENTIAL    Narrative:     The following orders were created for panel order CBC & Differential.  Procedure                               Abnormality         Status                     ---------                               -----------         ------                     CBC Auto Differential[[id number]]        Abnormal            Final result                 Please view results for these tests on the individual orders.   KETONE BODIES SERUM    Narrative:     The following orders were created for panel order Ketone Bodies, Serum (Not performed at [facility]).  Procedure                               Abnormality         Status                     ---------                               -----------         ------                     Acetone[[id number]]                      Normal              Final result                 Please view results for these tests on the individual orders.   LIGHT BLUE TOP   GREEN TOP   LAVENDER TOP   GOLD TOP - SST                                          MDM  Number of Diagnoses or Management Options  Hyperglycemia:   Diagnosis management comments: Chart Review: 11/24/2020 patient was seen at this facility admitted for GI bleed and hyperglycemia.  Comorbidity: Past Medical History:  No date: A-fib (CMS/[facility])  No date: Cancer (CMS/[facility])      Comment:  colon  No date: Hypertension  No date: Type 2 diabetes mellitus (CMS/[facility])    Patient undressed and placed in gown for exam.  Appropriate PPE worn during patient exam.  89-year-old male presents with complaint of elevated blood sugar at home.  He reports he was sent by the home health nurse.  He denies any complaints otherwise.  He denies recent illness chest pain dyspnea.  Denies fever sweats chills cough.  Denies nausea vomiting diarrhea melena hematochezia or abdominal pain.  Denies headache focal deficits.  He does report decreased appetite.  IV established and labs obtained.  Hyperglycemic protocol initiated.  Patient was given normal saline 1 L bolus and regular insulin 9 units IV.  Upon reassessment, patient remains pink warm and dry no distress noted.  He still remains free of complaints.  His blood sugar has decreased to 286 mg/dL after all saline bolus and regular insulin bolus.  He is directed to watch his blood sugars closely.    Disposition/Treatment: Discussed results with patient, verbalized understanding.  Discussed reasons to return to the ER, patient verbalized understanding.  Agreeable with plan of care.  Patient was stable upon discharge.    EMR Dragon transcription disclaimer:  Some of this encounter note is an electronic transcription translation of spoken language to printed text. The electronic translation of spoken language may permit erroneous, or at times, nonsensical words or phrases to be inadvertently transcribed; Although I have reviewed the note for such errors some may still exist.              Amount and/or Complexity of Data Reviewed  Clinical lab tests: reviewed  Decide to obtain previous medical records or to obtain history from someone other than the patient: yes    Patient Progress  Patient progress: stable      Final diagnoses:   Hyperglycemia            Joann Sen NP  12/10/20 2031

## 2020-12-10 NOTE — ED NOTES
There patient was sent in by home health due to being hyperglycemic.  Patient has no other signs or symptoms at this time.     Aleja Salazar, RN  12/10/20 7460

## 2020-12-10 NOTE — ED NOTES
Santi with Lab called to advise Glucose level of 565. Provider notified      Claire Toribio RN  12/10/20 4083

## 2020-12-11 NOTE — ED NOTES
Patient in stable condition no new completes and vitals are WNL, patients son is on the way to  the patient.     Aleja Salazar RN  12/10/20 2049

## 2020-12-11 NOTE — DISCHARGE INSTRUCTIONS
Eat a low-carb diet.  Plenty of fluids.  Monitor your sugars closely.  Schedule follow-up with your primary care physician for recheck.  Return to ER for new or worsening symptoms.

## 2021-01-01 ENCOUNTER — APPOINTMENT (OUTPATIENT)
Dept: CT IMAGING | Facility: HOSPITAL | Age: 86
End: 2021-01-01

## 2021-01-01 ENCOUNTER — LAB (OUTPATIENT)
Dept: LAB | Facility: HOSPITAL | Age: 86
End: 2021-01-01

## 2021-01-01 ENCOUNTER — APPOINTMENT (OUTPATIENT)
Dept: CARDIOLOGY | Facility: HOSPITAL | Age: 86
End: 2021-01-01

## 2021-01-01 ENCOUNTER — HOSPITAL ENCOUNTER (INPATIENT)
Facility: HOSPITAL | Age: 86
LOS: 5 days | Discharge: NURSING, REHAB OR LONG-TERM CARE | End: 2021-06-25
Attending: FAMILY MEDICINE | Admitting: FAMILY MEDICINE

## 2021-01-01 ENCOUNTER — APPOINTMENT (OUTPATIENT)
Dept: GENERAL RADIOLOGY | Facility: HOSPITAL | Age: 86
End: 2021-01-01

## 2021-01-01 ENCOUNTER — ANESTHESIA EVENT (OUTPATIENT)
Dept: GASTROENTEROLOGY | Facility: HOSPITAL | Age: 86
End: 2021-01-01

## 2021-01-01 ENCOUNTER — ANESTHESIA (OUTPATIENT)
Dept: GASTROENTEROLOGY | Facility: HOSPITAL | Age: 86
End: 2021-01-01

## 2021-01-01 ENCOUNTER — HOSPITAL ENCOUNTER (OUTPATIENT)
Facility: HOSPITAL | Age: 86
Discharge: NURSING, REHAB OR LONG-TERM CARE | End: 2021-03-17
Attending: INTERNAL MEDICINE | Admitting: INTERNAL MEDICINE

## 2021-01-01 ENCOUNTER — HOSPITAL ENCOUNTER (INPATIENT)
Facility: HOSPITAL | Age: 86
LOS: 1 days | Discharge: NURSING, REHAB OR LONG-TERM CARE | End: 2021-07-13
Attending: EMERGENCY MEDICINE | Admitting: FAMILY MEDICINE

## 2021-01-01 ENCOUNTER — ON CAMPUS - OUTPATIENT (OUTPATIENT)
Dept: URBAN - METROPOLITAN AREA HOSPITAL 85 | Facility: HOSPITAL | Age: 86
End: 2021-01-01
Payer: MEDICARE

## 2021-01-01 ENCOUNTER — ON CAMPUS - OUTPATIENT (OUTPATIENT)
Dept: URBAN - METROPOLITAN AREA HOSPITAL 85 | Facility: HOSPITAL | Age: 86
End: 2021-01-01

## 2021-01-01 VITALS
RESPIRATION RATE: 14 BRPM | BODY MASS INDEX: 27.69 KG/M2 | HEIGHT: 71 IN | DIASTOLIC BLOOD PRESSURE: 88 MMHG | SYSTOLIC BLOOD PRESSURE: 133 MMHG | HEART RATE: 80 BPM | TEMPERATURE: 96.1 F | OXYGEN SATURATION: 93 % | WEIGHT: 197.75 LBS

## 2021-01-01 VITALS
OXYGEN SATURATION: 93 % | HEART RATE: 69 BPM | HEIGHT: 71 IN | BODY MASS INDEX: 28.39 KG/M2 | RESPIRATION RATE: 18 BRPM | DIASTOLIC BLOOD PRESSURE: 69 MMHG | SYSTOLIC BLOOD PRESSURE: 113 MMHG | TEMPERATURE: 98 F | WEIGHT: 202.8 LBS

## 2021-01-01 VITALS
BODY MASS INDEX: 26.73 KG/M2 | HEART RATE: 79 BPM | DIASTOLIC BLOOD PRESSURE: 64 MMHG | SYSTOLIC BLOOD PRESSURE: 100 MMHG | OXYGEN SATURATION: 90 % | HEIGHT: 70 IN | RESPIRATION RATE: 18 BRPM | WEIGHT: 186.73 LBS | TEMPERATURE: 97.4 F

## 2021-01-01 DIAGNOSIS — Z79.4 TYPE 2 DIABETES MELLITUS WITH HYPERGLYCEMIA, WITH LONG-TERM CURRENT USE OF INSULIN (HCC): ICD-10-CM

## 2021-01-01 DIAGNOSIS — R53.83 LETHARGY: ICD-10-CM

## 2021-01-01 DIAGNOSIS — Z85.038 PERSONAL HISTORY OF OTHER MALIGNANT NEOPLASM OF LARGE INTEST: ICD-10-CM

## 2021-01-01 DIAGNOSIS — K57.31 DIVERTICULOSIS OF LARGE INTESTINE WITHOUT PERFORATION OR ABS: ICD-10-CM

## 2021-01-01 DIAGNOSIS — K62.5 HEMORRHAGE OF ANUS AND RECTUM: ICD-10-CM

## 2021-01-01 DIAGNOSIS — J90 PLEURAL EFFUSION ON RIGHT: ICD-10-CM

## 2021-01-01 DIAGNOSIS — E16.2 HYPOGLYCEMIA: Primary | ICD-10-CM

## 2021-01-01 DIAGNOSIS — K92.2 GASTROINTESTINAL HEMORRHAGE, UNSPECIFIED GASTROINTESTINAL HEMORRHAGE TYPE: Primary | ICD-10-CM

## 2021-01-01 DIAGNOSIS — I48.91 ATRIAL FIBRILLATION, UNSPECIFIED TYPE (HCC): ICD-10-CM

## 2021-01-01 DIAGNOSIS — D50.0 IRON DEFICIENCY ANEMIA SECONDARY TO BLOOD LOSS (CHRONIC): ICD-10-CM

## 2021-01-01 DIAGNOSIS — E11.65 TYPE 2 DIABETES MELLITUS WITH HYPERGLYCEMIA, WITH LONG-TERM CURRENT USE OF INSULIN (HCC): ICD-10-CM

## 2021-01-01 DIAGNOSIS — Z90.49 ACQUIRED ABSENCE OF OTHER SPECIFIED PARTS OF DIGESTIVE TRACT: ICD-10-CM

## 2021-01-01 LAB
ABO GROUP BLD: NORMAL
ABO GROUP BLD: NORMAL
ALBUMIN SERPL-MCNC: 3 G/DL (ref 3.5–5.2)
ALBUMIN SERPL-MCNC: 3.2 G/DL (ref 3.5–5.2)
ALBUMIN SERPL-MCNC: 3.2 G/DL (ref 3.5–5.2)
ALBUMIN SERPL-MCNC: 3.3 G/DL (ref 3.5–5.2)
ALBUMIN SERPL-MCNC: 3.6 G/DL (ref 3.5–5.2)
ALBUMIN SERPL-MCNC: 3.8 G/DL (ref 3.5–5.2)
ALBUMIN/GLOB SERPL: 0.9 G/DL
ALBUMIN/GLOB SERPL: 1 G/DL
ALBUMIN/GLOB SERPL: 1 G/DL
ALP SERPL-CCNC: 56 U/L (ref 39–117)
ALP SERPL-CCNC: 61 U/L (ref 39–117)
ALP SERPL-CCNC: 66 U/L (ref 39–117)
ALP SERPL-CCNC: 70 U/L (ref 39–117)
ALP SERPL-CCNC: 75 U/L (ref 39–117)
ALP SERPL-CCNC: 77 U/L (ref 39–117)
ALT SERPL W P-5'-P-CCNC: 10 U/L (ref 1–41)
ALT SERPL W P-5'-P-CCNC: 13 U/L (ref 1–41)
ALT SERPL W P-5'-P-CCNC: 13 U/L (ref 1–41)
ALT SERPL W P-5'-P-CCNC: 6 U/L (ref 1–41)
ALT SERPL W P-5'-P-CCNC: 6 U/L (ref 1–41)
ALT SERPL W P-5'-P-CCNC: 8 U/L (ref 1–41)
AMYLASE SERPL-CCNC: 21 U/L (ref 28–100)
ANION GAP SERPL CALCULATED.3IONS-SCNC: 10 MMOL/L (ref 5–15)
ANION GAP SERPL CALCULATED.3IONS-SCNC: 10 MMOL/L (ref 5–15)
ANION GAP SERPL CALCULATED.3IONS-SCNC: 11 MMOL/L (ref 5–15)
ANION GAP SERPL CALCULATED.3IONS-SCNC: 12 MMOL/L (ref 5–15)
ANION GAP SERPL CALCULATED.3IONS-SCNC: 12.6 MMOL/L (ref 5–15)
ANION GAP SERPL CALCULATED.3IONS-SCNC: 13 MMOL/L (ref 5–15)
ANION GAP SERPL CALCULATED.3IONS-SCNC: 9 MMOL/L (ref 5–15)
APTT PPP: 30 SECONDS (ref 24–31)
AST SERPL-CCNC: 13 U/L (ref 1–40)
AST SERPL-CCNC: 15 U/L (ref 1–40)
AST SERPL-CCNC: 15 U/L (ref 1–40)
AST SERPL-CCNC: 17 U/L (ref 1–40)
AST SERPL-CCNC: 20 U/L (ref 1–40)
AST SERPL-CCNC: 9 U/L (ref 1–40)
B PARAPERT DNA SPEC QL NAA+PROBE: NOT DETECTED
B PERT DNA SPEC QL NAA+PROBE: NOT DETECTED
BACTERIA SPEC AEROBE CULT: NORMAL
BACTERIA SPEC AEROBE CULT: NORMAL
BACTERIA UR QL AUTO: ABNORMAL /HPF
BASOPHILS # BLD AUTO: 0 10*3/MM3 (ref 0–0.2)
BASOPHILS # BLD AUTO: 0.1 10*3/MM3 (ref 0–0.2)
BASOPHILS NFR BLD AUTO: 0.4 % (ref 0–1.5)
BASOPHILS NFR BLD AUTO: 0.7 % (ref 0–1.5)
BASOPHILS NFR BLD AUTO: 0.8 % (ref 0–1.5)
BASOPHILS NFR BLD AUTO: 0.8 % (ref 0–1.5)
BASOPHILS NFR BLD AUTO: 0.9 % (ref 0–1.5)
BASOPHILS NFR BLD AUTO: 0.9 % (ref 0–1.5)
BASOPHILS NFR BLD AUTO: 1.2 % (ref 0–1.5)
BASOPHILS NFR BLD AUTO: 1.3 % (ref 0–1.5)
BASOPHILS NFR BLD AUTO: 1.3 % (ref 0–1.5)
BASOPHILS NFR BLD AUTO: 1.5 % (ref 0–1.5)
BASOPHILS NFR BLD AUTO: 1.6 % (ref 0–1.5)
BH CV ECHO MEAS - ACS: 2 CM
BH CV ECHO MEAS - AO MAX PG (FULL): 1.4 MMHG
BH CV ECHO MEAS - AO MAX PG: 2.6 MMHG
BH CV ECHO MEAS - AO MEAN PG (FULL): 0.71 MMHG
BH CV ECHO MEAS - AO MEAN PG: 1.3 MMHG
BH CV ECHO MEAS - AO ROOT AREA: 12.5 CM^2
BH CV ECHO MEAS - AO ROOT DIAM: 4 CM
BH CV ECHO MEAS - AO V2 MAX: 80.4 CM/SEC
BH CV ECHO MEAS - AO V2 MEAN: 52.2 CM/SEC
BH CV ECHO MEAS - AO V2 VTI: 12.9 CM
BH CV ECHO MEAS - AORTIC HR: 90.6 BPM
BH CV ECHO MEAS - AORTIC R-R: 0.66 SEC
BH CV ECHO MEAS - ASC AORTA: 3.3 CM
BH CV ECHO MEAS - AVA(I,A): 2.2 CM^2
BH CV ECHO MEAS - AVA(I,D): 2.2 CM^2
BH CV ECHO MEAS - AVA(V,A): 2.3 CM^2
BH CV ECHO MEAS - AVA(V,D): 2.3 CM^2
BH CV ECHO MEAS - CO(AO): 14.7 L/MIN
BH CV ECHO MEAS - CO(LVOT): 2.6 L/MIN
BH CV ECHO MEAS - EDV(CUBED): 117.8 ML
BH CV ECHO MEAS - EDV(MOD-SP4): 142.2 ML
BH CV ECHO MEAS - EDV(TEICH): 112.9 ML
BH CV ECHO MEAS - EF(CUBED): 16.6 %
BH CV ECHO MEAS - EF(MOD-BP): 25 %
BH CV ECHO MEAS - EF(MOD-SP4): 25.2 %
BH CV ECHO MEAS - EF(TEICH): 13.2 %
BH CV ECHO MEAS - ESV(CUBED): 98.3 ML
BH CV ECHO MEAS - ESV(MOD-SP4): 106.4 ML
BH CV ECHO MEAS - ESV(TEICH): 98.1 ML
BH CV ECHO MEAS - FS: 5.9 %
BH CV ECHO MEAS - IVS/LVPW: 1.1
BH CV ECHO MEAS - IVSD: 1.3 CM
BH CV ECHO MEAS - LA DIMENSION(2D): 5.4 CM
BH CV ECHO MEAS - LA DIMENSION: 5.6 CM
BH CV ECHO MEAS - LA/AO: 1.4
BH CV ECHO MEAS - LV MASS(C)D: 249.5 GRAMS
BH CV ECHO MEAS - LV MAX PG: 1.2 MMHG
BH CV ECHO MEAS - LV MEAN PG: 0.62 MMHG
BH CV ECHO MEAS - LV V1 MAX: 54.6 CM/SEC
BH CV ECHO MEAS - LV V1 MEAN: 36.6 CM/SEC
BH CV ECHO MEAS - LV V1 VTI: 8.5 CM
BH CV ECHO MEAS - LVIDD: 4.9 CM
BH CV ECHO MEAS - LVIDS: 4.6 CM
BH CV ECHO MEAS - LVOT AREA: 3.4 CM^2
BH CV ECHO MEAS - LVOT DIAM: 2.1 CM
BH CV ECHO MEAS - LVPWD: 1.2 CM
BH CV ECHO MEAS - MV DEC TIME: 0.15 SEC
BH CV ECHO MEAS - MV E MAX VEL: 102.7 CM/SEC
BH CV ECHO MEAS - MV MAX PG: 4.4 MMHG
BH CV ECHO MEAS - MV MEAN PG: 2.2 MMHG
BH CV ECHO MEAS - MV V2 MAX: 105.2 CM/SEC
BH CV ECHO MEAS - MV V2 MEAN: 67.1 CM/SEC
BH CV ECHO MEAS - MV V2 VTI: 18.7 CM
BH CV ECHO MEAS - MVA(VTI): 1.5 CM^2
BH CV ECHO MEAS - PA MAX PG (FULL): 0.54 MMHG
BH CV ECHO MEAS - PA MAX PG: 1.2 MMHG
BH CV ECHO MEAS - PA V2 MAX: 53.6 CM/SEC
BH CV ECHO MEAS - RAP SYSTOLE: 3 MMHG
BH CV ECHO MEAS - RV MAX PG: 0.61 MMHG
BH CV ECHO MEAS - RV MEAN PG: 0.28 MMHG
BH CV ECHO MEAS - RV V1 MAX: 39 CM/SEC
BH CV ECHO MEAS - RV V1 MEAN: 24.2 CM/SEC
BH CV ECHO MEAS - RV V1 VTI: 6.3 CM
BH CV ECHO MEAS - RVDD: 3.7 CM
BH CV ECHO MEAS - RVSP: 24.2 MMHG
BH CV ECHO MEAS - SV(AO): 161.8 ML
BH CV ECHO MEAS - SV(CUBED): 19.6 ML
BH CV ECHO MEAS - SV(LVOT): 28.8 ML
BH CV ECHO MEAS - SV(MOD-SP4): 35.8 ML
BH CV ECHO MEAS - SV(TEICH): 14.9 ML
BH CV ECHO MEAS - TR MAX VEL: 223 CM/SEC
BILIRUB SERPL-MCNC: 0.4 MG/DL (ref 0–1.2)
BILIRUB SERPL-MCNC: 0.7 MG/DL (ref 0–1.2)
BILIRUB SERPL-MCNC: 0.9 MG/DL (ref 0–1.2)
BILIRUB SERPL-MCNC: 0.9 MG/DL (ref 0–1.2)
BILIRUB UR QL STRIP: NEGATIVE
BLD GP AB SCN SERPL QL: NEGATIVE
BLD GP AB SCN SERPL QL: NEGATIVE
BUN SERPL-MCNC: 16 MG/DL (ref 8–23)
BUN SERPL-MCNC: 17 MG/DL (ref 8–23)
BUN SERPL-MCNC: 22 MG/DL (ref 8–23)
BUN SERPL-MCNC: 26 MG/DL (ref 8–23)
BUN SERPL-MCNC: 26 MG/DL (ref 8–23)
BUN SERPL-MCNC: 33 MG/DL (ref 8–23)
BUN SERPL-MCNC: 35 MG/DL (ref 8–23)
BUN SERPL-MCNC: 36 MG/DL (ref 8–23)
BUN SERPL-MCNC: 38 MG/DL (ref 8–23)
BUN SERPL-MCNC: 39 MG/DL (ref 8–23)
BUN SERPL-MCNC: 40 MG/DL (ref 8–23)
BUN SERPL-MCNC: 40 MG/DL (ref 8–23)
BUN SERPL-MCNC: 42 MG/DL (ref 8–23)
BUN SERPL-MCNC: 43 MG/DL (ref 8–23)
BUN SERPL-MCNC: 43 MG/DL (ref 8–23)
BUN/CREAT SERPL: 10.8 (ref 7–25)
BUN/CREAT SERPL: 11.1 (ref 7–25)
BUN/CREAT SERPL: 14 (ref 7–25)
BUN/CREAT SERPL: 15.5 (ref 7–25)
BUN/CREAT SERPL: 15.9 (ref 7–25)
BUN/CREAT SERPL: 16.3 (ref 7–25)
BUN/CREAT SERPL: 16.3 (ref 7–25)
BUN/CREAT SERPL: 16.4 (ref 7–25)
BUN/CREAT SERPL: 16.7 (ref 7–25)
BUN/CREAT SERPL: 16.7 (ref 7–25)
BUN/CREAT SERPL: 17 (ref 7–25)
BUN/CREAT SERPL: 17 (ref 7–25)
BUN/CREAT SERPL: 17.3 (ref 7–25)
BUN/CREAT SERPL: 17.8 (ref 7–25)
BUN/CREAT SERPL: 18 (ref 7–25)
BUN/CREAT SERPL: 18 (ref 7–25)
BUN/CREAT SERPL: 18.3 (ref 7–25)
BUN/CREAT SERPL: 20.5 (ref 7–25)
BUN/CREAT SERPL: 20.7 (ref 7–25)
C PNEUM DNA NPH QL NAA+NON-PROBE: NOT DETECTED
CALCIUM SPEC-SCNC: 7.6 MG/DL (ref 8.2–9.6)
CALCIUM SPEC-SCNC: 7.8 MG/DL (ref 8.2–9.6)
CALCIUM SPEC-SCNC: 7.9 MG/DL (ref 8.2–9.6)
CALCIUM SPEC-SCNC: 8 MG/DL (ref 8.2–9.6)
CALCIUM SPEC-SCNC: 8 MG/DL (ref 8.2–9.6)
CALCIUM SPEC-SCNC: 8.1 MG/DL (ref 8.2–9.6)
CALCIUM SPEC-SCNC: 8.2 MG/DL (ref 8.2–9.6)
CALCIUM SPEC-SCNC: 8.2 MG/DL (ref 8.2–9.6)
CALCIUM SPEC-SCNC: 8.3 MG/DL (ref 8.2–9.6)
CALCIUM SPEC-SCNC: 8.4 MG/DL (ref 8.2–9.6)
CALCIUM SPEC-SCNC: 8.5 MG/DL (ref 8.2–9.6)
CALCIUM SPEC-SCNC: 8.6 MG/DL (ref 8.2–9.6)
CHLORIDE SERPL-SCNC: 105 MMOL/L (ref 98–107)
CHLORIDE SERPL-SCNC: 106 MMOL/L (ref 98–107)
CHLORIDE SERPL-SCNC: 107 MMOL/L (ref 98–107)
CHLORIDE SERPL-SCNC: 108 MMOL/L (ref 98–107)
CHLORIDE SERPL-SCNC: 109 MMOL/L (ref 98–107)
CHLORIDE SERPL-SCNC: 110 MMOL/L (ref 98–107)
CHLORIDE SERPL-SCNC: 111 MMOL/L (ref 98–107)
CHLORIDE SERPL-SCNC: 112 MMOL/L (ref 98–107)
CHOLEST SERPL-MCNC: 88 MG/DL (ref 0–200)
CK SERPL-CCNC: 66 U/L (ref 20–200)
CLARITY UR: CLEAR
CO2 SERPL-SCNC: 17 MMOL/L (ref 22–29)
CO2 SERPL-SCNC: 18 MMOL/L (ref 22–29)
CO2 SERPL-SCNC: 18.4 MMOL/L (ref 22–29)
CO2 SERPL-SCNC: 19 MMOL/L (ref 22–29)
CO2 SERPL-SCNC: 20 MMOL/L (ref 22–29)
CO2 SERPL-SCNC: 21 MMOL/L (ref 22–29)
CO2 SERPL-SCNC: 21 MMOL/L (ref 22–29)
CO2 SERPL-SCNC: 22 MMOL/L (ref 22–29)
CO2 SERPL-SCNC: 23 MMOL/L (ref 22–29)
CO2 SERPL-SCNC: 23 MMOL/L (ref 22–29)
COLOR UR: YELLOW
CREAT SERPL-MCNC: 1.46 MG/DL (ref 0.76–1.27)
CREAT SERPL-MCNC: 1.48 MG/DL (ref 0.76–1.27)
CREAT SERPL-MCNC: 1.53 MG/DL (ref 0.76–1.27)
CREAT SERPL-MCNC: 1.57 MG/DL (ref 0.76–1.27)
CREAT SERPL-MCNC: 1.59 MG/DL (ref 0.76–1.27)
CREAT SERPL-MCNC: 1.83 MG/DL (ref 0.76–1.27)
CREAT SERPL-MCNC: 1.93 MG/DL (ref 0.76–1.27)
CREAT SERPL-MCNC: 2.05 MG/DL (ref 0.76–1.27)
CREAT SERPL-MCNC: 2.11 MG/DL (ref 0.76–1.27)
CREAT SERPL-MCNC: 2.12 MG/DL (ref 0.76–1.27)
CREAT SERPL-MCNC: 2.13 MG/DL (ref 0.76–1.27)
CREAT SERPL-MCNC: 2.2 MG/DL (ref 0.76–1.27)
CREAT SERPL-MCNC: 2.43 MG/DL (ref 0.76–1.27)
CREAT SERPL-MCNC: 2.46 MG/DL (ref 0.76–1.27)
CREAT SERPL-MCNC: 2.47 MG/DL (ref 0.76–1.27)
CREAT SERPL-MCNC: 2.52 MG/DL (ref 0.76–1.27)
CREAT SERPL-MCNC: 2.52 MG/DL (ref 0.76–1.27)
CREAT SERPL-MCNC: 2.64 MG/DL (ref 0.76–1.27)
CREAT SERPL-MCNC: 2.78 MG/DL (ref 0.76–1.27)
DEPRECATED RDW RBC AUTO: 51.2 FL (ref 37–54)
DEPRECATED RDW RBC AUTO: 52.9 FL (ref 37–54)
DEPRECATED RDW RBC AUTO: 54.3 FL (ref 37–54)
DEPRECATED RDW RBC AUTO: 56 FL (ref 37–54)
DEPRECATED RDW RBC AUTO: 56.4 FL (ref 37–54)
DEPRECATED RDW RBC AUTO: 58.2 FL (ref 37–54)
DEPRECATED RDW RBC AUTO: 59.9 FL (ref 37–54)
DEPRECATED RDW RBC AUTO: 60.4 FL (ref 37–54)
DEPRECATED RDW RBC AUTO: 61.7 FL (ref 37–54)
DEPRECATED RDW RBC AUTO: 63.4 FL (ref 37–54)
DEPRECATED RDW RBC AUTO: 66.1 FL (ref 37–54)
EOSINOPHIL # BLD AUTO: 0 10*3/MM3 (ref 0–0.4)
EOSINOPHIL # BLD AUTO: 0.1 10*3/MM3 (ref 0–0.4)
EOSINOPHIL # BLD AUTO: 0.2 10*3/MM3 (ref 0–0.4)
EOSINOPHIL # BLD AUTO: 0.3 10*3/MM3 (ref 0–0.4)
EOSINOPHIL # BLD AUTO: 0.3 10*3/MM3 (ref 0–0.4)
EOSINOPHIL # BLD AUTO: 0.4 10*3/MM3 (ref 0–0.4)
EOSINOPHIL # BLD AUTO: 0.4 10*3/MM3 (ref 0–0.4)
EOSINOPHIL # BLD AUTO: 0.5 10*3/MM3 (ref 0–0.4)
EOSINOPHIL # BLD AUTO: 0.5 10*3/MM3 (ref 0–0.4)
EOSINOPHIL NFR BLD AUTO: 0.7 % (ref 0.3–6.2)
EOSINOPHIL NFR BLD AUTO: 2 % (ref 0.3–6.2)
EOSINOPHIL NFR BLD AUTO: 2.1 % (ref 0.3–6.2)
EOSINOPHIL NFR BLD AUTO: 2.1 % (ref 0.3–6.2)
EOSINOPHIL NFR BLD AUTO: 2.4 % (ref 0.3–6.2)
EOSINOPHIL NFR BLD AUTO: 3 % (ref 0.3–6.2)
EOSINOPHIL NFR BLD AUTO: 3.2 % (ref 0.3–6.2)
EOSINOPHIL NFR BLD AUTO: 4.4 % (ref 0.3–6.2)
EOSINOPHIL NFR BLD AUTO: 5.1 % (ref 0.3–6.2)
EOSINOPHIL NFR BLD AUTO: 5.5 % (ref 0.3–6.2)
EOSINOPHIL NFR BLD AUTO: 5.7 % (ref 0.3–6.2)
EOSINOPHIL NFR BLD AUTO: 6.3 % (ref 0.3–6.2)
EOSINOPHIL NFR BLD AUTO: 6.7 % (ref 0.3–6.2)
ERYTHROCYTE [DISTWIDTH] IN BLOOD BY AUTOMATED COUNT: 17.8 % (ref 12.3–15.4)
ERYTHROCYTE [DISTWIDTH] IN BLOOD BY AUTOMATED COUNT: 18.2 % (ref 12.3–15.4)
ERYTHROCYTE [DISTWIDTH] IN BLOOD BY AUTOMATED COUNT: 18.4 % (ref 12.3–15.4)
ERYTHROCYTE [DISTWIDTH] IN BLOOD BY AUTOMATED COUNT: 18.7 % (ref 12.3–15.4)
ERYTHROCYTE [DISTWIDTH] IN BLOOD BY AUTOMATED COUNT: 18.7 % (ref 12.3–15.4)
ERYTHROCYTE [DISTWIDTH] IN BLOOD BY AUTOMATED COUNT: 22.8 % (ref 12.3–15.4)
ERYTHROCYTE [DISTWIDTH] IN BLOOD BY AUTOMATED COUNT: 23.4 % (ref 12.3–15.4)
ERYTHROCYTE [DISTWIDTH] IN BLOOD BY AUTOMATED COUNT: 23.5 % (ref 12.3–15.4)
ERYTHROCYTE [DISTWIDTH] IN BLOOD BY AUTOMATED COUNT: 23.7 % (ref 12.3–15.4)
ERYTHROCYTE [DISTWIDTH] IN BLOOD BY AUTOMATED COUNT: 23.9 % (ref 12.3–15.4)
ERYTHROCYTE [DISTWIDTH] IN BLOOD BY AUTOMATED COUNT: 24.3 % (ref 12.3–15.4)
ERYTHROCYTE [DISTWIDTH] IN BLOOD BY AUTOMATED COUNT: 24.7 % (ref 12.3–15.4)
ERYTHROCYTE [DISTWIDTH] IN BLOOD BY AUTOMATED COUNT: 25.4 % (ref 12.3–15.4)
FLUAV SUBTYP SPEC NAA+PROBE: NOT DETECTED
FLUBV RNA ISLT QL NAA+PROBE: NOT DETECTED
GFR SERPL CREATININE-BSD FRML MDRD: 21 ML/MIN/1.73
GFR SERPL CREATININE-BSD FRML MDRD: 23 ML/MIN/1.73
GFR SERPL CREATININE-BSD FRML MDRD: 24 ML/MIN/1.73
GFR SERPL CREATININE-BSD FRML MDRD: 24 ML/MIN/1.73
GFR SERPL CREATININE-BSD FRML MDRD: 25 ML/MIN/1.73
GFR SERPL CREATININE-BSD FRML MDRD: 28 ML/MIN/1.73
GFR SERPL CREATININE-BSD FRML MDRD: 29 ML/MIN/1.73
GFR SERPL CREATININE-BSD FRML MDRD: 30 ML/MIN/1.73
GFR SERPL CREATININE-BSD FRML MDRD: 33 ML/MIN/1.73
GFR SERPL CREATININE-BSD FRML MDRD: 35 ML/MIN/1.73
GFR SERPL CREATININE-BSD FRML MDRD: 41 ML/MIN/1.73
GFR SERPL CREATININE-BSD FRML MDRD: 41 ML/MIN/1.73
GFR SERPL CREATININE-BSD FRML MDRD: 43 ML/MIN/1.73
GFR SERPL CREATININE-BSD FRML MDRD: 44 ML/MIN/1.73
GFR SERPL CREATININE-BSD FRML MDRD: 45 ML/MIN/1.73
GLOBULIN UR ELPH-MCNC: 3.1 GM/DL
GLOBULIN UR ELPH-MCNC: 3.5 GM/DL
GLOBULIN UR ELPH-MCNC: 3.5 GM/DL
GLOBULIN UR ELPH-MCNC: 3.7 GM/DL
GLOBULIN UR ELPH-MCNC: 3.7 GM/DL
GLOBULIN UR ELPH-MCNC: 4.1 GM/DL
GLUCOSE BLDC GLUCOMTR-MCNC: 102 MG/DL (ref 70–105)
GLUCOSE BLDC GLUCOMTR-MCNC: 103 MG/DL (ref 70–105)
GLUCOSE BLDC GLUCOMTR-MCNC: 104 MG/DL (ref 70–105)
GLUCOSE BLDC GLUCOMTR-MCNC: 106 MG/DL (ref 70–105)
GLUCOSE BLDC GLUCOMTR-MCNC: 107 MG/DL (ref 70–105)
GLUCOSE BLDC GLUCOMTR-MCNC: 110 MG/DL (ref 70–105)
GLUCOSE BLDC GLUCOMTR-MCNC: 110 MG/DL (ref 70–105)
GLUCOSE BLDC GLUCOMTR-MCNC: 115 MG/DL (ref 70–105)
GLUCOSE BLDC GLUCOMTR-MCNC: 115 MG/DL (ref 70–105)
GLUCOSE BLDC GLUCOMTR-MCNC: 122 MG/DL (ref 70–105)
GLUCOSE BLDC GLUCOMTR-MCNC: 122 MG/DL (ref 70–105)
GLUCOSE BLDC GLUCOMTR-MCNC: 123 MG/DL (ref 70–105)
GLUCOSE BLDC GLUCOMTR-MCNC: 123 MG/DL (ref 70–105)
GLUCOSE BLDC GLUCOMTR-MCNC: 124 MG/DL (ref 70–105)
GLUCOSE BLDC GLUCOMTR-MCNC: 128 MG/DL (ref 70–105)
GLUCOSE BLDC GLUCOMTR-MCNC: 131 MG/DL (ref 70–105)
GLUCOSE BLDC GLUCOMTR-MCNC: 131 MG/DL (ref 70–105)
GLUCOSE BLDC GLUCOMTR-MCNC: 134 MG/DL (ref 70–105)
GLUCOSE BLDC GLUCOMTR-MCNC: 134 MG/DL (ref 70–105)
GLUCOSE BLDC GLUCOMTR-MCNC: 135 MG/DL (ref 70–105)
GLUCOSE BLDC GLUCOMTR-MCNC: 135 MG/DL (ref 70–105)
GLUCOSE BLDC GLUCOMTR-MCNC: 138 MG/DL (ref 70–105)
GLUCOSE BLDC GLUCOMTR-MCNC: 138 MG/DL (ref 70–105)
GLUCOSE BLDC GLUCOMTR-MCNC: 141 MG/DL (ref 70–105)
GLUCOSE BLDC GLUCOMTR-MCNC: 141 MG/DL (ref 70–105)
GLUCOSE BLDC GLUCOMTR-MCNC: 142 MG/DL (ref 70–105)
GLUCOSE BLDC GLUCOMTR-MCNC: 143 MG/DL (ref 70–105)
GLUCOSE BLDC GLUCOMTR-MCNC: 145 MG/DL (ref 70–105)
GLUCOSE BLDC GLUCOMTR-MCNC: 146 MG/DL (ref 70–105)
GLUCOSE BLDC GLUCOMTR-MCNC: 147 MG/DL (ref 70–105)
GLUCOSE BLDC GLUCOMTR-MCNC: 148 MG/DL (ref 70–105)
GLUCOSE BLDC GLUCOMTR-MCNC: 149 MG/DL (ref 70–105)
GLUCOSE BLDC GLUCOMTR-MCNC: 151 MG/DL (ref 70–105)
GLUCOSE BLDC GLUCOMTR-MCNC: 151 MG/DL (ref 70–105)
GLUCOSE BLDC GLUCOMTR-MCNC: 155 MG/DL (ref 70–105)
GLUCOSE BLDC GLUCOMTR-MCNC: 156 MG/DL (ref 70–105)
GLUCOSE BLDC GLUCOMTR-MCNC: 158 MG/DL (ref 70–105)
GLUCOSE BLDC GLUCOMTR-MCNC: 159 MG/DL (ref 70–105)
GLUCOSE BLDC GLUCOMTR-MCNC: 160 MG/DL (ref 70–105)
GLUCOSE BLDC GLUCOMTR-MCNC: 160 MG/DL (ref 70–105)
GLUCOSE BLDC GLUCOMTR-MCNC: 165 MG/DL (ref 70–105)
GLUCOSE BLDC GLUCOMTR-MCNC: 167 MG/DL (ref 70–105)
GLUCOSE BLDC GLUCOMTR-MCNC: 169 MG/DL (ref 70–105)
GLUCOSE BLDC GLUCOMTR-MCNC: 170 MG/DL (ref 70–105)
GLUCOSE BLDC GLUCOMTR-MCNC: 171 MG/DL (ref 70–105)
GLUCOSE BLDC GLUCOMTR-MCNC: 172 MG/DL (ref 70–105)
GLUCOSE BLDC GLUCOMTR-MCNC: 181 MG/DL (ref 70–105)
GLUCOSE BLDC GLUCOMTR-MCNC: 181 MG/DL (ref 70–105)
GLUCOSE BLDC GLUCOMTR-MCNC: 183 MG/DL (ref 70–105)
GLUCOSE BLDC GLUCOMTR-MCNC: 185 MG/DL (ref 70–105)
GLUCOSE BLDC GLUCOMTR-MCNC: 199 MG/DL (ref 70–105)
GLUCOSE BLDC GLUCOMTR-MCNC: 199 MG/DL (ref 70–105)
GLUCOSE BLDC GLUCOMTR-MCNC: 200 MG/DL (ref 70–105)
GLUCOSE BLDC GLUCOMTR-MCNC: 205 MG/DL (ref 70–105)
GLUCOSE BLDC GLUCOMTR-MCNC: 212 MG/DL (ref 70–105)
GLUCOSE BLDC GLUCOMTR-MCNC: 214 MG/DL (ref 70–105)
GLUCOSE BLDC GLUCOMTR-MCNC: 221 MG/DL (ref 70–105)
GLUCOSE BLDC GLUCOMTR-MCNC: 227 MG/DL (ref 70–105)
GLUCOSE BLDC GLUCOMTR-MCNC: 238 MG/DL (ref 70–105)
GLUCOSE BLDC GLUCOMTR-MCNC: 45 MG/DL (ref 70–105)
GLUCOSE BLDC GLUCOMTR-MCNC: 56 MG/DL (ref 70–105)
GLUCOSE BLDC GLUCOMTR-MCNC: 75 MG/DL (ref 70–105)
GLUCOSE BLDC GLUCOMTR-MCNC: 79 MG/DL (ref 70–105)
GLUCOSE BLDC GLUCOMTR-MCNC: 82 MG/DL (ref 70–105)
GLUCOSE BLDC GLUCOMTR-MCNC: 87 MG/DL (ref 70–105)
GLUCOSE BLDC GLUCOMTR-MCNC: 97 MG/DL (ref 70–105)
GLUCOSE BLDC GLUCOMTR-MCNC: 99 MG/DL (ref 70–105)
GLUCOSE SERPL-MCNC: 107 MG/DL (ref 65–99)
GLUCOSE SERPL-MCNC: 114 MG/DL (ref 65–99)
GLUCOSE SERPL-MCNC: 115 MG/DL (ref 65–99)
GLUCOSE SERPL-MCNC: 129 MG/DL (ref 65–99)
GLUCOSE SERPL-MCNC: 140 MG/DL (ref 65–99)
GLUCOSE SERPL-MCNC: 142 MG/DL (ref 65–99)
GLUCOSE SERPL-MCNC: 149 MG/DL (ref 65–99)
GLUCOSE SERPL-MCNC: 152 MG/DL (ref 65–99)
GLUCOSE SERPL-MCNC: 156 MG/DL (ref 65–99)
GLUCOSE SERPL-MCNC: 156 MG/DL (ref 65–99)
GLUCOSE SERPL-MCNC: 157 MG/DL (ref 65–99)
GLUCOSE SERPL-MCNC: 173 MG/DL (ref 65–99)
GLUCOSE SERPL-MCNC: 188 MG/DL (ref 65–99)
GLUCOSE SERPL-MCNC: 190 MG/DL (ref 65–99)
GLUCOSE SERPL-MCNC: 213 MG/DL (ref 65–99)
GLUCOSE SERPL-MCNC: 216 MG/DL (ref 65–99)
GLUCOSE SERPL-MCNC: 58 MG/DL (ref 65–99)
GLUCOSE SERPL-MCNC: 67 MG/DL (ref 65–99)
GLUCOSE SERPL-MCNC: 79 MG/DL (ref 65–99)
GLUCOSE UR STRIP-MCNC: NEGATIVE MG/DL
HADV DNA SPEC NAA+PROBE: NOT DETECTED
HBA1C MFR BLD: 7.1 % (ref 3.5–5.6)
HBA1C MFR BLD: 8.77 % (ref 4.8–5.6)
HCOV 229E RNA SPEC QL NAA+PROBE: NOT DETECTED
HCOV HKU1 RNA SPEC QL NAA+PROBE: NOT DETECTED
HCOV NL63 RNA SPEC QL NAA+PROBE: NOT DETECTED
HCOV OC43 RNA SPEC QL NAA+PROBE: NOT DETECTED
HCT VFR BLD AUTO: 25.5 % (ref 37.5–51)
HCT VFR BLD AUTO: 25.6 % (ref 37.5–51)
HCT VFR BLD AUTO: 25.6 % (ref 37.5–51)
HCT VFR BLD AUTO: 25.7 % (ref 37.5–51)
HCT VFR BLD AUTO: 26.4 % (ref 37.5–51)
HCT VFR BLD AUTO: 26.5 % (ref 37.5–51)
HCT VFR BLD AUTO: 26.6 % (ref 37.5–51)
HCT VFR BLD AUTO: 31.7 % (ref 37.5–51)
HCT VFR BLD AUTO: 32.1 % (ref 37.5–51)
HCT VFR BLD AUTO: 33.5 % (ref 37.5–51)
HCT VFR BLD AUTO: 34.5 % (ref 37.5–51)
HCT VFR BLD AUTO: 34.5 % (ref 37.5–51)
HCT VFR BLD AUTO: 35.1 % (ref 37.5–51)
HCT VFR BLD AUTO: 35.3 % (ref 37.5–51)
HCT VFR BLD AUTO: 35.8 % (ref 37.5–51)
HCT VFR BLD AUTO: 35.8 % (ref 37.5–51)
HCT VFR BLD AUTO: 36.7 % (ref 37.5–51)
HCT VFR BLD AUTO: 37.6 % (ref 37.5–51)
HDLC SERPL-MCNC: 31 MG/DL (ref 40–60)
HGB BLD-MCNC: 10.1 G/DL (ref 13–17.7)
HGB BLD-MCNC: 10.2 G/DL (ref 13–17.7)
HGB BLD-MCNC: 10.3 G/DL (ref 13–17.7)
HGB BLD-MCNC: 10.3 G/DL (ref 13–17.7)
HGB BLD-MCNC: 10.4 G/DL (ref 13–17.7)
HGB BLD-MCNC: 10.5 G/DL (ref 13–17.7)
HGB BLD-MCNC: 10.6 G/DL (ref 13–17.7)
HGB BLD-MCNC: 10.7 G/DL (ref 13–17.7)
HGB BLD-MCNC: 10.9 G/DL (ref 13–17.7)
HGB BLD-MCNC: 11.8 G/DL (ref 13–17.7)
HGB BLD-MCNC: 8.1 G/DL (ref 13–17.7)
HGB BLD-MCNC: 8.1 G/DL (ref 13–17.7)
HGB BLD-MCNC: 8.2 G/DL (ref 13–17.7)
HGB BLD-MCNC: 8.2 G/DL (ref 13–17.7)
HGB BLD-MCNC: 8.4 G/DL (ref 13–17.7)
HGB BLD-MCNC: 8.4 G/DL (ref 13–17.7)
HGB BLD-MCNC: 8.6 G/DL (ref 13–17.7)
HGB BLD-MCNC: 9.9 G/DL (ref 13–17.7)
HGB UR QL STRIP.AUTO: ABNORMAL
HMPV RNA NPH QL NAA+NON-PROBE: NOT DETECTED
HOLD SPECIMEN: NORMAL
HPIV1 RNA SPEC QL NAA+PROBE: NOT DETECTED
HPIV2 RNA SPEC QL NAA+PROBE: NOT DETECTED
HPIV3 RNA NPH QL NAA+PROBE: NOT DETECTED
HPIV4 P GENE NPH QL NAA+PROBE: NOT DETECTED
HYALINE CASTS UR QL AUTO: ABNORMAL /LPF
INR PPP: 1.28 (ref 0.93–1.1)
INR PPP: 1.32 (ref 0.93–1.1)
IRON 24H UR-MRATE: 26 MCG/DL (ref 59–158)
IRON SATN MFR SERPL: 9 % (ref 20–50)
KETONES UR QL STRIP: NEGATIVE
LDLC SERPL CALC-MCNC: 41 MG/DL (ref 0–100)
LDLC/HDLC SERPL: 1.37 {RATIO}
LEUKOCYTE ESTERASE UR QL STRIP.AUTO: NEGATIVE
LIPASE SERPL-CCNC: 8 U/L (ref 13–60)
LIPASE SERPL-CCNC: 9 U/L (ref 13–60)
LYMPHOCYTES # BLD AUTO: 0.6 10*3/MM3 (ref 0.7–3.1)
LYMPHOCYTES # BLD AUTO: 0.6 10*3/MM3 (ref 0.7–3.1)
LYMPHOCYTES # BLD AUTO: 0.7 10*3/MM3 (ref 0.7–3.1)
LYMPHOCYTES # BLD AUTO: 0.8 10*3/MM3 (ref 0.7–3.1)
LYMPHOCYTES # BLD AUTO: 0.8 10*3/MM3 (ref 0.7–3.1)
LYMPHOCYTES # BLD AUTO: 0.9 10*3/MM3 (ref 0.7–3.1)
LYMPHOCYTES # BLD AUTO: 1 10*3/MM3 (ref 0.7–3.1)
LYMPHOCYTES # BLD AUTO: 1.1 10*3/MM3 (ref 0.7–3.1)
LYMPHOCYTES # BLD AUTO: 1.1 10*3/MM3 (ref 0.7–3.1)
LYMPHOCYTES # BLD AUTO: 1.2 10*3/MM3 (ref 0.7–3.1)
LYMPHOCYTES # BLD AUTO: 1.4 10*3/MM3 (ref 0.7–3.1)
LYMPHOCYTES NFR BLD AUTO: 11.1 % (ref 19.6–45.3)
LYMPHOCYTES NFR BLD AUTO: 12 % (ref 19.6–45.3)
LYMPHOCYTES NFR BLD AUTO: 12.8 % (ref 19.6–45.3)
LYMPHOCYTES NFR BLD AUTO: 13.3 % (ref 19.6–45.3)
LYMPHOCYTES NFR BLD AUTO: 13.8 % (ref 19.6–45.3)
LYMPHOCYTES NFR BLD AUTO: 14.1 % (ref 19.6–45.3)
LYMPHOCYTES NFR BLD AUTO: 14.9 % (ref 19.6–45.3)
LYMPHOCYTES NFR BLD AUTO: 15.2 % (ref 19.6–45.3)
LYMPHOCYTES NFR BLD AUTO: 15.2 % (ref 19.6–45.3)
LYMPHOCYTES NFR BLD AUTO: 15.3 % (ref 19.6–45.3)
LYMPHOCYTES NFR BLD AUTO: 16 % (ref 19.6–45.3)
LYMPHOCYTES NFR BLD AUTO: 17.1 % (ref 19.6–45.3)
LYMPHOCYTES NFR BLD AUTO: 8.2 % (ref 19.6–45.3)
M PNEUMO IGG SER IA-ACNC: NOT DETECTED
MAGNESIUM SERPL-MCNC: 2.1 MG/DL (ref 1.7–2.3)
MCH RBC QN AUTO: 21.2 PG (ref 26.6–33)
MCH RBC QN AUTO: 21.4 PG (ref 26.6–33)
MCH RBC QN AUTO: 21.5 PG (ref 26.6–33)
MCH RBC QN AUTO: 21.5 PG (ref 26.6–33)
MCH RBC QN AUTO: 21.6 PG (ref 26.6–33)
MCH RBC QN AUTO: 21.7 PG (ref 26.6–33)
MCH RBC QN AUTO: 25.7 PG (ref 26.6–33)
MCH RBC QN AUTO: 26 PG (ref 26.6–33)
MCH RBC QN AUTO: 26.4 PG (ref 26.6–33)
MCH RBC QN AUTO: 26.4 PG (ref 26.6–33)
MCH RBC QN AUTO: 27.4 PG (ref 26.6–33)
MCHC RBC AUTO-ENTMCNC: 29.5 G/DL (ref 31.5–35.7)
MCHC RBC AUTO-ENTMCNC: 29.5 G/DL (ref 31.5–35.7)
MCHC RBC AUTO-ENTMCNC: 29.6 G/DL (ref 31.5–35.7)
MCHC RBC AUTO-ENTMCNC: 29.6 G/DL (ref 31.5–35.7)
MCHC RBC AUTO-ENTMCNC: 29.7 G/DL (ref 31.5–35.7)
MCHC RBC AUTO-ENTMCNC: 29.9 G/DL (ref 31.5–35.7)
MCHC RBC AUTO-ENTMCNC: 29.9 G/DL (ref 31.5–35.7)
MCHC RBC AUTO-ENTMCNC: 30 G/DL (ref 31.5–35.7)
MCHC RBC AUTO-ENTMCNC: 31 G/DL (ref 31.5–35.7)
MCHC RBC AUTO-ENTMCNC: 31.3 G/DL (ref 31.5–35.7)
MCHC RBC AUTO-ENTMCNC: 31.4 G/DL (ref 31.5–35.7)
MCHC RBC AUTO-ENTMCNC: 31.9 G/DL (ref 31.5–35.7)
MCHC RBC AUTO-ENTMCNC: 32.9 G/DL (ref 31.5–35.7)
MCV RBC AUTO: 71.6 FL (ref 79–97)
MCV RBC AUTO: 71.8 FL (ref 79–97)
MCV RBC AUTO: 71.9 FL (ref 79–97)
MCV RBC AUTO: 72.1 FL (ref 79–97)
MCV RBC AUTO: 72.2 FL (ref 79–97)
MCV RBC AUTO: 73 FL (ref 79–97)
MCV RBC AUTO: 73.2 FL (ref 79–97)
MCV RBC AUTO: 73.3 FL (ref 79–97)
MCV RBC AUTO: 81.5 FL (ref 79–97)
MCV RBC AUTO: 82.2 FL (ref 79–97)
MCV RBC AUTO: 83.5 FL (ref 79–97)
MCV RBC AUTO: 83.9 FL (ref 79–97)
MCV RBC AUTO: 85 FL (ref 79–97)
MONOCYTES # BLD AUTO: 0.3 10*3/MM3 (ref 0.1–0.9)
MONOCYTES # BLD AUTO: 0.4 10*3/MM3 (ref 0.1–0.9)
MONOCYTES # BLD AUTO: 0.4 10*3/MM3 (ref 0.1–0.9)
MONOCYTES # BLD AUTO: 0.5 10*3/MM3 (ref 0.1–0.9)
MONOCYTES # BLD AUTO: 0.5 10*3/MM3 (ref 0.1–0.9)
MONOCYTES # BLD AUTO: 0.6 10*3/MM3 (ref 0.1–0.9)
MONOCYTES NFR BLD AUTO: 10.4 % (ref 5–12)
MONOCYTES NFR BLD AUTO: 5 % (ref 5–12)
MONOCYTES NFR BLD AUTO: 6.4 % (ref 5–12)
MONOCYTES NFR BLD AUTO: 7.3 % (ref 5–12)
MONOCYTES NFR BLD AUTO: 7.5 % (ref 5–12)
MONOCYTES NFR BLD AUTO: 7.6 % (ref 5–12)
MONOCYTES NFR BLD AUTO: 7.7 % (ref 5–12)
MONOCYTES NFR BLD AUTO: 8.9 % (ref 5–12)
MONOCYTES NFR BLD AUTO: 9.1 % (ref 5–12)
MONOCYTES NFR BLD AUTO: 9.4 % (ref 5–12)
MONOCYTES NFR BLD AUTO: 9.5 % (ref 5–12)
NEUTROPHILS NFR BLD AUTO: 4.1 10*3/MM3 (ref 1.7–7)
NEUTROPHILS NFR BLD AUTO: 4.3 10*3/MM3 (ref 1.7–7)
NEUTROPHILS NFR BLD AUTO: 4.4 10*3/MM3 (ref 1.7–7)
NEUTROPHILS NFR BLD AUTO: 4.4 10*3/MM3 (ref 1.7–7)
NEUTROPHILS NFR BLD AUTO: 4.6 10*3/MM3 (ref 1.7–7)
NEUTROPHILS NFR BLD AUTO: 4.6 10*3/MM3 (ref 1.7–7)
NEUTROPHILS NFR BLD AUTO: 4.7 10*3/MM3 (ref 1.7–7)
NEUTROPHILS NFR BLD AUTO: 5.1 10*3/MM3 (ref 1.7–7)
NEUTROPHILS NFR BLD AUTO: 5.2 10*3/MM3 (ref 1.7–7)
NEUTROPHILS NFR BLD AUTO: 5.4 10*3/MM3 (ref 1.7–7)
NEUTROPHILS NFR BLD AUTO: 5.7 10*3/MM3 (ref 1.7–7)
NEUTROPHILS NFR BLD AUTO: 5.8 10*3/MM3 (ref 1.7–7)
NEUTROPHILS NFR BLD AUTO: 6 10*3/MM3 (ref 1.7–7)
NEUTROPHILS NFR BLD AUTO: 69.5 % (ref 42.7–76)
NEUTROPHILS NFR BLD AUTO: 69.5 % (ref 42.7–76)
NEUTROPHILS NFR BLD AUTO: 70.7 % (ref 42.7–76)
NEUTROPHILS NFR BLD AUTO: 71 % (ref 42.7–76)
NEUTROPHILS NFR BLD AUTO: 71 % (ref 42.7–76)
NEUTROPHILS NFR BLD AUTO: 71.5 % (ref 42.7–76)
NEUTROPHILS NFR BLD AUTO: 72.1 % (ref 42.7–76)
NEUTROPHILS NFR BLD AUTO: 72.1 % (ref 42.7–76)
NEUTROPHILS NFR BLD AUTO: 74 % (ref 42.7–76)
NEUTROPHILS NFR BLD AUTO: 75 % (ref 42.7–76)
NEUTROPHILS NFR BLD AUTO: 75.4 % (ref 42.7–76)
NEUTROPHILS NFR BLD AUTO: 77.1 % (ref 42.7–76)
NEUTROPHILS NFR BLD AUTO: 84.5 % (ref 42.7–76)
NITRITE UR QL STRIP: NEGATIVE
NRBC BLD AUTO-RTO: 0 /100 WBC (ref 0–0.2)
NRBC BLD AUTO-RTO: 0 /100 WBC (ref 0–0.2)
NRBC BLD AUTO-RTO: 0.1 /100 WBC (ref 0–0.2)
NRBC BLD AUTO-RTO: 0.2 /100 WBC (ref 0–0.2)
NRBC BLD AUTO-RTO: 0.2 /100 WBC (ref 0–0.2)
NRBC BLD AUTO-RTO: 0.3 /100 WBC (ref 0–0.2)
NT-PROBNP SERPL-MCNC: ABNORMAL PG/ML (ref 0–1800)
PH UR STRIP.AUTO: <=5 [PH] (ref 5–8)
PLATELET # BLD AUTO: 188 10*3/MM3 (ref 140–450)
PLATELET # BLD AUTO: 195 10*3/MM3 (ref 140–450)
PLATELET # BLD AUTO: 204 10*3/MM3 (ref 140–450)
PLATELET # BLD AUTO: 207 10*3/MM3 (ref 140–450)
PLATELET # BLD AUTO: 209 10*3/MM3 (ref 140–450)
PLATELET # BLD AUTO: 216 10*3/MM3 (ref 140–450)
PLATELET # BLD AUTO: 218 10*3/MM3 (ref 140–450)
PLATELET # BLD AUTO: 229 10*3/MM3 (ref 140–450)
PLATELET # BLD AUTO: 237 10*3/MM3 (ref 140–450)
PLATELET # BLD AUTO: 240 10*3/MM3 (ref 140–450)
PLATELET # BLD AUTO: 262 10*3/MM3 (ref 140–450)
PLATELET # BLD AUTO: 277 10*3/MM3 (ref 140–450)
PLATELET # BLD AUTO: 280 10*3/MM3 (ref 140–450)
PMV BLD AUTO: 7.5 FL (ref 6–12)
PMV BLD AUTO: 8.2 FL (ref 6–12)
PMV BLD AUTO: 8.3 FL (ref 6–12)
PMV BLD AUTO: 8.3 FL (ref 6–12)
PMV BLD AUTO: 8.4 FL (ref 6–12)
PMV BLD AUTO: 8.5 FL (ref 6–12)
PMV BLD AUTO: 8.6 FL (ref 6–12)
PMV BLD AUTO: 8.8 FL (ref 6–12)
PMV BLD AUTO: 8.9 FL (ref 6–12)
PMV BLD AUTO: 8.9 FL (ref 6–12)
PMV BLD AUTO: 9.9 FL (ref 6–12)
POTASSIUM SERPL-SCNC: 3.6 MMOL/L (ref 3.5–5.2)
POTASSIUM SERPL-SCNC: 3.7 MMOL/L (ref 3.5–5.2)
POTASSIUM SERPL-SCNC: 3.7 MMOL/L (ref 3.5–5.2)
POTASSIUM SERPL-SCNC: 3.9 MMOL/L (ref 3.5–5.2)
POTASSIUM SERPL-SCNC: 4 MMOL/L (ref 3.5–5.2)
POTASSIUM SERPL-SCNC: 4.1 MMOL/L (ref 3.5–5.2)
POTASSIUM SERPL-SCNC: 4.5 MMOL/L (ref 3.5–5.2)
POTASSIUM SERPL-SCNC: 4.5 MMOL/L (ref 3.5–5.2)
POTASSIUM SERPL-SCNC: 4.6 MMOL/L (ref 3.5–5.2)
POTASSIUM SERPL-SCNC: 4.7 MMOL/L (ref 3.5–5.2)
POTASSIUM SERPL-SCNC: 4.8 MMOL/L (ref 3.5–5.2)
POTASSIUM SERPL-SCNC: 4.8 MMOL/L (ref 3.5–5.2)
POTASSIUM SERPL-SCNC: 4.9 MMOL/L (ref 3.5–5.2)
POTASSIUM SERPL-SCNC: 5.1 MMOL/L (ref 3.5–5.2)
POTASSIUM SERPL-SCNC: 5.4 MMOL/L (ref 3.5–5.2)
PROCALCITONIN SERPL-MCNC: 0.09 NG/ML (ref 0–0.25)
PROT SERPL-MCNC: 6.1 G/DL (ref 6–8.5)
PROT SERPL-MCNC: 6.7 G/DL (ref 6–8.5)
PROT SERPL-MCNC: 6.8 G/DL (ref 6–8.5)
PROT SERPL-MCNC: 6.9 G/DL (ref 6–8.5)
PROT SERPL-MCNC: 7.5 G/DL (ref 6–8.5)
PROT SERPL-MCNC: 7.7 G/DL (ref 6–8.5)
PROT UR QL STRIP: NEGATIVE
PROTHROMBIN TIME: 14 SECONDS (ref 9.6–11.7)
PROTHROMBIN TIME: 14.4 SECONDS (ref 9.6–11.7)
QT INTERVAL: 401 MS
QT INTERVAL: 444 MS
RBC # BLD AUTO: 3.05 10*6/MM3 (ref 4.14–5.8)
RBC # BLD AUTO: 3.06 10*6/MM3 (ref 4.14–5.8)
RBC # BLD AUTO: 3.12 10*6/MM3 (ref 4.14–5.8)
RBC # BLD AUTO: 3.94 10*6/MM3 (ref 4.14–5.8)
RBC # BLD AUTO: 4.58 10*6/MM3 (ref 4.14–5.8)
RBC # BLD AUTO: 4.59 10*6/MM3 (ref 4.14–5.8)
RBC # BLD AUTO: 4.78 10*6/MM3 (ref 4.14–5.8)
RBC # BLD AUTO: 4.78 10*6/MM3 (ref 4.14–5.8)
RBC # BLD AUTO: 4.81 10*6/MM3 (ref 4.14–5.8)
RBC # BLD AUTO: 4.9 10*6/MM3 (ref 4.14–5.8)
RBC # BLD AUTO: 4.98 10*6/MM3 (ref 4.14–5.8)
RBC # BLD AUTO: 5 10*6/MM3 (ref 4.14–5.8)
RBC # BLD AUTO: 5.01 10*6/MM3 (ref 4.14–5.8)
RBC # UR: ABNORMAL /HPF
REF LAB TEST METHOD: ABNORMAL
RH BLD: POSITIVE
RH BLD: POSITIVE
RHINOVIRUS RNA SPEC NAA+PROBE: NOT DETECTED
RSV RNA NPH QL NAA+NON-PROBE: NOT DETECTED
SARS-COV-2 ORF1AB RESP QL NAA+PROBE: NOT DETECTED
SARS-COV-2 RNA NPH QL NAA+NON-PROBE: NOT DETECTED
SARS-COV-2 RNA PNL SPEC NAA+PROBE: NOT DETECTED
SODIUM SERPL-SCNC: 134 MMOL/L (ref 136–145)
SODIUM SERPL-SCNC: 134 MMOL/L (ref 136–145)
SODIUM SERPL-SCNC: 135 MMOL/L (ref 136–145)
SODIUM SERPL-SCNC: 135 MMOL/L (ref 136–145)
SODIUM SERPL-SCNC: 136 MMOL/L (ref 136–145)
SODIUM SERPL-SCNC: 137 MMOL/L (ref 136–145)
SODIUM SERPL-SCNC: 138 MMOL/L (ref 136–145)
SODIUM SERPL-SCNC: 138 MMOL/L (ref 136–145)
SODIUM SERPL-SCNC: 139 MMOL/L (ref 136–145)
SODIUM SERPL-SCNC: 140 MMOL/L (ref 136–145)
SODIUM SERPL-SCNC: 141 MMOL/L (ref 136–145)
SODIUM SERPL-SCNC: 143 MMOL/L (ref 136–145)
SODIUM SERPL-SCNC: 144 MMOL/L (ref 136–145)
SODIUM UR-SCNC: <20 MMOL/L
SP GR UR STRIP: 1.01 (ref 1–1.03)
SQUAMOUS #/AREA URNS HPF: ABNORMAL /HPF
T&S EXPIRATION DATE: NORMAL
T&S EXPIRATION DATE: NORMAL
TIBC SERPL-MCNC: 276 MCG/DL (ref 298–536)
TRANSFERRIN SERPL-MCNC: 185 MG/DL (ref 200–360)
TRIGL SERPL-MCNC: 73 MG/DL (ref 0–150)
TROPONIN T SERPL-MCNC: 0.06 NG/ML (ref 0–0.03)
TROPONIN T SERPL-MCNC: 0.08 NG/ML (ref 0–0.03)
TROPONIN T SERPL-MCNC: 0.09 NG/ML (ref 0–0.03)
TROPONIN T SERPL-MCNC: 0.1 NG/ML (ref 0–0.03)
TROPONIN T SERPL-MCNC: 0.12 NG/ML (ref 0–0.03)
TROPONIN T SERPL-MCNC: 0.12 NG/ML (ref 0–0.03)
UROBILINOGEN UR QL STRIP: ABNORMAL
VLDLC SERPL-MCNC: 16 MG/DL (ref 5–40)
WBC # BLD AUTO: 5.7 10*3/MM3 (ref 3.4–10.8)
WBC # BLD AUTO: 5.8 10*3/MM3 (ref 3.4–10.8)
WBC # BLD AUTO: 6.1 10*3/MM3 (ref 3.4–10.8)
WBC # BLD AUTO: 6.1 10*3/MM3 (ref 3.4–10.8)
WBC # BLD AUTO: 6.2 10*3/MM3 (ref 3.4–10.8)
WBC # BLD AUTO: 6.4 10*3/MM3 (ref 3.4–10.8)
WBC # BLD AUTO: 6.4 10*3/MM3 (ref 3.4–10.8)
WBC # BLD AUTO: 6.8 10*3/MM3 (ref 3.4–10.8)
WBC # BLD AUTO: 7 10*3/MM3 (ref 3.4–10.8)
WBC # BLD AUTO: 7.1 10*3/MM3 (ref 3.4–10.8)
WBC # BLD AUTO: 7.4 10*3/MM3 (ref 3.4–10.8)
WBC # BLD AUTO: 8.4 10*3/MM3 (ref 3.4–10.8)
WBC # BLD AUTO: 8.4 10*3/MM3 (ref 3.4–10.8)
WBC UR QL AUTO: ABNORMAL /HPF
WHOLE BLOOD HOLD SPECIMEN: NORMAL

## 2021-01-01 PROCEDURE — 85014 HEMATOCRIT: CPT | Performed by: FAMILY MEDICINE

## 2021-01-01 PROCEDURE — 86850 RBC ANTIBODY SCREEN: CPT | Performed by: EMERGENCY MEDICINE

## 2021-01-01 PROCEDURE — 85025 COMPLETE CBC W/AUTO DIFF WBC: CPT | Performed by: NURSE PRACTITIONER

## 2021-01-01 PROCEDURE — 85025 COMPLETE CBC W/AUTO DIFF WBC: CPT | Performed by: INTERNAL MEDICINE

## 2021-01-01 PROCEDURE — A9270 NON-COVERED ITEM OR SERVICE: HCPCS | Performed by: INTERNAL MEDICINE

## 2021-01-01 PROCEDURE — 99285 EMERGENCY DEPT VISIT HI MDM: CPT

## 2021-01-01 PROCEDURE — 97162 PT EVAL MOD COMPLEX 30 MIN: CPT

## 2021-01-01 PROCEDURE — 80048 BASIC METABOLIC PNL TOTAL CA: CPT | Performed by: FAMILY MEDICINE

## 2021-01-01 PROCEDURE — G0378 HOSPITAL OBSERVATION PER HR: HCPCS

## 2021-01-01 PROCEDURE — 83036 HEMOGLOBIN GLYCOSYLATED A1C: CPT

## 2021-01-01 PROCEDURE — 81001 URINALYSIS AUTO W/SCOPE: CPT | Performed by: NURSE PRACTITIONER

## 2021-01-01 PROCEDURE — 45382 COLONOSCOPY W/CONTROL BLEED: CPT | Performed by: INTERNAL MEDICINE

## 2021-01-01 PROCEDURE — 82962 GLUCOSE BLOOD TEST: CPT

## 2021-01-01 PROCEDURE — 92610 EVALUATE SWALLOWING FUNCTION: CPT

## 2021-01-01 PROCEDURE — 84484 ASSAY OF TROPONIN QUANT: CPT | Performed by: INTERNAL MEDICINE

## 2021-01-01 PROCEDURE — 63710000001 FAMOTIDINE 20 MG TABLET: Performed by: INTERNAL MEDICINE

## 2021-01-01 PROCEDURE — 82150 ASSAY OF AMYLASE: CPT | Performed by: INTERNAL MEDICINE

## 2021-01-01 PROCEDURE — 83540 ASSAY OF IRON: CPT | Performed by: NURSE PRACTITIONER

## 2021-01-01 PROCEDURE — 86901 BLOOD TYPING SEROLOGIC RH(D): CPT | Performed by: EMERGENCY MEDICINE

## 2021-01-01 PROCEDURE — 25010000002 METOCLOPRAMIDE PER 10 MG: Performed by: NURSE PRACTITIONER

## 2021-01-01 PROCEDURE — 85610 PROTHROMBIN TIME: CPT | Performed by: INTERNAL MEDICINE

## 2021-01-01 PROCEDURE — 96375 TX/PRO/DX INJ NEW DRUG ADDON: CPT

## 2021-01-01 PROCEDURE — 80053 COMPREHEN METABOLIC PANEL: CPT | Performed by: EMERGENCY MEDICINE

## 2021-01-01 PROCEDURE — 85014 HEMATOCRIT: CPT | Performed by: INTERNAL MEDICINE

## 2021-01-01 PROCEDURE — 82550 ASSAY OF CK (CPK): CPT | Performed by: INTERNAL MEDICINE

## 2021-01-01 PROCEDURE — 70450 CT HEAD/BRAIN W/O DYE: CPT

## 2021-01-01 PROCEDURE — 85025 COMPLETE CBC W/AUTO DIFF WBC: CPT | Performed by: FAMILY MEDICINE

## 2021-01-01 PROCEDURE — 63710000001 ESCITALOPRAM 10 MG TABLET: Performed by: INTERNAL MEDICINE

## 2021-01-01 PROCEDURE — 85018 HEMOGLOBIN: CPT | Performed by: FAMILY MEDICINE

## 2021-01-01 PROCEDURE — 80048 BASIC METABOLIC PNL TOTAL CA: CPT | Performed by: INTERNAL MEDICINE

## 2021-01-01 PROCEDURE — 99222 1ST HOSP IP/OBS MODERATE 55: CPT | Performed by: INTERNAL MEDICINE

## 2021-01-01 PROCEDURE — 63710000001 LEVETIRACETAM XR 500 MG TABLET SUSTAINED-RELEASE 24 HOUR: Performed by: INTERNAL MEDICINE

## 2021-01-01 PROCEDURE — 63710000001 INSULIN LISPRO (HUMAN) PER 5 UNITS: Performed by: INTERNAL MEDICINE

## 2021-01-01 PROCEDURE — 71045 X-RAY EXAM CHEST 1 VIEW: CPT

## 2021-01-01 PROCEDURE — 25010000002 IRON SUCROSE PER 1 MG: Performed by: NURSE PRACTITIONER

## 2021-01-01 PROCEDURE — 80053 COMPREHEN METABOLIC PANEL: CPT | Performed by: NURSE PRACTITIONER

## 2021-01-01 PROCEDURE — U0005 INFEC AGEN DETEC AMPLI PROBE: HCPCS | Performed by: NURSE PRACTITIONER

## 2021-01-01 PROCEDURE — 84484 ASSAY OF TROPONIN QUANT: CPT | Performed by: EMERGENCY MEDICINE

## 2021-01-01 PROCEDURE — 74177 CT ABD & PELVIS W/CONTRAST: CPT

## 2021-01-01 PROCEDURE — 99223 1ST HOSP IP/OBS HIGH 75: CPT | Mod: 25 | Performed by: INTERNAL MEDICINE

## 2021-01-01 PROCEDURE — 99212 OFFICE O/P EST SF 10 MIN: CPT | Mod: 25 | Performed by: INTERNAL MEDICINE

## 2021-01-01 PROCEDURE — 25010000002 SULFUR HEXAFLUORIDE MICROSPH 60.7-25 MG RECONSTITUTED SUSPENSION: Performed by: FAMILY MEDICINE

## 2021-01-01 PROCEDURE — 99284 EMERGENCY DEPT VISIT MOD MDM: CPT

## 2021-01-01 PROCEDURE — 25010000002 PIPERACILLIN SOD-TAZOBACTAM PER 1 G: Performed by: INTERNAL MEDICINE

## 2021-01-01 PROCEDURE — 83735 ASSAY OF MAGNESIUM: CPT | Performed by: INTERNAL MEDICINE

## 2021-01-01 PROCEDURE — 86901 BLOOD TYPING SEROLOGIC RH(D): CPT | Performed by: NURSE PRACTITIONER

## 2021-01-01 PROCEDURE — 25010000002 EPINEPHRINE 1 MG/10ML SOLUTION PREFILLED SYRINGE: Performed by: INTERNAL MEDICINE

## 2021-01-01 PROCEDURE — 85018 HEMOGLOBIN: CPT | Performed by: INTERNAL MEDICINE

## 2021-01-01 PROCEDURE — 87040 BLOOD CULTURE FOR BACTERIA: CPT | Performed by: EMERGENCY MEDICINE

## 2021-01-01 PROCEDURE — 80061 LIPID PANEL: CPT

## 2021-01-01 PROCEDURE — 97530 THERAPEUTIC ACTIVITIES: CPT

## 2021-01-01 PROCEDURE — 25010000002 FUROSEMIDE PER 20 MG: Performed by: INTERNAL MEDICINE

## 2021-01-01 PROCEDURE — 96374 THER/PROPH/DIAG INJ IV PUSH: CPT

## 2021-01-01 PROCEDURE — 92526 ORAL FUNCTION THERAPY: CPT

## 2021-01-01 PROCEDURE — 86900 BLOOD TYPING SEROLOGIC ABO: CPT | Performed by: NURSE PRACTITIONER

## 2021-01-01 PROCEDURE — 93306 TTE W/DOPPLER COMPLETE: CPT | Performed by: INTERNAL MEDICINE

## 2021-01-01 PROCEDURE — 63710000001 BISOPROLOL 5 MG TABLET: Performed by: INTERNAL MEDICINE

## 2021-01-01 PROCEDURE — 80053 COMPREHEN METABOLIC PANEL: CPT | Performed by: INTERNAL MEDICINE

## 2021-01-01 PROCEDURE — 80053 COMPREHEN METABOLIC PANEL: CPT

## 2021-01-01 PROCEDURE — 25010000002 ENOXAPARIN PER 10 MG: Performed by: FAMILY MEDICINE

## 2021-01-01 PROCEDURE — 25010000002 IRON SUCROSE PER 1 MG: Performed by: INTERNAL MEDICINE

## 2021-01-01 PROCEDURE — 63710000001 INSULIN LISPRO (HUMAN) PER 5 UNITS: Performed by: FAMILY MEDICINE

## 2021-01-01 PROCEDURE — 87635 SARS-COV-2 COVID-19 AMP PRB: CPT | Performed by: FAMILY MEDICINE

## 2021-01-01 PROCEDURE — 84145 PROCALCITONIN (PCT): CPT | Performed by: EMERGENCY MEDICINE

## 2021-01-01 PROCEDURE — 84300 ASSAY OF URINE SODIUM: CPT | Performed by: INTERNAL MEDICINE

## 2021-01-01 PROCEDURE — 86901 BLOOD TYPING SEROLOGIC RH(D): CPT

## 2021-01-01 PROCEDURE — 0 IOPAMIDOL PER 1 ML: Performed by: NURSE PRACTITIONER

## 2021-01-01 PROCEDURE — 63710000001 FINASTERIDE 5 MG TABLET: Performed by: INTERNAL MEDICINE

## 2021-01-01 PROCEDURE — 0202U NFCT DS 22 TRGT SARS-COV-2: CPT | Performed by: EMERGENCY MEDICINE

## 2021-01-01 PROCEDURE — 86850 RBC ANTIBODY SCREEN: CPT | Performed by: NURSE PRACTITIONER

## 2021-01-01 PROCEDURE — 99232 SBSQ HOSP IP/OBS MODERATE 35: CPT | Performed by: INTERNAL MEDICINE

## 2021-01-01 PROCEDURE — 74176 CT ABD & PELVIS W/O CONTRAST: CPT

## 2021-01-01 PROCEDURE — 25010000002 PROPOFOL 10 MG/ML EMULSION: Performed by: ANESTHESIOLOGY

## 2021-01-01 PROCEDURE — 74230 X-RAY XM SWLNG FUNCJ C+: CPT

## 2021-01-01 PROCEDURE — P9612 CATHETERIZE FOR URINE SPEC: HCPCS

## 2021-01-01 PROCEDURE — 83880 ASSAY OF NATRIURETIC PEPTIDE: CPT | Performed by: EMERGENCY MEDICINE

## 2021-01-01 PROCEDURE — 84466 ASSAY OF TRANSFERRIN: CPT | Performed by: NURSE PRACTITIONER

## 2021-01-01 PROCEDURE — 86900 BLOOD TYPING SEROLOGIC ABO: CPT | Performed by: EMERGENCY MEDICINE

## 2021-01-01 PROCEDURE — 93005 ELECTROCARDIOGRAM TRACING: CPT | Performed by: EMERGENCY MEDICINE

## 2021-01-01 PROCEDURE — 99214 OFFICE O/P EST MOD 30 MIN: CPT | Performed by: NURSE PRACTITIONER

## 2021-01-01 PROCEDURE — U0004 COV-19 TEST NON-CDC HGH THRU: HCPCS | Performed by: NURSE PRACTITIONER

## 2021-01-01 PROCEDURE — 96361 HYDRATE IV INFUSION ADD-ON: CPT

## 2021-01-01 PROCEDURE — 86900 BLOOD TYPING SEROLOGIC ABO: CPT

## 2021-01-01 PROCEDURE — 83690 ASSAY OF LIPASE: CPT | Performed by: NURSE PRACTITIONER

## 2021-01-01 PROCEDURE — 36415 COLL VENOUS BLD VENIPUNCTURE: CPT | Performed by: EMERGENCY MEDICINE

## 2021-01-01 PROCEDURE — 84484 ASSAY OF TROPONIN QUANT: CPT | Performed by: NURSE PRACTITIONER

## 2021-01-01 PROCEDURE — 83690 ASSAY OF LIPASE: CPT | Performed by: INTERNAL MEDICINE

## 2021-01-01 PROCEDURE — 93306 TTE W/DOPPLER COMPLETE: CPT

## 2021-01-01 PROCEDURE — 85025 COMPLETE CBC W/AUTO DIFF WBC: CPT | Performed by: EMERGENCY MEDICINE

## 2021-01-01 PROCEDURE — A9270 NON-COVERED ITEM OR SERVICE: HCPCS | Performed by: FAMILY MEDICINE

## 2021-01-01 PROCEDURE — 92611 MOTION FLUOROSCOPY/SWALLOW: CPT

## 2021-01-01 PROCEDURE — 85610 PROTHROMBIN TIME: CPT | Performed by: EMERGENCY MEDICINE

## 2021-01-01 PROCEDURE — 83036 HEMOGLOBIN GLYCOSYLATED A1C: CPT | Performed by: INTERNAL MEDICINE

## 2021-01-01 PROCEDURE — 36415 COLL VENOUS BLD VENIPUNCTURE: CPT

## 2021-01-01 PROCEDURE — 93005 ELECTROCARDIOGRAM TRACING: CPT | Performed by: NURSE PRACTITIONER

## 2021-01-01 PROCEDURE — 85730 THROMBOPLASTIN TIME PARTIAL: CPT | Performed by: EMERGENCY MEDICINE

## 2021-01-01 DEVICE — DEV CLIP HEMO DURACLIP REPOSTNG COLONOSCOPE 16MM 235CM: Type: IMPLANTABLE DEVICE | Site: SIGMOID COLON | Status: FUNCTIONAL

## 2021-01-01 RX ORDER — DEXTROSE AND SODIUM CHLORIDE 5; .45 G/100ML; G/100ML
125 INJECTION, SOLUTION INTRAVENOUS CONTINUOUS
Status: DISCONTINUED | OUTPATIENT
Start: 2021-01-01 | End: 2021-01-01 | Stop reason: HOSPADM

## 2021-01-01 RX ORDER — ACETAMINOPHEN 325 MG/1
650 TABLET ORAL EVERY 4 HOURS PRN
Status: DISCONTINUED | OUTPATIENT
Start: 2021-01-01 | End: 2021-01-01 | Stop reason: HOSPADM

## 2021-01-01 RX ORDER — SODIUM CHLORIDE 0.9 % (FLUSH) 0.9 %
3-10 SYRINGE (ML) INJECTION AS NEEDED
Status: DISCONTINUED | OUTPATIENT
Start: 2021-01-01 | End: 2021-01-01 | Stop reason: HOSPADM

## 2021-01-01 RX ORDER — CHOLECALCIFEROL (VITAMIN D3) 125 MCG
5 CAPSULE ORAL NIGHTLY PRN
Status: DISCONTINUED | OUTPATIENT
Start: 2021-01-01 | End: 2021-01-01 | Stop reason: HOSPADM

## 2021-01-01 RX ORDER — DEXTROSE MONOHYDRATE 25 G/50ML
25 INJECTION, SOLUTION INTRAVENOUS
Status: DISCONTINUED | OUTPATIENT
Start: 2021-01-01 | End: 2021-01-01

## 2021-01-01 RX ORDER — DEXTROSE MONOHYDRATE 25 G/50ML
50 INJECTION, SOLUTION INTRAVENOUS ONCE
Status: COMPLETED | OUTPATIENT
Start: 2021-01-01 | End: 2021-01-01

## 2021-01-01 RX ORDER — ATORVASTATIN CALCIUM 10 MG/1
10 TABLET, FILM COATED ORAL NIGHTLY
Status: DISCONTINUED | OUTPATIENT
Start: 2021-01-01 | End: 2021-01-01 | Stop reason: HOSPADM

## 2021-01-01 RX ORDER — ONDANSETRON 2 MG/ML
4 INJECTION INTRAMUSCULAR; INTRAVENOUS EVERY 6 HOURS PRN
Status: DISCONTINUED | OUTPATIENT
Start: 2021-01-01 | End: 2021-01-01 | Stop reason: HOSPADM

## 2021-01-01 RX ORDER — METOPROLOL TARTRATE 5 MG/5ML
10 INJECTION INTRAVENOUS EVERY 6 HOURS PRN
Status: DISCONTINUED | OUTPATIENT
Start: 2021-01-01 | End: 2021-01-01 | Stop reason: HOSPADM

## 2021-01-01 RX ORDER — PANTOPRAZOLE SODIUM 40 MG/1
40 TABLET, DELAYED RELEASE ORAL DAILY
Start: 2021-01-01

## 2021-01-01 RX ORDER — METOCLOPRAMIDE HYDROCHLORIDE 5 MG/ML
10 INJECTION INTRAMUSCULAR; INTRAVENOUS ONCE
Status: COMPLETED | OUTPATIENT
Start: 2021-01-01 | End: 2021-01-01

## 2021-01-01 RX ORDER — BISOPROLOL FUMARATE 5 MG/1
5 TABLET, FILM COATED ORAL DAILY
Status: DISCONTINUED | OUTPATIENT
Start: 2021-01-01 | End: 2021-01-01 | Stop reason: HOSPADM

## 2021-01-01 RX ORDER — SODIUM CHLORIDE 9 MG/ML
50 INJECTION, SOLUTION INTRAVENOUS CONTINUOUS
Status: DISCONTINUED | OUTPATIENT
Start: 2021-01-01 | End: 2021-01-01

## 2021-01-01 RX ORDER — ACETAMINOPHEN 325 MG/1
650 TABLET ORAL EVERY 4 HOURS PRN
Start: 2021-01-01

## 2021-01-01 RX ORDER — INSULIN LISPRO 100 [IU]/ML
0-7 INJECTION, SOLUTION INTRAVENOUS; SUBCUTANEOUS AS NEEDED
Status: DISCONTINUED | OUTPATIENT
Start: 2021-01-01 | End: 2021-01-01

## 2021-01-01 RX ORDER — DEXTROSE AND SODIUM CHLORIDE 5; .45 G/100ML; G/100ML
125 INJECTION, SOLUTION INTRAVENOUS CONTINUOUS
Status: DISCONTINUED | OUTPATIENT
Start: 2021-01-01 | End: 2021-01-01

## 2021-01-01 RX ORDER — PEG-3350, SODIUM SULFATE, SODIUM CHLORIDE, POTASSIUM CHLORIDE, SODIUM ASCORBATE AND ASCORBIC ACID 7.5-2.691G
1000 KIT ORAL EVERY 12 HOURS
Status: DISPENSED | OUTPATIENT
Start: 2021-01-01 | End: 2021-01-01

## 2021-01-01 RX ORDER — SODIUM CHLORIDE 0.9 % (FLUSH) 0.9 %
10 SYRINGE (ML) INJECTION AS NEEDED
Status: DISCONTINUED | OUTPATIENT
Start: 2021-01-01 | End: 2021-01-01 | Stop reason: HOSPADM

## 2021-01-01 RX ORDER — INSULIN LISPRO 100 [IU]/ML
0-7 INJECTION, SOLUTION INTRAVENOUS; SUBCUTANEOUS
Status: DISCONTINUED | OUTPATIENT
Start: 2021-01-01 | End: 2021-01-01

## 2021-01-01 RX ORDER — DEXTROSE MONOHYDRATE 25 G/50ML
25 INJECTION, SOLUTION INTRAVENOUS ONCE
Status: DISCONTINUED | OUTPATIENT
Start: 2021-01-01 | End: 2021-01-01

## 2021-01-01 RX ORDER — ESCITALOPRAM OXALATE 10 MG/1
10 TABLET ORAL DAILY
Status: DISCONTINUED | OUTPATIENT
Start: 2021-01-01 | End: 2021-01-01 | Stop reason: HOSPADM

## 2021-01-01 RX ORDER — LABETALOL HYDROCHLORIDE 5 MG/ML
20 INJECTION, SOLUTION INTRAVENOUS EVERY 6 HOURS PRN
Status: DISCONTINUED | OUTPATIENT
Start: 2021-01-01 | End: 2021-01-01 | Stop reason: HOSPADM

## 2021-01-01 RX ORDER — SODIUM CHLORIDE 0.9 % (FLUSH) 0.9 %
3 SYRINGE (ML) INJECTION EVERY 12 HOURS SCHEDULED
Status: DISCONTINUED | OUTPATIENT
Start: 2021-01-01 | End: 2021-01-01 | Stop reason: HOSPADM

## 2021-01-01 RX ORDER — FAMOTIDINE 20 MG/1
40 TABLET, FILM COATED ORAL DAILY
Status: DISCONTINUED | OUTPATIENT
Start: 2021-01-01 | End: 2021-01-01 | Stop reason: HOSPADM

## 2021-01-01 RX ORDER — FUROSEMIDE 40 MG/1
40 TABLET ORAL DAILY
Status: DISCONTINUED | OUTPATIENT
Start: 2021-01-01 | End: 2021-01-01 | Stop reason: HOSPADM

## 2021-01-01 RX ORDER — LEVETIRACETAM 500 MG/1
2000 TABLET, EXTENDED RELEASE ORAL DAILY
Status: DISCONTINUED | OUTPATIENT
Start: 2021-01-01 | End: 2021-01-01 | Stop reason: HOSPADM

## 2021-01-01 RX ORDER — ONDANSETRON 2 MG/ML
4 INJECTION INTRAMUSCULAR; INTRAVENOUS ONCE AS NEEDED
Status: DISCONTINUED | OUTPATIENT
Start: 2021-01-01 | End: 2021-01-01 | Stop reason: HOSPADM

## 2021-01-01 RX ORDER — PANTOPRAZOLE SODIUM 40 MG/1
40 TABLET, DELAYED RELEASE ORAL
Refills: 3 | Status: DISCONTINUED | OUTPATIENT
Start: 2021-01-01 | End: 2021-01-01 | Stop reason: HOSPADM

## 2021-01-01 RX ORDER — LIDOCAINE HYDROCHLORIDE 10 MG/ML
INJECTION, SOLUTION EPIDURAL; INFILTRATION; INTRACAUDAL; PERINEURAL AS NEEDED
Status: DISCONTINUED | OUTPATIENT
Start: 2021-01-01 | End: 2021-01-01 | Stop reason: SURG

## 2021-01-01 RX ORDER — PHENYLEPHRINE HCL IN 0.9% NACL 1 MG/10 ML
SYRINGE (ML) INTRAVENOUS AS NEEDED
Status: DISCONTINUED | OUTPATIENT
Start: 2021-01-01 | End: 2021-01-01 | Stop reason: SURG

## 2021-01-01 RX ORDER — ONDANSETRON 4 MG/1
4 TABLET, FILM COATED ORAL EVERY 6 HOURS PRN
Status: DISCONTINUED | OUTPATIENT
Start: 2021-01-01 | End: 2021-01-01 | Stop reason: HOSPADM

## 2021-01-01 RX ORDER — PANTOPRAZOLE SODIUM 40 MG/1
40 TABLET, DELAYED RELEASE ORAL
Status: DISCONTINUED | OUTPATIENT
Start: 2021-01-01 | End: 2021-01-01 | Stop reason: HOSPADM

## 2021-01-01 RX ORDER — PROPOFOL 10 MG/ML
VIAL (ML) INTRAVENOUS AS NEEDED
Status: DISCONTINUED | OUTPATIENT
Start: 2021-01-01 | End: 2021-01-01 | Stop reason: SURG

## 2021-01-01 RX ORDER — FINASTERIDE 5 MG/1
5 TABLET, FILM COATED ORAL DAILY
Status: DISCONTINUED | OUTPATIENT
Start: 2021-01-01 | End: 2021-01-01 | Stop reason: HOSPADM

## 2021-01-01 RX ORDER — DEXTROSE MONOHYDRATE 25 G/50ML
25 INJECTION, SOLUTION INTRAVENOUS ONCE
Status: COMPLETED | OUTPATIENT
Start: 2021-01-01 | End: 2021-01-01

## 2021-01-01 RX ORDER — INSULIN LISPRO 100 [IU]/ML
0-9 INJECTION, SOLUTION INTRAVENOUS; SUBCUTANEOUS
Status: DISCONTINUED | OUTPATIENT
Start: 2021-01-01 | End: 2021-01-01 | Stop reason: HOSPADM

## 2021-01-01 RX ORDER — EPINEPHRINE 0.1 MG/ML
SYRINGE (ML) INJECTION AS NEEDED
Status: DISCONTINUED | OUTPATIENT
Start: 2021-01-01 | End: 2021-01-01 | Stop reason: HOSPADM

## 2021-01-01 RX ORDER — INSULIN GLARGINE 100 [IU]/ML
20 INJECTION, SOLUTION SUBCUTANEOUS NIGHTLY
COMMUNITY
End: 2021-01-01 | Stop reason: HOSPADM

## 2021-01-01 RX ORDER — CALCIUM CARBONATE 200(500)MG
2 TABLET,CHEWABLE ORAL 3 TIMES DAILY PRN
Status: DISCONTINUED | OUTPATIENT
Start: 2021-01-01 | End: 2021-01-01 | Stop reason: HOSPADM

## 2021-01-01 RX ORDER — PANTOPRAZOLE SODIUM 40 MG/10ML
80 INJECTION, POWDER, LYOPHILIZED, FOR SOLUTION INTRAVENOUS ONCE
Status: COMPLETED | OUTPATIENT
Start: 2021-01-01 | End: 2021-01-01

## 2021-01-01 RX ORDER — SODIUM CHLORIDE 9 MG/ML
100 INJECTION, SOLUTION INTRAVENOUS CONTINUOUS
Status: DISCONTINUED | OUTPATIENT
Start: 2021-01-01 | End: 2021-01-01

## 2021-01-01 RX ORDER — NICOTINE POLACRILEX 4 MG
15 LOZENGE BUCCAL
Status: DISCONTINUED | OUTPATIENT
Start: 2021-01-01 | End: 2021-01-01 | Stop reason: HOSPADM

## 2021-01-01 RX ORDER — BISACODYL 10 MG
10 SUPPOSITORY, RECTAL RECTAL DAILY PRN
Status: DISCONTINUED | OUTPATIENT
Start: 2021-01-01 | End: 2021-01-01 | Stop reason: HOSPADM

## 2021-01-01 RX ORDER — DEXTROSE MONOHYDRATE 25 G/50ML
INJECTION, SOLUTION INTRAVENOUS
Status: COMPLETED
Start: 2021-01-01 | End: 2021-01-01

## 2021-01-01 RX ORDER — FAMOTIDINE 40 MG/1
40 TABLET, FILM COATED ORAL DAILY
Qty: 30 TABLET | Refills: 3 | Status: SHIPPED | OUTPATIENT
Start: 2021-01-01 | End: 2021-01-01 | Stop reason: HOSPADM

## 2021-01-01 RX ORDER — ASPIRIN 81 MG/1
81 TABLET ORAL DAILY
Status: DISCONTINUED | OUTPATIENT
Start: 2021-01-01 | End: 2021-01-01 | Stop reason: HOSPADM

## 2021-01-01 RX ORDER — DEXTROSE MONOHYDRATE 25 G/50ML
25 INJECTION, SOLUTION INTRAVENOUS
Status: DISCONTINUED | OUTPATIENT
Start: 2021-01-01 | End: 2021-01-01 | Stop reason: HOSPADM

## 2021-01-01 RX ORDER — INSULIN LISPRO 100 [IU]/ML
0-9 INJECTION, SOLUTION INTRAVENOUS; SUBCUTANEOUS AS NEEDED
Status: DISCONTINUED | OUTPATIENT
Start: 2021-01-01 | End: 2021-01-01 | Stop reason: HOSPADM

## 2021-01-01 RX ORDER — AMOXICILLIN AND CLAVULANATE POTASSIUM 875; 125 MG/1; MG/1
1 TABLET, FILM COATED ORAL 2 TIMES DAILY
Qty: 10 TABLET | Refills: 0
Start: 2021-01-01 | End: 2021-01-01

## 2021-01-01 RX ORDER — DOCUSATE SODIUM 100 MG/1
100 CAPSULE, LIQUID FILLED ORAL 2 TIMES DAILY PRN
Status: DISCONTINUED | OUTPATIENT
Start: 2021-01-01 | End: 2021-01-01 | Stop reason: HOSPADM

## 2021-01-01 RX ORDER — FUROSEMIDE 10 MG/ML
20 INJECTION INTRAMUSCULAR; INTRAVENOUS ONCE
Status: COMPLETED | OUTPATIENT
Start: 2021-01-01 | End: 2021-01-01

## 2021-01-01 RX ORDER — INSULIN LISPRO 100 [IU]/ML
0-7 INJECTION, SOLUTION INTRAVENOUS; SUBCUTANEOUS EVERY 6 HOURS SCHEDULED
Status: DISCONTINUED | OUTPATIENT
Start: 2021-01-01 | End: 2021-01-01

## 2021-01-01 RX ORDER — ACETAMINOPHEN 160 MG/5ML
650 SOLUTION ORAL EVERY 4 HOURS PRN
Status: DISCONTINUED | OUTPATIENT
Start: 2021-01-01 | End: 2021-01-01 | Stop reason: HOSPADM

## 2021-01-01 RX ORDER — POTASSIUM CHLORIDE 20 MEQ/1
40 TABLET, EXTENDED RELEASE ORAL DAILY
Status: DISCONTINUED | OUTPATIENT
Start: 2021-01-01 | End: 2021-01-01 | Stop reason: HOSPADM

## 2021-01-01 RX ADMIN — Medication 3 ML: at 21:15

## 2021-01-01 RX ADMIN — Medication 3 ML: at 22:32

## 2021-01-01 RX ADMIN — PIPERACILLIN AND TAZOBACTAM 3.38 G: 3; .375 INJECTION, POWDER, LYOPHILIZED, FOR SOLUTION INTRAVENOUS at 05:22

## 2021-01-01 RX ADMIN — ESCITALOPRAM OXALATE 10 MG: 10 TABLET ORAL at 09:14

## 2021-01-01 RX ADMIN — ESCITALOPRAM OXALATE 10 MG: 10 TABLET ORAL at 09:07

## 2021-01-01 RX ADMIN — INSULIN LISPRO 2 UNITS: 100 INJECTION, SOLUTION INTRAVENOUS; SUBCUTANEOUS at 12:18

## 2021-01-01 RX ADMIN — FINASTERIDE 5 MG: 5 TABLET, FILM COATED ORAL at 09:06

## 2021-01-01 RX ADMIN — PIPERACILLIN AND TAZOBACTAM 3.38 G: 3; .375 INJECTION, POWDER, LYOPHILIZED, FOR SOLUTION INTRAVENOUS at 05:20

## 2021-01-01 RX ADMIN — LEVETIRACETAM 2000 MG: 500 TABLET, FILM COATED, EXTENDED RELEASE ORAL at 08:52

## 2021-01-01 RX ADMIN — FUROSEMIDE 40 MG: 40 TABLET ORAL at 09:07

## 2021-01-01 RX ADMIN — Medication 3 ML: at 20:33

## 2021-01-01 RX ADMIN — ATORVASTATIN CALCIUM 10 MG: 10 TABLET, FILM COATED ORAL at 20:34

## 2021-01-01 RX ADMIN — FINASTERIDE 5 MG: 5 TABLET, FILM COATED ORAL at 09:14

## 2021-01-01 RX ADMIN — BISOPROLOL FUMARATE 5 MG: 5 TABLET, FILM COATED ORAL at 09:00

## 2021-01-01 RX ADMIN — PROPOFOL 10 MG: 10 INJECTION, EMULSION INTRAVENOUS at 16:50

## 2021-01-01 RX ADMIN — SODIUM CHLORIDE 125 ML/HR: 900 INJECTION INTRAVENOUS at 04:54

## 2021-01-01 RX ADMIN — ENOXAPARIN SODIUM 90 MG: 100 INJECTION, SOLUTION INTRAVENOUS; SUBCUTANEOUS at 12:50

## 2021-01-01 RX ADMIN — FAMOTIDINE 40 MG: 20 TABLET, FILM COATED ORAL at 09:33

## 2021-01-01 RX ADMIN — DEXTROSE MONOHYDRATE 50 ML: 25 INJECTION, SOLUTION INTRAVENOUS at 23:52

## 2021-01-01 RX ADMIN — LEVETIRACETAM 2000 MG: 500 TABLET, FILM COATED, EXTENDED RELEASE ORAL at 09:04

## 2021-01-01 RX ADMIN — Medication 100 MCG: at 16:48

## 2021-01-01 RX ADMIN — PIPERACILLIN AND TAZOBACTAM 3.38 G: 3; .375 INJECTION, POWDER, LYOPHILIZED, FOR SOLUTION INTRAVENOUS at 15:11

## 2021-01-01 RX ADMIN — POTASSIUM CHLORIDE 40 MEQ: 1500 TABLET, EXTENDED RELEASE ORAL at 09:15

## 2021-01-01 RX ADMIN — ATORVASTATIN CALCIUM 10 MG: 10 TABLET, FILM COATED ORAL at 22:32

## 2021-01-01 RX ADMIN — ESCITALOPRAM OXALATE 10 MG: 10 TABLET ORAL at 09:00

## 2021-01-01 RX ADMIN — ESCITALOPRAM OXALATE 10 MG: 10 TABLET ORAL at 09:33

## 2021-01-01 RX ADMIN — FAMOTIDINE 40 MG: 20 TABLET, FILM COATED ORAL at 09:04

## 2021-01-01 RX ADMIN — BISOPROLOL FUMARATE 5 MG: 5 TABLET, FILM COATED ORAL at 09:15

## 2021-01-01 RX ADMIN — Medication 3 ML: at 08:45

## 2021-01-01 RX ADMIN — POTASSIUM CHLORIDE 40 MEQ: 1500 TABLET, EXTENDED RELEASE ORAL at 09:06

## 2021-01-01 RX ADMIN — Medication 3 ML: at 09:33

## 2021-01-01 RX ADMIN — ENOXAPARIN SODIUM 90 MG: 100 INJECTION, SOLUTION INTRAVENOUS; SUBCUTANEOUS at 12:20

## 2021-01-01 RX ADMIN — INSULIN LISPRO 2 UNITS: 100 INJECTION, SOLUTION INTRAVENOUS; SUBCUTANEOUS at 12:02

## 2021-01-01 RX ADMIN — METOCLOPRAMIDE HYDROCHLORIDE 10 MG: 5 INJECTION INTRAMUSCULAR; INTRAVENOUS at 12:41

## 2021-01-01 RX ADMIN — Medication 3 ML: at 23:25

## 2021-01-01 RX ADMIN — PANTOPRAZOLE SODIUM 80 MG: 40 INJECTION, POWDER, FOR SOLUTION INTRAVENOUS at 21:08

## 2021-01-01 RX ADMIN — BISOPROLOL FUMARATE 5 MG: 5 TABLET, FILM COATED ORAL at 09:33

## 2021-01-01 RX ADMIN — ASPIRIN 81 MG: 81 TABLET, COATED ORAL at 08:36

## 2021-01-01 RX ADMIN — DEXTROSE AND SODIUM CHLORIDE 125 ML/HR: 5; 450 INJECTION, SOLUTION INTRAVENOUS at 08:29

## 2021-01-01 RX ADMIN — FINASTERIDE 5 MG: 5 TABLET, FILM COATED ORAL at 09:15

## 2021-01-01 RX ADMIN — LEVETIRACETAM 2000 MG: 500 TABLET, FILM COATED, EXTENDED RELEASE ORAL at 08:26

## 2021-01-01 RX ADMIN — PANTOPRAZOLE SODIUM 40 MG: 40 TABLET, DELAYED RELEASE ORAL at 05:39

## 2021-01-01 RX ADMIN — INSULIN LISPRO 3 UNITS: 100 INJECTION, SOLUTION INTRAVENOUS; SUBCUTANEOUS at 08:44

## 2021-01-01 RX ADMIN — FINASTERIDE 5 MG: 5 TABLET, FILM COATED ORAL at 08:52

## 2021-01-01 RX ADMIN — DEXTROSE MONOHYDRATE 25 G: 500 INJECTION PARENTERAL at 01:29

## 2021-01-01 RX ADMIN — Medication 3 ML: at 20:25

## 2021-01-01 RX ADMIN — SULFUR HEXAFLUORIDE 2 ML: KIT at 11:45

## 2021-01-01 RX ADMIN — LEVETIRACETAM 2000 MG: 500 TABLET, FILM COATED, EXTENDED RELEASE ORAL at 15:04

## 2021-01-01 RX ADMIN — ASPIRIN 81 MG: 81 TABLET, COATED ORAL at 09:14

## 2021-01-01 RX ADMIN — LEVETIRACETAM 2000 MG: 500 TABLET, FILM COATED, EXTENDED RELEASE ORAL at 09:53

## 2021-01-01 RX ADMIN — Medication 3 ML: at 09:07

## 2021-01-01 RX ADMIN — LEVETIRACETAM 2000 MG: 500 TABLET, FILM COATED, EXTENDED RELEASE ORAL at 09:32

## 2021-01-01 RX ADMIN — POTASSIUM CHLORIDE 40 MEQ: 1500 TABLET, EXTENDED RELEASE ORAL at 08:35

## 2021-01-01 RX ADMIN — ASPIRIN 81 MG: 81 TABLET, COATED ORAL at 09:00

## 2021-01-01 RX ADMIN — PROPOFOL 20 MG: 10 INJECTION, EMULSION INTRAVENOUS at 16:35

## 2021-01-01 RX ADMIN — Medication 100 MCG: at 16:35

## 2021-01-01 RX ADMIN — FINASTERIDE 5 MG: 5 TABLET, FILM COATED ORAL at 09:33

## 2021-01-01 RX ADMIN — ENOXAPARIN SODIUM 90 MG: 100 INJECTION, SOLUTION INTRAVENOUS; SUBCUTANEOUS at 13:16

## 2021-01-01 RX ADMIN — DEXTROSE AND SODIUM CHLORIDE 125 ML/HR: 5; 450 INJECTION, SOLUTION INTRAVENOUS at 16:26

## 2021-01-01 RX ADMIN — DEXTROSE MONOHYDRATE 50 ML: 500 INJECTION PARENTERAL at 23:52

## 2021-01-01 RX ADMIN — PIPERACILLIN AND TAZOBACTAM 3.38 G: 3; .375 INJECTION, POWDER, LYOPHILIZED, FOR SOLUTION INTRAVENOUS at 21:27

## 2021-01-01 RX ADMIN — Medication 10 ML: at 09:00

## 2021-01-01 RX ADMIN — FINASTERIDE 5 MG: 5 TABLET, FILM COATED ORAL at 08:26

## 2021-01-01 RX ADMIN — FINASTERIDE 5 MG: 5 TABLET, FILM COATED ORAL at 08:11

## 2021-01-01 RX ADMIN — PANTOPRAZOLE SODIUM 40 MG: 40 TABLET, DELAYED RELEASE ORAL at 06:30

## 2021-01-01 RX ADMIN — Medication 3 ML: at 09:54

## 2021-01-01 RX ADMIN — Medication 3 ML: at 09:00

## 2021-01-01 RX ADMIN — LEVETIRACETAM 2000 MG: 500 TABLET, FILM COATED, EXTENDED RELEASE ORAL at 08:59

## 2021-01-01 RX ADMIN — FAMOTIDINE 40 MG: 20 TABLET, FILM COATED ORAL at 08:44

## 2021-01-01 RX ADMIN — PROPOFOL 20 MG: 10 INJECTION, EMULSION INTRAVENOUS at 16:46

## 2021-01-01 RX ADMIN — POLYETHYLENE GLYCOL 3350, SODIUM SULFATE, SODIUM CHLORIDE, POTASSIUM CHLORIDE, ASCORBIC ACID, SODIUM ASCORBATE 1000 ML: KIT at 09:01

## 2021-01-01 RX ADMIN — ESCITALOPRAM OXALATE 10 MG: 10 TABLET ORAL at 09:54

## 2021-01-01 RX ADMIN — IRON SUCROSE 200 MG: 20 INJECTION, SOLUTION INTRAVENOUS at 12:41

## 2021-01-01 RX ADMIN — BISOPROLOL FUMARATE 5 MG: 5 TABLET, FILM COATED ORAL at 09:04

## 2021-01-01 RX ADMIN — ACETAMINOPHEN 650 MG: 325 TABLET, FILM COATED ORAL at 19:05

## 2021-01-01 RX ADMIN — SODIUM CHLORIDE: 9 INJECTION, SOLUTION INTRAVENOUS at 16:30

## 2021-01-01 RX ADMIN — SODIUM CHLORIDE 100 ML/HR: 9 INJECTION, SOLUTION INTRAVENOUS at 23:44

## 2021-01-01 RX ADMIN — ESCITALOPRAM OXALATE 10 MG: 10 TABLET ORAL at 08:36

## 2021-01-01 RX ADMIN — PANTOPRAZOLE SODIUM 40 MG: 40 TABLET, DELAYED RELEASE ORAL at 06:18

## 2021-01-01 RX ADMIN — ATORVASTATIN CALCIUM 10 MG: 10 TABLET, FILM COATED ORAL at 20:56

## 2021-01-01 RX ADMIN — ASPIRIN 81 MG: 81 TABLET, COATED ORAL at 09:15

## 2021-01-01 RX ADMIN — Medication 100 MCG: at 16:38

## 2021-01-01 RX ADMIN — ENOXAPARIN SODIUM 90 MG: 100 INJECTION, SOLUTION INTRAVENOUS; SUBCUTANEOUS at 15:44

## 2021-01-01 RX ADMIN — Medication 3 ML: at 20:26

## 2021-01-01 RX ADMIN — INSULIN LISPRO 2 UNITS: 100 INJECTION, SOLUTION INTRAVENOUS; SUBCUTANEOUS at 17:43

## 2021-01-01 RX ADMIN — Medication 3 ML: at 08:35

## 2021-01-01 RX ADMIN — DEXTROSE AND SODIUM CHLORIDE 75 ML/HR: 5; 450 INJECTION, SOLUTION INTRAVENOUS at 21:27

## 2021-01-01 RX ADMIN — DEXTROSE AND SODIUM CHLORIDE 125 ML/HR: 5; 450 INJECTION, SOLUTION INTRAVENOUS at 23:30

## 2021-01-01 RX ADMIN — Medication 3 ML: at 20:55

## 2021-01-01 RX ADMIN — FUROSEMIDE 40 MG: 40 TABLET ORAL at 09:54

## 2021-01-01 RX ADMIN — BISOPROLOL FUMARATE 5 MG: 5 TABLET, FILM COATED ORAL at 08:36

## 2021-01-01 RX ADMIN — FINASTERIDE 5 MG: 5 TABLET, FILM COATED ORAL at 08:45

## 2021-01-01 RX ADMIN — FUROSEMIDE 40 MG: 40 TABLET ORAL at 09:14

## 2021-01-01 RX ADMIN — Medication 3 ML: at 22:00

## 2021-01-01 RX ADMIN — FINASTERIDE 5 MG: 5 TABLET, FILM COATED ORAL at 09:00

## 2021-01-01 RX ADMIN — INSULIN LISPRO 4 UNITS: 100 INJECTION, SOLUTION INTRAVENOUS; SUBCUTANEOUS at 17:28

## 2021-01-01 RX ADMIN — SODIUM CHLORIDE 100 ML/HR: 9 INJECTION, SOLUTION INTRAVENOUS at 23:25

## 2021-01-01 RX ADMIN — ATORVASTATIN CALCIUM 10 MG: 10 TABLET, FILM COATED ORAL at 20:33

## 2021-01-01 RX ADMIN — PIPERACILLIN AND TAZOBACTAM 3.38 G: 3; .375 INJECTION, POWDER, LYOPHILIZED, FOR SOLUTION INTRAVENOUS at 16:23

## 2021-01-01 RX ADMIN — ESCITALOPRAM OXALATE 10 MG: 10 TABLET ORAL at 08:11

## 2021-01-01 RX ADMIN — LEVETIRACETAM 2000 MG: 500 TABLET, FILM COATED, EXTENDED RELEASE ORAL at 09:23

## 2021-01-01 RX ADMIN — LEVETIRACETAM 2000 MG: 500 TABLET, FILM COATED, EXTENDED RELEASE ORAL at 08:35

## 2021-01-01 RX ADMIN — SODIUM CHLORIDE 50 ML/HR: 900 INJECTION INTRAVENOUS at 05:38

## 2021-01-01 RX ADMIN — ESCITALOPRAM OXALATE 10 MG: 10 TABLET ORAL at 08:52

## 2021-01-01 RX ADMIN — FUROSEMIDE 40 MG: 40 TABLET ORAL at 08:36

## 2021-01-01 RX ADMIN — ENOXAPARIN SODIUM 90 MG: 100 INJECTION, SOLUTION INTRAVENOUS; SUBCUTANEOUS at 13:08

## 2021-01-01 RX ADMIN — Medication 3 ML: at 09:16

## 2021-01-01 RX ADMIN — Medication 3 ML: at 08:11

## 2021-01-01 RX ADMIN — FINASTERIDE 5 MG: 5 TABLET, FILM COATED ORAL at 15:04

## 2021-01-01 RX ADMIN — Medication 3 ML: at 09:04

## 2021-01-01 RX ADMIN — IOPAMIDOL 100 ML: 755 INJECTION, SOLUTION INTRAVENOUS at 21:07

## 2021-01-01 RX ADMIN — LEVETIRACETAM 2000 MG: 500 TABLET, FILM COATED, EXTENDED RELEASE ORAL at 09:00

## 2021-01-01 RX ADMIN — LEVETIRACETAM 2000 MG: 500 TABLET, FILM COATED, EXTENDED RELEASE ORAL at 08:11

## 2021-01-01 RX ADMIN — FINASTERIDE 5 MG: 5 TABLET, FILM COATED ORAL at 09:54

## 2021-01-01 RX ADMIN — PANTOPRAZOLE SODIUM 40 MG: 40 TABLET, DELAYED RELEASE ORAL at 05:48

## 2021-01-01 RX ADMIN — FUROSEMIDE 40 MG: 40 TABLET ORAL at 09:15

## 2021-01-01 RX ADMIN — ASPIRIN 81 MG: 81 TABLET, COATED ORAL at 09:07

## 2021-01-01 RX ADMIN — ENOXAPARIN SODIUM 90 MG: 100 INJECTION, SOLUTION INTRAVENOUS; SUBCUTANEOUS at 13:10

## 2021-01-01 RX ADMIN — PANTOPRAZOLE SODIUM 40 MG: 40 TABLET, DELAYED RELEASE ORAL at 05:20

## 2021-01-01 RX ADMIN — Medication 100 MCG: at 16:40

## 2021-01-01 RX ADMIN — IRON SUCROSE 100 MG: 20 INJECTION, SOLUTION INTRAVENOUS at 18:06

## 2021-01-01 RX ADMIN — Medication 3 ML: at 21:28

## 2021-01-01 RX ADMIN — POTASSIUM CHLORIDE 40 MEQ: 1500 TABLET, EXTENDED RELEASE ORAL at 09:00

## 2021-01-01 RX ADMIN — POTASSIUM CHLORIDE 40 MEQ: 1500 TABLET, EXTENDED RELEASE ORAL at 09:24

## 2021-01-01 RX ADMIN — ESCITALOPRAM OXALATE 10 MG: 10 TABLET ORAL at 09:16

## 2021-01-01 RX ADMIN — Medication 100 MCG: at 16:51

## 2021-01-01 RX ADMIN — Medication 3 ML: at 20:34

## 2021-01-01 RX ADMIN — Medication 3 ML: at 08:56

## 2021-01-01 RX ADMIN — BISOPROLOL FUMARATE 5 MG: 5 TABLET, FILM COATED ORAL at 09:14

## 2021-01-01 RX ADMIN — PIPERACILLIN AND TAZOBACTAM 3.38 G: 3; .375 INJECTION, POWDER, LYOPHILIZED, FOR SOLUTION INTRAVENOUS at 21:15

## 2021-01-01 RX ADMIN — BISOPROLOL FUMARATE 5 MG: 5 TABLET, FILM COATED ORAL at 08:45

## 2021-01-01 RX ADMIN — LEVETIRACETAM 2000 MG: 500 TABLET, FILM COATED, EXTENDED RELEASE ORAL at 09:14

## 2021-01-01 RX ADMIN — ESCITALOPRAM OXALATE 10 MG: 10 TABLET ORAL at 08:26

## 2021-01-01 RX ADMIN — FINASTERIDE 5 MG: 5 TABLET, FILM COATED ORAL at 09:04

## 2021-01-01 RX ADMIN — FINASTERIDE 5 MG: 5 TABLET, FILM COATED ORAL at 08:36

## 2021-01-01 RX ADMIN — PIPERACILLIN AND TAZOBACTAM 3.38 G: 3; .375 INJECTION, POWDER, LYOPHILIZED, FOR SOLUTION INTRAVENOUS at 13:47

## 2021-01-01 RX ADMIN — PIPERACILLIN AND TAZOBACTAM 3.38 G: 3; .375 INJECTION, POWDER, LYOPHILIZED, FOR SOLUTION INTRAVENOUS at 05:38

## 2021-01-01 RX ADMIN — IRON SUCROSE 100 MG: 20 INJECTION, SOLUTION INTRAVENOUS at 20:45

## 2021-01-01 RX ADMIN — PIPERACILLIN AND TAZOBACTAM 3.38 G: 3; .375 INJECTION, POWDER, LYOPHILIZED, FOR SOLUTION INTRAVENOUS at 21:30

## 2021-01-01 RX ADMIN — BISOPROLOL FUMARATE 5 MG: 5 TABLET, FILM COATED ORAL at 09:07

## 2021-01-01 RX ADMIN — BISOPROLOL FUMARATE 5 MG: 5 TABLET, FILM COATED ORAL at 09:54

## 2021-01-01 RX ADMIN — LIDOCAINE HYDROCHLORIDE 40 MG: 10 INJECTION, SOLUTION EPIDURAL; INFILTRATION; INTRACAUDAL; PERINEURAL at 16:32

## 2021-01-01 RX ADMIN — LEVETIRACETAM 2000 MG: 500 TABLET, FILM COATED, EXTENDED RELEASE ORAL at 09:06

## 2021-01-01 RX ADMIN — LEVETIRACETAM 2000 MG: 500 TABLET, FILM COATED, EXTENDED RELEASE ORAL at 08:45

## 2021-01-01 RX ADMIN — Medication 150 MCG: at 16:55

## 2021-01-01 RX ADMIN — Medication 3 ML: at 09:13

## 2021-01-01 RX ADMIN — PROPOFOL 70 MG: 10 INJECTION, EMULSION INTRAVENOUS at 16:32

## 2021-01-01 RX ADMIN — FUROSEMIDE 40 MG: 40 TABLET ORAL at 09:00

## 2021-01-01 RX ADMIN — ASPIRIN 81 MG: 81 TABLET, COATED ORAL at 09:54

## 2021-01-01 RX ADMIN — ESCITALOPRAM OXALATE 10 MG: 10 TABLET ORAL at 15:04

## 2021-01-01 RX ADMIN — PROPOFOL 20 MG: 10 INJECTION, EMULSION INTRAVENOUS at 16:37

## 2021-01-01 RX ADMIN — PANTOPRAZOLE SODIUM 40 MG: 40 TABLET, DELAYED RELEASE ORAL at 09:14

## 2021-01-01 RX ADMIN — PANTOPRAZOLE SODIUM 40 MG: 40 TABLET, DELAYED RELEASE ORAL at 05:44

## 2021-01-01 RX ADMIN — ESCITALOPRAM OXALATE 10 MG: 10 TABLET ORAL at 09:04

## 2021-01-01 RX ADMIN — ESCITALOPRAM OXALATE 10 MG: 10 TABLET ORAL at 08:45

## 2021-01-01 RX ADMIN — PIPERACILLIN AND TAZOBACTAM 3.38 G: 3; .375 INJECTION, POWDER, LYOPHILIZED, FOR SOLUTION INTRAVENOUS at 13:42

## 2021-01-01 RX ADMIN — PROPOFOL 20 MG: 10 INJECTION, EMULSION INTRAVENOUS at 16:41

## 2021-01-01 RX ADMIN — Medication 100 MCG: at 16:59

## 2021-01-01 RX ADMIN — FAMOTIDINE 40 MG: 20 TABLET, FILM COATED ORAL at 09:00

## 2021-01-01 RX ADMIN — FUROSEMIDE 20 MG: 10 INJECTION, SOLUTION INTRAMUSCULAR; INTRAVENOUS at 11:53

## 2021-01-01 RX ADMIN — PANTOPRAZOLE SODIUM 40 MG: 40 TABLET, DELAYED RELEASE ORAL at 06:36

## 2021-03-13 PROBLEM — K92.2 GASTROINTESTINAL HEMORRHAGE: Status: ACTIVE | Noted: 2021-01-01

## 2021-03-14 NOTE — PLAN OF CARE
Goal Outcome Evaluation:  Plan of Care Reviewed With: patient  Progress: no change  Outcome Summary: Pt is a new admit from ER for rectal bleeding. Pt currently having bright red stools. Will continue to monitor.

## 2021-03-14 NOTE — OP NOTE
COLONOSCOPY Procedure Report    Patient Name:  Jim Montaño  YOB: 1926    Date of Surgery:  3/14/2021     Pre-Op Diagnosis:  Painless hematochezia       Post-Op Diagnosis Codes:  Diverticulosis and postsurgical changes      Procedure/CPT® Codes:      Procedure(s):  COLONOSCOPY with endoscopic sclerotherapy and endoscopic clipping    Staff:  Surgeon(s):  Goldy Healy MD         Anesthesia: Monitored Anesthesia Care    Implants:    Implant Name Type Inv. Item Serial No.  Lot No. LRB No. Used Action   DEV CLIP HEMO DURACLIP REPOSTNG COLONOSCOPE 16MM 235CM - [id number] Implant DEV CLIP HEMO DURACLIP REPOSTNG COLONOSCOPE 16MM 235CM  [employer] [id number] N/A 1 Implanted       Specimen:        See below    No blood loss    Complications:  None    Description of Procedure:  Informed consent was obtained for the procedure, including sedation.  Risks of perforation, hemorrhage, adverse drug reaction and aspiration were discussed.  The patient was brought into the endoscopy suite. Continuous cardiopulmonary monitoring was performed.  The patient was placed in the left lateral decubitus position. After adequate sedation was attained, the digital rectal exam was performed which was normal.  Subsequently, the pediatric Olympus colonoscope was inserted into the patient's rectum and advanced to the level of the ileocolonic anastomosis and neoterminal ileum without difficulty.  The bowel prep was excellent.  Circumferential examination of the patient's colon was performed on scope withdrawal.  A retroflex exam was performed in the rectum which showed normal mucosa.  The bowel was decompressed, the scope was withdrawn from the patient, and the patient tolerated the procedure well. There were no immediate post-operative complications.     Findings:    Neoterminal ileum with clear yellow fluid and normal mucosa  Ileocolonic anastomosis side to side  Normal mucosa and what remains of the distal  ascending and proximal transverse colon with yellow fluid  Sigmoid descending and proximal to mid transverse colon with severe diverticulosis and serosanguineous fluid with occasional blood clots.  Careful circumferential evaluation was performed with the use of the gator  for lavage.  The diverticula were carefully inspected.  There was one diverticulum in the proximal sigmoid colon that had stigmata of recent bleeding.  Epinephrine was injected x1 cc followed by placement of a single Hemoclip.    Impression:  Lower gastrointestinal hemorrhage secondary to diverticular bleed status post endoscopic hemostasis with epinephrine and placement of a single Hemoclip.      I treated the only diverticulum that had stigmata of recent bleeding.  There is a possibility that he could have bled from a different diverticulum.    Recommendations:  Monitor overnight  Clear liquid diet and if no rebleeding okay to advance diet tomorrow and discharge home.  Monitor H&H and transfuse blood if hemoglobin drops below 7      Goldy Healy MD     Date: 3/14/2021  Time: 17:04 EDT

## 2021-03-14 NOTE — ANESTHESIA PREPROCEDURE EVALUATION
Anesthesia Evaluation     Patient summary reviewed   NPO Solid Status: > 8 hours  NPO Liquid Status: > 8 hours           Airway   Mallampati: II  TM distance: >3 FB  Neck ROM: full  No difficulty expected  Dental - normal exam     Pulmonary - normal exam   (+) recent URI,   Cardiovascular - normal exam  Exercise tolerance: poor (<4 METS)    (+) hypertension, dysrhythmias Atrial Fib, CHF , hyperlipidemia,       Neuro/Psych  (+) seizures, CVA,     GI/Hepatic/Renal/Endo    (+)  GERD, GI bleeding , renal disease, diabetes mellitus,     Musculoskeletal     Abdominal  - normal exam    Bowel sounds: normal.   Substance History      OB/GYN          Other                        Anesthesia Plan    ASA 3     MAC     intravenous induction     Anesthetic plan, all risks, benefits, and alternatives have been provided, discussed and informed consent has been obtained with: patient.

## 2021-03-14 NOTE — H&P
Patient Care Team:  John Burleson MD as PCP - General  John Burleson MD as PCP - Family Medicine    Chief complaint bright red blood per rectum    Subjective     Patient is a 89 y.o. male with history of rectal bleeding presents with sudden onset of painless rectal bleeding yesterday that was persistent throughout the day.  He has a remote history of right-sided colon cancer for which she is status post right hemicolectomy.  He was hospitalized for bright red blood per rectum approximately 5 months ago.  That episode resolved with a bowel purge and no intervention was required.  He is chronically on aspirin.    Review of Systems   Constitutional: Positive for fatigue. Negative for activity change, appetite change, diaphoresis and fever.   HENT: Negative for facial swelling.    Eyes: Negative for visual disturbance.   Respiratory: Negative for cough, shortness of breath and stridor.    Gastrointestinal: Positive for blood in stool. Negative for abdominal pain, constipation, diarrhea, nausea, rectal pain and vomiting.   Genitourinary: Negative for dysuria.   Musculoskeletal: Negative for back pain.   Skin: Negative for rash.   Neurological: Negative for seizures.   Psychiatric/Behavioral: Negative for confusion.          History  Past Medical History:   Diagnosis Date   • A-fib (CMS/HCC)    • Cancer (CMS/HCC)     colon   • Hypertension    • Type 2 diabetes mellitus (CMS/HCC)      Past Surgical History:   Procedure Laterality Date   • PROSTATE SURGERY       Family History   Problem Relation Age of Onset   • Cancer Father         multipule mylomia      Social History     Tobacco Use   • Smoking status: Never Smoker   • Smokeless tobacco: Never Used   Substance Use Topics   • Alcohol use: Not Currently   • Drug use: Never     Medications Prior to Admission   Medication Sig Dispense Refill Last Dose   • aspirin (Aspir-Low) 81 MG EC tablet Take 81 mg by mouth Daily.      • bisoprolol (ZEBeta) 5 MG tablet Take 5  mg by mouth Daily.      • Cholecalciferol (vitamin D3) 125 MCG (5000 UT) capsule capsule Take 5,000 Units by mouth Daily.      • escitalopram (LEXAPRO) 10 MG tablet Take 10 mg by mouth Daily.      • finasteride (PROSCAR) 5 MG tablet Take 5 mg by mouth Daily.      • furosemide (LASIX) 20 MG tablet Take 40 mg by mouth Daily.      • insulin aspart (novoLOG) 100 UNIT/ML injection Inject 15 Units under the skin into the appropriate area as directed 3 (Three) Times a Day Before Meals.      • Insulin Glargine (BASAGLAR KWIKPEN SC) Inject 45 Units under the skin into the appropriate area as directed Every Night.      • levETIRAcetam XR (KEPPRA XR) 500 MG 24 hr tablet Take 2,000 mg by mouth Daily.      • potassium chloride (K-DUR,KLOR-CON) 20 MEQ CR tablet Take 40 mEq by mouth Daily.      • pravastatin (PRAVACHOL) 40 MG tablet Take 40 mg by mouth Daily.        Allergies:  Patient has no known allergies.    Objective     Vital Signs  Temp:  [97.3 °F (36.3 °C)-97.7 °F (36.5 °C)] 97.6 °F (36.4 °C)  Heart Rate:  [] 72  Resp:  [16-20] 18  BP: (118-150)/(63-78) 132/73     Physical Exam:      General Appearance:    Alert, cooperative, in no acute distress   Head:    Normocephalic, without obvious abnormality, atraumatic   Eyes:            Lids and lashes normal, conjunctivae and sclerae normal, no   icterus, no pallor, corneas clear, PERRLA   Ears:    Ears appear intact with no abnormalities noted   Throat:   No oral lesions, no thrush, oral mucosa moist   Neck:   No adenopathy, supple, trachea midline, no thyromegaly, no   carotid bruit, no JVD   Lungs:     Clear to auscultation,respirations regular, even and                  unlabored    Heart:    Regular rhythm and normal rate, normal S1 and S2, no            murmur, no gallop, no rub, no click   Chest Wall:    No abnormalities observed   Abdomen:     Normal bowel sounds, no masses, no organomegaly, soft        non-tender, non-distended, no guarding, no rebound                 tenderness   Extremities:   Moves all extremities well, no edema, no cyanosis, no             redness   Pulses:   Pulses palpable and equal bilaterally   Skin:   No bleeding, bruising or rash   Lymph nodes:   No palpable adenopathy   Neurologic:   Cranial nerves 2 - 12 grossly intact, sensation intact, DTR       present and equal bilaterally       Results Review:     Imaging Results (Last 24 Hours)     Procedure Component Value Units Date/Time    CT Abdomen Pelvis With Contrast [[id number]] Collected: 03/13/21 2120     Updated: 03/13/21 2130    Narrative:      EXAM: CT SCAN OF THE ABDOMEN AND PELVIS WITH INTRAVENOUS CONTRAST  DATE: 3/13/2021 8:57 PM    HISTORY: Rectal bleeding, history colon cancer  TECHNIQUE:  CT examination of the abdomen and pelvis was performed following the intravenous administration of 100 mL Isovue-370 . CT dose lowering techniques were used, to include: automated exposure control, adjustment for patient size, and/or use of   iterative reconstruction.  COMPARISON:  1/24/2020.    FINDINGS:  ABDOMEN/PELVIS:  Lower Chest: Multilevel degenerative disc  Liver: Liver cyst..    Gallbladder/Biliary: Normal gallbladder  Pancreas: Coarse calcifications along the atrophic pancreas.  Spleen: Normal.    Adrenal Glands: Normal.   Kidneys/Ureters/Bladder: Renal cysts. Normal bladder  GI Tract: Moderate hiatus hernia. Diverticulosis without diverticulitis. Suture near the cecum suggest appendectomy.  Mesentery/Peritoneum: Normal mesentery  Reproductive: Normal pelvis.  Vasculature: Normal.     Lymph Nodes: Stable lymph node or varix just to the right of the right common iliac artery (image 90).  Abdominal Wall: Normal.   Musculoskeletal: T12 hemangioma.      Impression:        1.  No acute findings.    2.  Evidence of chronic pancreatitis.    Electronically signed by:  Audrey Song    3/13/2021 7:29 PM           Lab Results (last 24 hours)     Procedure Component Value Units Date/Time    POC Glucose Once  [[id number]]  (Abnormal) Collected: 03/14/21 1202    Specimen: Blood Updated: 03/14/21 1203     Glucose 124 mg/dL      Comment: Serial Number: [id number]:  [id number]       COVID PRE-OP / PRE-PROCEDURE SCREENING ORDER (NO ISOLATION) - Swab, Nasopharynx [[id number]] Collected: 03/14/21 0945    Specimen: Swab from Nasopharynx Updated: 03/14/21 1049    Narrative:      The following orders were created for panel order COVID PRE-OP / PRE-PROCEDURE SCREENING ORDER (NO ISOLATION) - Swab, Nasopharynx.  Procedure                               Abnormality         Status                     ---------                               -----------         ------                     COVID-19,APTIMA PANTHER,...[[id number]]                      In process                   Please view results for these tests on the individual orders.    COVID-19,APTIMA PANTHER,[facility], NP/OP SWAB IN UTM/VTM/[facility] TRANSPORT MEDIA,24 HR TAT - Swab, Nasopharynx [[id number]] Collected: 03/14/21 0945    Specimen: Swab from Nasopharynx Updated: 03/14/21 1049    Iron Profile [[id number]]  (Abnormal) Collected: 03/14/21 0332    Specimen: Blood Updated: 03/14/21 0857     Iron 26 mcg/dL      Iron Saturation 9 %      Transferrin 185 mg/dL      TIBC 276 mcg/dL     POC Glucose Once [[id number]]  (Abnormal) Collected: 03/14/21 0732    Specimen: Blood Updated: 03/14/21 0733     Glucose 110 mg/dL      Comment: Serial Number: [id number]:  [id number]       Basic Metabolic Panel [[id number]]  (Abnormal) Collected: 03/14/21 0332    Specimen: Blood Updated: 03/14/21 0455     Glucose 156 mg/dL      BUN 26 mg/dL      Creatinine 1.46 mg/dL      Sodium 136 mmol/L      Potassium 4.1 mmol/L      Chloride 106 mmol/L      CO2 19.0 mmol/L      Calcium 7.8 mg/dL      eGFR Non African Amer 45 mL/min/1.73      BUN/Creatinine Ratio 17.8     Anion Gap 11.0 mmol/L     Narrative:      GFR Normal >60  Chronic Kidney Disease <60  Kidney Failure <15      CBC Auto Differential  [[id number]]  (Abnormal) Collected: 03/14/21 0332    Specimen: Blood Updated: 03/14/21 0435     WBC 8.40 10*3/mm3      RBC 3.94 10*6/mm3      Hemoglobin 10.2 g/dL      Hematocrit 32.1 %      MCV 81.5 fL      MCH 26.0 pg      MCHC 31.9 g/dL      RDW 17.8 %      RDW-SD 51.2 fl      MPV 8.8 fL      Platelets 240 10*3/mm3      Neutrophil % 69.5 %      Lymphocyte % 17.1 %      Monocyte % 7.6 %      Eosinophil % 5.1 %      Basophil % 0.7 %      Neutrophils, Absolute 5.80 10*3/mm3      Lymphocytes, Absolute 1.40 10*3/mm3      Monocytes, Absolute 0.60 10*3/mm3      Eosinophils, Absolute 0.40 10*3/mm3      Basophils, Absolute 0.10 10*3/mm3      nRBC 0.0 /100 WBC     Hemoglobin A1c [[id number]] Collected: 03/13/21 1956    Specimen: Blood Updated: 03/13/21 2311    Extra Tubes [[id number]] Collected: 03/13/21 1956    Specimen: Blood, Venous Line Updated: 03/13/21 2100    Narrative:      The following orders were created for panel order Extra Tubes.  Procedure                               Abnormality         Status                     ---------                               -----------         ------                     Light Blue Top[[id number]]                                   Final result               Gold Top - SST[[id number]]                                   Final result                 Please view results for these tests on the individual orders.    Light Blue Top [[id number]] Collected: 03/13/21 1956    Specimen: Blood Updated: 03/13/21 2100     Extra Tube hold for add-on     Comment: Auto resulted       Gold Top - SST [[id number]] Collected: 03/13/21 1956    Specimen: Blood Updated: 03/13/21 2100     Extra Tube Hold for add-ons.     Comment: Auto resulted.       Comprehensive Metabolic Panel [[id number]]  (Abnormal) Collected: 03/13/21 1956    Specimen: Blood Updated: 03/13/21 2032     Glucose 190 mg/dL      BUN 22 mg/dL      Creatinine 1.57 mg/dL      Sodium 140 mmol/L      Potassium 4.5 mmol/L      Comment: Slight  hemolysis detected by analyzer. Results may be affected.        Chloride 106 mmol/L      CO2 22.0 mmol/L      Calcium 8.3 mg/dL      Total Protein 7.7 g/dL      Albumin 3.60 g/dL      ALT (SGPT) 6 U/L      AST (SGOT) 20 U/L      Comment: Slight hemolysis detected by analyzer. Results may be affected.        Alkaline Phosphatase 70 U/L      Total Bilirubin 0.7 mg/dL      eGFR Non African Amer 41 mL/min/1.73      Globulin 4.1 gm/dL      A/G Ratio 0.9 g/dL      BUN/Creatinine Ratio 14.0     Anion Gap 12.0 mmol/L     Narrative:      GFR Normal >60  Chronic Kidney Disease <60  Kidney Failure <15      Lipase [[id number]]  (Abnormal) Collected: 03/13/21 1956    Specimen: Blood Updated: 03/13/21 2028     Lipase 8 U/L     CBC & Differential [[id number]]  (Abnormal) Collected: 03/13/21 1956    Specimen: Blood Updated: 03/13/21 2011    Narrative:      The following orders were created for panel order CBC & Differential.  Procedure                               Abnormality         Status                     ---------                               -----------         ------                     CBC Auto Differential[[id number]]        Abnormal            Final result                 Please view results for these tests on the individual orders.    CBC Auto Differential [[id number]]  (Abnormal) Collected: 03/13/21 1956    Specimen: Blood Updated: 03/13/21 2011     WBC 8.40 10*3/mm3      RBC 4.58 10*6/mm3      Hemoglobin 11.8 g/dL      Hematocrit 37.6 %      MCV 82.2 fL      MCH 25.7 pg      MCHC 31.3 g/dL      RDW 18.2 %      RDW-SD 52.9 fl      MPV 8.6 fL      Platelets 280 10*3/mm3      Neutrophil % 71.0 %      Lymphocyte % 14.1 %      Monocyte % 7.3 %      Eosinophil % 6.3 %      Basophil % 1.3 %      Neutrophils, Absolute 6.00 10*3/mm3      Lymphocytes, Absolute 1.20 10*3/mm3      Monocytes, Absolute 0.60 10*3/mm3      Eosinophils, Absolute 0.50 10*3/mm3      Basophils, Absolute 0.10 10*3/mm3      nRBC 0.1 /100 WBC     POC Glucose  Once [[id number]]  (Abnormal) Collected: 03/13/21 2002    Specimen: Blood Updated: 03/13/21 2003     Glucose 146 mg/dL      Comment: Serial Number: [id number]:  [id number]              I reviewed the patient's new clinical results.    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     Elaine Tapia MD  03/14/21  12:15 EDT

## 2021-03-14 NOTE — CONSULTS
GI CONSULT  NOTE:    Referring Provider:    Dr Tapia     Chief complaint:   GI bleed     Subjective      History of present illness:   Patient is 89-year-old male with a history of colon cancer, atrial fib, hypertension and diabetes who presented to the emergency room 3/13/2021 with a complaint of rectal bleeding.  Patient's hemoglobin was 11.8 in the ER and noted to have dried blood on his buttock area.  CT of the abdomen and pelvis was done and was unremarkable.  Patient had an episode of rectal bleeding in November 2020 also and was seen by our service at that time.  Patient states he had been having some diarrhea which became bloody.  Patient states he has been seeing intermittent blood for the last several months.  Denies any constipation.  Denies any straining.  Denies any dizziness, chest pain, or shortness of breath.  Patient states his right hemicolectomy was in [place] a couple of years ago.  Denies any NSAIDs or thinners besides aspirin.  No weight loss.    Endo History:  2018 and 2011 colonoscopy in [place] reports as negative.    Past Medical History:  Past Medical History:   Diagnosis Date   • A-fib (CMS/HCC)    • Cancer (CMS/HCC)     colon   • Hypertension    • Type 2 diabetes mellitus (CMS/HCC)        Past Surgical History:  Past Surgical History:   Procedure Laterality Date   • PROSTATE SURGERY         Social History:  Social History     Tobacco Use   • Smoking status: Never Smoker   • Smokeless tobacco: Never Used   Substance Use Topics   • Alcohol use: Not Currently   • Drug use: Never       Family History:  Family History   Problem Relation Age of Onset   • Cancer Father         multipule mylomia        Medications:  Medications Prior to Admission   Medication Sig Dispense Refill Last Dose   • aspirin (Aspir-Low) 81 MG EC tablet Take 81 mg by mouth Daily.      • bisoprolol (ZEBeta) 5 MG tablet Take 5 mg by mouth Daily.      • Cholecalciferol (vitamin D3) 125 MCG (5000 UT) capsule capsule Take  5,000 Units by mouth Daily.      • escitalopram (LEXAPRO) 10 MG tablet Take 10 mg by mouth Daily.      • finasteride (PROSCAR) 5 MG tablet Take 5 mg by mouth Daily.      • furosemide (LASIX) 20 MG tablet Take 40 mg by mouth Daily.      • insulin aspart (novoLOG) 100 UNIT/ML injection Inject 15 Units under the skin into the appropriate area as directed 3 (Three) Times a Day Before Meals.      • Insulin Glargine (BASAGLAR KWIKPEN SC) Inject 45 Units under the skin into the appropriate area as directed Every Night.      • levETIRAcetam XR (KEPPRA XR) 500 MG 24 hr tablet Take 2,000 mg by mouth Daily.      • potassium chloride (K-DUR,KLOR-CON) 20 MEQ CR tablet Take 40 mEq by mouth Daily.      • pravastatin (PRAVACHOL) 40 MG tablet Take 40 mg by mouth Daily.          Scheduled Meds:bisoprolol, 5 mg, Oral, Daily  escitalopram, 10 mg, Oral, Daily  famotidine, 40 mg, Oral, Daily  finasteride, 5 mg, Oral, Daily  insulin lispro, 0-7 Units, Subcutaneous, Q6H  levETIRAcetam XR, 2,000 mg, Oral, Daily  PEG-KCl-NaCl-NaSulf-Na Asc-C, 1,000 mL, Oral, Q12H  sodium chloride, 3 mL, Intravenous, Q12H      Continuous Infusions:sodium chloride, 100 mL/hr, Last Rate: 100 mL/hr (03/14/21 0401)      PRN Meds:.•  acetaminophen  •  dextrose  •  dextrose  •  glucagon (human recombinant)  •  insulin lispro **AND** insulin lispro  •  [COMPLETED] Insert peripheral IV **AND** sodium chloride  •  sodium chloride    ALLERGIES:  Patient has no known allergies.    ROS:  Review of Systems   Respiratory: Negative for shortness of breath and stridor.    Cardiovascular: Negative for chest pain.   Gastrointestinal: Positive for anal bleeding, blood in stool and diarrhea. Negative for abdominal pain, constipation, nausea, rectal pain and vomiting.   Neurological: Negative for dizziness.       The following systems were reviewed and negative;   Constitution:  No fevers, chills, no unintentional weight loss  Skin: no rash, no jaundice  Eyes:  No blurry vision,  "no eye pain  HENT:  No change in hearing or smell  Resp:  No dyspnea or cough  CV:  No chest pain or palpitations  :  No dysuria, hematuria  Musculoskeletal:  No leg cramps or arthralgias  Neuro:  No tremor, no numbness  Psych:  No depression or confsuion    Objective  Resting in bed    Vital Signs:   Vitals:    03/13/21 2158 03/13/21 2159 03/13/21 2251 03/14/21 0327   BP:   150/69 134/76   BP Location:   Left arm Left arm   Patient Position:   Lying Lying   Pulse: 89 91 87 79   Resp:   16 16   Temp:   97.7 °F (36.5 °C) 97.3 °F (36.3 °C)   TempSrc:   Oral Oral   SpO2: 98% 94% 94% 95%   Weight:   82.9 kg (182 lb 11.2 oz)    Height:   177.8 cm (70\")        Physical Exam:   General Appearance:    Awake and alert, in no acute distress   Head:    Normocephalic, without obvious abnormality, atraumatic   Eyes:            Conjunctivae normal, anicteric sclera, pupils equal   Ears:    Ears appear intact with no abnormalities noted   Throat:   No oral lesions, no thrush, oral mucosa moist   Neck:   Supple, no JVD   Lungs:     Clear to auscultation bilaterally, respirations regular, even and unlabored    Heart:    Regular rhythm and normal rate, normal S1 and S2, no            Murmur appreciated   Chest Wall:    No abnormalities observed   Abdomen:     Normal bowel sounds, soft, non-tender, no rebound or guarding, non-distended, no hepatosplenomegaly   Rectal:     Deferred   Extremities:   Moves all extremities, no edema, no cyanosis   Pulses:   Pulses palpable and equal bilaterally   Skin:   No rash, no jaundice, normal palpaion   Lymph nodes:   No cervical, supraclavicular or submandibular palpable adenopathy   Neurologic:   Cranial nerves 2 - 12 grossly intact, no asterixis       Results Review:   I reviewed the patient's labs and imaging.  Lab Results (last 24 hours)     Procedure Component Value Units Date/Time    POC Glucose Once [[id number]]  (Abnormal) Collected: 03/14/21 0732    Specimen: Blood Updated: 03/14/21 0733 "     Glucose 110 mg/dL      Comment: Serial Number: [id number]:  [id number]       Basic Metabolic Panel [[id number]]  (Abnormal) Collected: 03/14/21 0332    Specimen: Blood Updated: 03/14/21 0455     Glucose 156 mg/dL      BUN 26 mg/dL      Creatinine 1.46 mg/dL      Sodium 136 mmol/L      Potassium 4.1 mmol/L      Chloride 106 mmol/L      CO2 19.0 mmol/L      Calcium 7.8 mg/dL      eGFR Non African Amer 45 mL/min/1.73      BUN/Creatinine Ratio 17.8     Anion Gap 11.0 mmol/L     Narrative:      GFR Normal >60  Chronic Kidney Disease <60  Kidney Failure <15      CBC Auto Differential [[id number]]  (Abnormal) Collected: 03/14/21 0332    Specimen: Blood Updated: 03/14/21 0435     WBC 8.40 10*3/mm3      RBC 3.94 10*6/mm3      Hemoglobin 10.2 g/dL      Hematocrit 32.1 %      MCV 81.5 fL      MCH 26.0 pg      MCHC 31.9 g/dL      RDW 17.8 %      RDW-SD 51.2 fl      MPV 8.8 fL      Platelets 240 10*3/mm3      Neutrophil % 69.5 %      Lymphocyte % 17.1 %      Monocyte % 7.6 %      Eosinophil % 5.1 %      Basophil % 0.7 %      Neutrophils, Absolute 5.80 10*3/mm3      Lymphocytes, Absolute 1.40 10*3/mm3      Monocytes, Absolute 0.60 10*3/mm3      Eosinophils, Absolute 0.40 10*3/mm3      Basophils, Absolute 0.10 10*3/mm3      nRBC 0.0 /100 WBC     Hemoglobin A1c [[id number]] Collected: 03/13/21 1956    Specimen: Blood Updated: 03/13/21 2311    Extra Tubes [[id number]] Collected: 03/13/21 1956    Specimen: Blood, Venous Line Updated: 03/13/21 2100    Narrative:      The following orders were created for panel order Extra Tubes.  Procedure                               Abnormality         Status                     ---------                               -----------         ------                     Light Blue Top[[id number]]                                   Final result               Gold Top - SST[[id number]]                                   Final result                 Please view results for these tests on the individual  orders.    Light Blue Top [[id number]] Collected: 03/13/21 1956    Specimen: Blood Updated: 03/13/21 2100     Extra Tube hold for add-on     Comment: Auto resulted       Gold Top - SST [[id number]] Collected: 03/13/21 1956    Specimen: Blood Updated: 03/13/21 2100     Extra Tube Hold for add-ons.     Comment: Auto resulted.       Comprehensive Metabolic Panel [[id number]]  (Abnormal) Collected: 03/13/21 1956    Specimen: Blood Updated: 03/13/21 2032     Glucose 190 mg/dL      BUN 22 mg/dL      Creatinine 1.57 mg/dL      Sodium 140 mmol/L      Potassium 4.5 mmol/L      Comment: Slight hemolysis detected by analyzer. Results may be affected.        Chloride 106 mmol/L      CO2 22.0 mmol/L      Calcium 8.3 mg/dL      Total Protein 7.7 g/dL      Albumin 3.60 g/dL      ALT (SGPT) 6 U/L      AST (SGOT) 20 U/L      Comment: Slight hemolysis detected by analyzer. Results may be affected.        Alkaline Phosphatase 70 U/L      Total Bilirubin 0.7 mg/dL      eGFR Non African Amer 41 mL/min/1.73      Globulin 4.1 gm/dL      A/G Ratio 0.9 g/dL      BUN/Creatinine Ratio 14.0     Anion Gap 12.0 mmol/L     Narrative:      GFR Normal >60  Chronic Kidney Disease <60  Kidney Failure <15      Lipase [[id number]]  (Abnormal) Collected: 03/13/21 1956    Specimen: Blood Updated: 03/13/21 2028     Lipase 8 U/L     CBC & Differential [[id number]]  (Abnormal) Collected: 03/13/21 1956    Specimen: Blood Updated: 03/13/21 2011    Narrative:      The following orders were created for panel order CBC & Differential.  Procedure                               Abnormality         Status                     ---------                               -----------         ------                     CBC Auto Differential[[id number]]        Abnormal            Final result                 Please view results for these tests on the individual orders.    CBC Auto Differential [[id number]]  (Abnormal) Collected: 03/13/21 1956    Specimen: Blood Updated: 03/13/21  2011     WBC 8.40 10*3/mm3      RBC 4.58 10*6/mm3      Hemoglobin 11.8 g/dL      Hematocrit 37.6 %      MCV 82.2 fL      MCH 25.7 pg      MCHC 31.3 g/dL      RDW 18.2 %      RDW-SD 52.9 fl      MPV 8.6 fL      Platelets 280 10*3/mm3      Neutrophil % 71.0 %      Lymphocyte % 14.1 %      Monocyte % 7.3 %      Eosinophil % 6.3 %      Basophil % 1.3 %      Neutrophils, Absolute 6.00 10*3/mm3      Lymphocytes, Absolute 1.20 10*3/mm3      Monocytes, Absolute 0.60 10*3/mm3      Eosinophils, Absolute 0.50 10*3/mm3      Basophils, Absolute 0.10 10*3/mm3      nRBC 0.1 /100 WBC     POC Glucose Once [[id number]]  (Abnormal) Collected: 03/13/21 2002    Specimen: Blood Updated: 03/13/21 2003     Glucose 146 mg/dL      Comment: Serial Number: [id number]:  [id number]             Imaging Results (Last 24 Hours)     Procedure Component Value Units Date/Time    CT Abdomen Pelvis With Contrast [[id number]] Collected: 03/13/21 2120     Updated: 03/13/21 2130    Narrative:      EXAM: CT SCAN OF THE ABDOMEN AND PELVIS WITH INTRAVENOUS CONTRAST  DATE: 3/13/2021 8:57 PM    HISTORY: Rectal bleeding, history colon cancer  TECHNIQUE:  CT examination of the abdomen and pelvis was performed following the intravenous administration of 100 mL Isovue-370 . CT dose lowering techniques were used, to include: automated exposure control, adjustment for patient size, and/or use of   iterative reconstruction.  COMPARISON:  1/24/2020.    FINDINGS:  ABDOMEN/PELVIS:  Lower Chest: Multilevel degenerative disc  Liver: Liver cyst..    Gallbladder/Biliary: Normal gallbladder  Pancreas: Coarse calcifications along the atrophic pancreas.  Spleen: Normal.    Adrenal Glands: Normal.   Kidneys/Ureters/Bladder: Renal cysts. Normal bladder  GI Tract: Moderate hiatus hernia. Diverticulosis without diverticulitis. Suture near the cecum suggest appendectomy.  Mesentery/Peritoneum: Normal mesentery  Reproductive: Normal pelvis.  Vasculature: Normal.     Lymph Nodes:  Stable lymph node or varix just to the right of the right common iliac artery (image 90).  Abdominal Wall: Normal.   Musculoskeletal: T12 hemangioma.      Impression:        1.  No acute findings.    2.  Evidence of chronic pancreatitis.    Electronically signed by:  Audrey Song    3/13/2021 7:29 PM             ASSESSMENT AND PLAN:  Rectal bleeding-consider hemorrhoidal, anastomotic ulcer, colitis, carcinoma  Normocytic anemia  Colon cancer with a history of right hemicolectomy  Acute on chronic kidney disease  Atrial fibrillation on 81 mg aspirin  Seizures  CVA  Diabetes  Hypertension    PLAN:  Monitor H&H and transfuse as needed  Check iron studies and give IV iron if needed.  Bowel purge & possible colonoscopy -he has not received first dose yet so I doubt but will be today.  We will order Covid test in preparation  Supportive care in the interim.      I discussed the patients findings and my recommendations with the patient.  Aura Swan, MAREN  03/14/21  07:38 EDT    Time:

## 2021-03-14 NOTE — ED NOTES
Pt reports bright red rectal bleeding since this morning. Reports hx of colon resection previously.      Elysia Gould, RN  03/13/21 1947

## 2021-03-14 NOTE — ED PROVIDER NOTES
Subjective   Patient is a 89-year-old male presents emergency department with a complaint of rectal bleeding onset today.  He reports that he has had bright red blood in his stool since this morning, and is progressively darkening throughout the day.  He denies any abdominal pain.  He is not had any diarrhea.  No fever or chills.  No vomiting.  No urinary symptoms.          Review of Systems   Constitutional: Negative for chills and fever.   Respiratory: Negative for chest tightness and shortness of breath.    Cardiovascular: Negative for chest pain.   Gastrointestinal: Positive for blood in stool. Negative for abdominal pain, diarrhea, nausea, rectal pain and vomiting.   Genitourinary: Negative for dysuria.   Musculoskeletal: Negative for back pain.   Skin: Negative for color change and wound.   Neurological: Negative for dizziness, syncope, weakness and light-headedness.       Past Medical History:   Diagnosis Date   • A-fib (CMS/HCC)    • Cancer (CMS/HCC)     colon   • Hypertension    • Type 2 diabetes mellitus (CMS/HCC)        No Known Allergies    Past Surgical History:   Procedure Laterality Date   • PROSTATE SURGERY         Family History   Problem Relation Age of Onset   • Cancer Father         multipule mylomia        Social History     Socioeconomic History   • Marital status:      Spouse name: Not on file   • Number of children: Not on file   • Years of education: Not on file   • Highest education level: Not on file   Tobacco Use   • Smoking status: Never Smoker   • Smokeless tobacco: Never Used   Substance and Sexual Activity   • Alcohol use: Not Currently   • Drug use: Never           Objective   Physical Exam  Vitals and nursing note reviewed.   Constitutional:       General: He is not in acute distress.     Appearance: Normal appearance. He is not ill-appearing, toxic-appearing or diaphoretic.   HENT:      Head: Normocephalic and atraumatic.      Nose: Nose normal.      Mouth/Throat:       "Mouth: Mucous membranes are moist.      Pharynx: Oropharynx is clear.   Eyes:      Extraocular Movements: Extraocular movements intact.      Conjunctiva/sclera: Conjunctivae normal.      Pupils: Pupils are equal, round, and reactive to light.   Cardiovascular:      Rate and Rhythm: Normal rate and regular rhythm.      Heart sounds: Normal heart sounds. No murmur. No friction rub. No gallop.    Pulmonary:      Effort: Pulmonary effort is normal.      Breath sounds: Normal breath sounds.   Abdominal:      General: Bowel sounds are normal.      Palpations: Abdomen is soft.      Tenderness: There is no abdominal tenderness. There is no guarding or rebound.   Genitourinary:     Comments: Chaperone with JAYLYN Naidu  Dried blood noted to buttock and bubba rectal area. No severe bleeding or hemorrhage noted at this time.   Musculoskeletal:         General: Normal range of motion.      Cervical back: Normal range of motion and neck supple.      Right lower leg: No edema.      Left lower leg: No edema.   Skin:     General: Skin is warm and dry.      Capillary Refill: Capillary refill takes less than 2 seconds.   Neurological:      General: No focal deficit present.      Mental Status: He is alert and oriented to person, place, and time.   Psychiatric:         Mood and Affect: Mood normal.         Behavior: Behavior normal.         Procedures           ED Course  /65   Pulse 87   Temp 97.4 °F (36.3 °C) (Oral)   Resp 20   Ht 177.8 cm (70\")   Wt 84 kg (185 lb 3 oz)   SpO2 96%   BMI 26.57 kg/m²   Labs Reviewed   COMPREHENSIVE METABOLIC PANEL - Abnormal; Notable for the following components:       Result Value    Glucose 190 (*)     Creatinine 1.57 (*)     eGFR Non  Amer 41 (*)     All other components within normal limits    Narrative:     GFR Normal >60  Chronic Kidney Disease <60  Kidney Failure <15     LIPASE - Abnormal; Notable for the following components:    Lipase 8 (*)     All other components within " normal limits   CBC WITH AUTO DIFFERENTIAL - Abnormal; Notable for the following components:    Hemoglobin 11.8 (*)     MCH 25.7 (*)     MCHC 31.3 (*)     RDW 18.2 (*)     Lymphocyte % 14.1 (*)     Eosinophil % 6.3 (*)     Eosinophils, Absolute 0.50 (*)     All other components within normal limits   POCT GLUCOSE FINGERSTICK - Abnormal; Notable for the following components:    Glucose 146 (*)     All other components within normal limits   TYPE AND SCREEN   BB ARMBAND CHECK   CBC AND DIFFERENTIAL    Narrative:     The following orders were created for panel order CBC & Differential.  Procedure                               Abnormality         Status                     ---------                               -----------         ------                     CBC Auto Differential[[id number]]        Abnormal            Final result                 Please view results for these tests on the individual orders.   EXTRA TUBES    Narrative:     The following orders were created for panel order Extra Tubes.  Procedure                               Abnormality         Status                     ---------                               -----------         ------                     Light Blue Top[[id number]]                                   Final result               Gold Top - SST[[id number]]                                   Final result                 Please view results for these tests on the individual orders.   LIGHT BLUE TOP   GOLD TOP - SST     Medications   sodium chloride 0.9 % flush 10 mL (has no administration in time range)   pantoprazole (PROTONIX) injection 80 mg (80 mg Intravenous Given 3/13/21 2108)   iopamidol (ISOVUE-370) 76 % injection 100 mL (100 mL Intravenous Given 3/13/21 2107)     No radiology results for the last day    ED Course as of Mar 13 2206   Sat Mar 13, 2021   2143 Spoke to Dr. Tapia    [LB]      ED Course User Index  [LB] Latricia Pierce, MAREN      Appropriate PPE was worn during the duration  of the care for this patient while in the emergency department per [facility] guidelines                                     MDM  Number of Diagnoses or Management Options  Gastrointestinal hemorrhage, unspecified gastrointestinal hemorrhage type  Diagnosis management comments: Differentials: GI bleeding, enteritis, hemorrhoid  This list is not all inclusive and does not constitute the entireity of considered causes.     Labs reviewed by me and significant for the following: Abnormal Labs Reviewed  COMPREHENSIVE METABOLIC PANEL - Abnormal; Notable for the following components:     Glucose                       190 (*)                Creatinine                    1.57 (*)               eGFR Non  Amer         41 (*)              All other components within normal limits         Narrative: GFR Normal >60                  Chronic Kidney Disease <60                  Kidney Failure <15                    LIPASE - Abnormal; Notable for the following components:     Lipase                        8 (*)               All other components within normal limits  CBC WITH AUTO DIFFERENTIAL - Abnormal; Notable for the following components:     Hemoglobin                    11.8 (*)               MCH                           25.7 (*)               MCHC                          31.3 (*)               RDW                           18.2 (*)               Lymphocyte %                  14.1 (*)               Eosinophil %                  6.3 (*)                Eosinophils, Absolute         0.50 (*)            All other components within normal limits  POCT GLUCOSE FINGERSTICK - Abnormal; Notable for the following components:     Glucose                       146 (*)             All other components within normal limits      Imaging, Interpreted per radiologist, independently viewed by myself: No radiology results for the last day-CT results as above      Patient was brought back to the emergency department room for  evaluation and  placed on appropriate monitoring.   IV was established, blood work obtained.  Vital signs have  been reviewed. Patient is afebrile.  He underwent the above exam and work-up for blood in his stool.  Is benign nontender abdominal exam.  No severe bleeding on exam.  Hemoglobin currently stable.  Family medicine was consulted, patient will be admitted.  He was given Protonix here in the ED.    Hemodynamically stable, without acute distress.    Plan and Disposition: I discussed with the patient their test results, work-up here in the emergency department, and need for admission and further evaluation.  Patient is agreeable to the plan of care.  Opportunity was provided for questions at the bedside, all questions and concerns were addressed.           Amount and/or Complexity of Data Reviewed  Clinical lab tests: reviewed  Tests in the radiology section of CPT®: reviewed  Decide to obtain previous medical records or to obtain history from someone other than the patient: yes    Patient Progress  Patient progress: stable      Final diagnoses:   Gastrointestinal hemorrhage, unspecified gastrointestinal hemorrhage type            Latricia Pierce, APRN  03/13/21 4240

## 2021-03-15 NOTE — PROGRESS NOTES
LOS: 0 days   Patient Care Team:  John Burleson MD as PCP - General  John Burleson MD as PCP - Family Medicine      Subjective     Interval History:     Subjective: Patient feeling well today.  He is tolerating diet.  No nausea, vomiting, or abdominal pain.  Reports scant amount of bright red blood per rectum overnight.  Hemoglobin did drop from 10.2 to 8.6 but has remained stable throughout the day today. Most recent hemoglobin 8.4 at 12:21 PM.    ROS:   No chest pain, shortness of breath, or cough.  No fever or chills.       Medication Review:     Current Facility-Administered Medications:   •  acetaminophen (TYLENOL) tablet 650 mg, 650 mg, Oral, Q4H PRN, Goldy Healy MD  •  bisoprolol (ZEBeta) tablet 5 mg, 5 mg, Oral, Daily, Goldy Healy MD, 5 mg at 03/15/21 0904  •  dextrose (D50W) 25 g/ 50mL Intravenous Solution 25 g, 25 g, Intravenous, Q15 Min PRN, Goldy Healy MD  •  dextrose (GLUTOSE) oral gel 15 g, 15 g, Oral, Q15 Min PRN, Goldy Healy MD  •  escitalopram (LEXAPRO) tablet 10 mg, 10 mg, Oral, Daily, Goldy Healy MD, 10 mg at 03/15/21 0904  •  famotidine (PEPCID) tablet 40 mg, 40 mg, Oral, Daily, Goldy Healy MD, 40 mg at 03/15/21 0904  •  finasteride (PROSCAR) tablet 5 mg, 5 mg, Oral, Daily, Goldy Healy MD, 5 mg at 03/15/21 0904  •  glucagon (human recombinant) (GLUCAGEN DIAGNOSTIC) injection 1 mg, 1 mg, Subcutaneous, PRN, Goldy Healy MD  •  insulin lispro (ADMELOG) injection 0-7 Units, 0-7 Units, Subcutaneous, TID With Meals, 2 Units at 03/15/21 1218 **AND** insulin lispro (ADMELOG) injection 0-7 Units, 0-7 Units, Subcutaneous, PRN, Goldy Healy MD  •  levETIRAcetam XR (KEPPRA XR) 24 hr tablet 2,000 mg, 2,000 mg, Oral, Daily, Goldy Healy MD, 2,000 mg at 03/15/21 0904  •  ondansetron (ZOFRAN) tablet 4 mg, 4 mg, Oral, Q6H PRN **OR** ondansetron (ZOFRAN) injection 4 mg, 4 mg, Intravenous, Q6H PRN, Goldy Healy  MD Maya  •  [COMPLETED] Insert peripheral IV, , , Once **AND** sodium chloride 0.9 % flush 10 mL, 10 mL, Intravenous, PRN, Goldy Healy MD  •  sodium chloride 0.9 % flush 3 mL, 3 mL, Intravenous, Q12H, Goldy Healy MD, 3 mL at 03/15/21 0904  •  sodium chloride 0.9 % flush 3-10 mL, 3-10 mL, Intravenous, PRN, Goldy Healy MD      Objective  Resting in bed, NAD, no family present    Vital Signs  Vitals:    03/14/21 1740 03/14/21 1953 03/15/21 0416 03/15/21 0800   BP: 120/88 124/78 113/69 110/74   BP Location:  Right arm Right arm    Patient Position:  Lying Lying    Pulse: 81 86 89 104   Resp: 20 18 20 19   Temp:  97.4 °F (36.3 °C) 97.9 °F (36.6 °C) 97.8 °F (36.6 °C)   TempSrc:  Oral Oral Oral   SpO2: 95% 91% 93% 94%   Weight:       Height:           Physical Exam:     General Appearance:    Awake and alert, in no acute distress   Head:    Normocephalic, without obvious abnormality   Eyes:          Conjunctivae normal, anicteric sclera   Throat:   No oral lesions, no thrush, oral mucosa moist   Neck:   No adenopathy, supple, no JVD   Lungs:     respirations regular, even and unlabored   Abdomen:     Soft, non-tender, no rebound or guarding, non-distended, no hepatosplenomegaly   Rectal:     Deferred   Extremities:   No edema, no cyanosis   Skin:   No bruising or rash, no jaundice        Results Review:    CBC    Results from last 7 days   Lab Units 03/15/21  1221 03/15/21  0558 03/14/21  2356 03/14/21  1913 03/14/21 0332 03/13/21 1956   WBC 10*3/mm3  --   --   --   --  8.40 8.40   HEMOGLOBIN g/dL 8.4* 8.2* 8.6* 10.1* 10.2* 11.8*   PLATELETS 10*3/mm3  --   --   --   --  240 280     CMP   Results from last 7 days   Lab Units 03/14/21 0332 03/13/21  1956   SODIUM mmol/L 136 140   POTASSIUM mmol/L 4.1 4.5   CHLORIDE mmol/L 106 106   CO2 mmol/L 19.0* 22.0   BUN mg/dL 26* 22   CREATININE mg/dL 1.46* 1.57*   GLUCOSE mg/dL 156* 190*   ALBUMIN g/dL  --  3.60   BILIRUBIN mg/dL  --  0.7   ALK PHOS  U/L  --  70   AST (SGOT) U/L  --  20   ALT (SGPT) U/L  --  6   LIPASE U/L  --  8*     Cr Clearance Estimated Creatinine Clearance: 36.3 mL/min (A) (by C-G formula based on SCr of 1.46 mg/dL (H)).  Coag     HbA1C   Lab Results   Component Value Date    HGBA1C 11.9 (H) 08/25/2020     Blood Glucose   Glucose   Date/Time Value Ref Range Status   03/15/2021 1125 181 (H) 70 - 105 mg/dL Final     Comment:     Serial Number: [id number]:  [id number]   03/15/2021 0723 106 (H) 70 - 105 mg/dL Final     Comment:     Serial Number: [id number]:  [id number]   03/14/2021 1948 160 (H) 70 - 105 mg/dL Final     Comment:     Serial Number: [id number]:  [id number]   03/14/2021 1605 151 (H) 70 - 105 mg/dL Final     Comment:     Serial Number: [id number]:  [id number]   03/14/2021 1202 124 (H) 70 - 105 mg/dL Final     Comment:     Serial Number: [id number]:  [id number]   03/14/2021 0732 110 (H) 70 - 105 mg/dL Final     Comment:     Serial Number: [id number]:  [id number]   03/13/2021 2002 146 (H) 70 - 105 mg/dL Final     Comment:     Serial Number: [id number]:  [id number]     Infection     UA      Radiology(recent) CT Abdomen Pelvis With Contrast    Result Date: 3/13/2021  1.  No acute findings. 2.  Evidence of chronic pancreatitis. Electronically signed by:  Audrey Song  3/13/2021 7:29 PM         Assessment/Plan     ASSESSMENT   89-year-old male with personal history of colon cancer s/p right hemicolectomy, history of GI bleed 11/2020 which resolved with conservative management who presents with acute onset of painless rectal bleeding which began 3/13.  Colonoscopy on 3/14 showed lower GI hemorrhage secondary to diverticular bleed s/p endoscopic hemostasis with epi and placement of a single Hemoclip.     -Rectal bleeding -s/p colonoscopy showing diverticular bleed  -Normocytic anemia  -Colon cancer with a history of right hemicolectomy  -Acute on chronic kidney disease  -Atrial fibrillation on 81  mg aspirin  -Seizures  -CVA  -Diabetes  -Hypertension     PLAN  Patient feeling well today.  He is tolerating diet.  No nausea, vomiting, or abdominal pain.  Reports scant amount of bright red blood per rectum overnight.  Hemoglobin did drop from 10.2 to 8.6 but has remained stable throughout the day today. Most recent hemoglobin 8.4 at 12:21 PM.  BUN 26, creat 1.46, iron saturation 9, serum iron 26, TIBC 276, WBC 8.4, platelets 240. Add IV iron.    Continue to monitor H/H and transfuse as needed to keep hemoglobin > 7  Continue diet as tolerated  Okay for d/c home from GI standpoint.  Supportive care     MAREN Rogers  03/15/21  13:46 EDT

## 2021-03-15 NOTE — PLAN OF CARE
Goal Outcome Evaluation:        Outcome Summary: Pt alert and oriented able to make needs known. Denies pain at this time, pt diet upgraded and pt tolerated breakfast well. VS stable at this time.

## 2021-03-15 NOTE — PROGRESS NOTES
Continued Stay Note  NELLI Villagran     Patient Name: Jim Montaño  MRN: [id number]  Today's Date: 3/15/2021    Admit Date: 3/13/2021    Discharge Plan     Row Name 03/15/21 1508       Plan    Plan  Anticipate return to [facility], per pt preference.    Plan Comments  SW met with pt at the bedside to complete cancer distress screening. Pt reports no emotional or financial distress. Pt is optimistic regarding possibly returning home today.        Met with patient in room wearing PPE: mask, face shield/goggles.      Maintained distance greater than six feet and spent less than 15 minutes in the room.      Reyna Shi [facility], LSW    Office: [phone]  Cell: [phone]  Fax: [phone]  E-mail: [email or web address]

## 2021-03-15 NOTE — PLAN OF CARE
Problem: Adult Inpatient Plan of Care  Goal: Plan of Care Review  Recent Flowsheet Documentation  Taken 3/15/2021 1020 by Yakelin Bar PT  Progress: improving  Plan of Care Reviewed With: patient   Goal Outcome Evaluation:  Plan of Care Reviewed With: patient  Progress: improving  Pt is an alert and oriented  89 yo male ADM due to GI hemorrhage. Pt reports he lives at [facility] and has help come in for housekeeping and meals 6 days a week. Prior level of function: Pt was conditional independent walking community distances with FWW. Pt was independent dressing bathing, independent with bed mobility and transfers. Today pt was alert and oriented x 4. Pt had no c/o pain. Pt ambulated 300 feet conditional independent quality. Pt was conditional independent with bed mobility and transfers. Pt appears at baseline. No PT is indicated at this time. Recommendation is D/C home with prior level of assist established. PPE: Mask, eyeshield, gloves.

## 2021-03-15 NOTE — PROGRESS NOTES
LOS: 0 days   Patient Care Team:  John Burleson MD as PCP - General  John Burleson MD as PCP - Family Medicine    Subjective:  Nearing baseline.  Hemoglobin however continues to trend downwards    Objective:   Afebrile.  No new complaints      Review of Systems:   Review of Systems   Constitutional: Positive for activity change and appetite change.   Respiratory: Negative.    Cardiovascular: Negative.    Gastrointestinal: Positive for anal bleeding and blood in stool. Negative for abdominal distention, abdominal pain, constipation, diarrhea, nausea, rectal pain and vomiting.   Musculoskeletal: Positive for arthralgias.   Neurological: Positive for weakness.   Psychiatric/Behavioral: Negative.            Vital Signs  Temp:  [97.4 °F (36.3 °C)-97.9 °F (36.6 °C)] 97.9 °F (36.6 °C)  Heart Rate:  [71-89] 89  Resp:  [18-22] 20  BP: ()/(44-88) 113/69    Physical Exam:  Physical Exam  Vitals and nursing note reviewed.   Constitutional:       Appearance: Normal appearance.   HENT:      Head: Normocephalic and atraumatic.   Cardiovascular:      Rate and Rhythm: Normal rate. Rhythm irregular.      Pulses: Normal pulses.   Pulmonary:      Breath sounds: Normal breath sounds.   Skin:     General: Skin is warm.   Neurological:      General: No focal deficit present.      Mental Status: He is alert and oriented to person, place, and time.          Radiology:  CT Abdomen Pelvis With Contrast    Result Date: 3/13/2021  1.  No acute findings. 2.  Evidence of chronic pancreatitis. Electronically signed by:  Audrey Song  3/13/2021 7:29 PM         Results Review:     I reviewed the patient's new clinical results.  I reviewed the patient's new imaging results and agree with the interpretation.    Medication Review:   Scheduled Meds:bisoprolol, 5 mg, Oral, Daily  escitalopram, 10 mg, Oral, Daily  famotidine, 40 mg, Oral, Daily  finasteride, 5 mg, Oral, Daily  insulin lispro, 0-7 Units, Subcutaneous, TID With  Meals  levETIRAcetam XR, 2,000 mg, Oral, Daily  PEG-KCl-NaCl-NaSulf-Na Asc-C, 1,000 mL, Oral, Q12H  sodium chloride, 3 mL, Intravenous, Q12H      Continuous Infusions:sodium chloride, 100 mL/hr, Last Rate: 100 mL/hr (03/14/21 2344)      PRN Meds:.•  acetaminophen  •  dextrose  •  dextrose  •  glucagon (human recombinant)  •  insulin lispro **AND** insulin lispro  •  ondansetron **OR** ondansetron  •  [COMPLETED] Insert peripheral IV **AND** sodium chloride  •  sodium chloride    Labs:    CBC    Results from last 7 days   Lab Units 03/15/21  0558 03/14/21  2356 03/14/21 1913 03/14/21 0332 03/13/21 1956   WBC 10*3/mm3  --   --   --  8.40 8.40   HEMOGLOBIN g/dL 8.2* 8.6* 10.1* 10.2* 11.8*   PLATELETS 10*3/mm3  --   --   --  240 280     BMP   Results from last 7 days   Lab Units 03/14/21 0332 03/13/21 1956   SODIUM mmol/L 136 140   POTASSIUM mmol/L 4.1 4.5   CHLORIDE mmol/L 106 106   CO2 mmol/L 19.0* 22.0   BUN mg/dL 26* 22   CREATININE mg/dL 1.46* 1.57*   GLUCOSE mg/dL 156* 190*     Cr Clearance Estimated Creatinine Clearance: 36.3 mL/min (A) (by C-G formula based on SCr of 1.46 mg/dL (H)).  Coag     HbA1C   Lab Results   Component Value Date    HGBA1C 11.9 (H) 08/25/2020     Blood Glucose   Glucose   Date/Time Value Ref Range Status   03/14/2021 1948 160 (H) 70 - 105 mg/dL Final     Comment:     Serial Number: [id number]:  [id number]   03/14/2021 1605 151 (H) 70 - 105 mg/dL Final     Comment:     Serial Number: [id number]:  [id number]   03/14/2021 1202 124 (H) 70 - 105 mg/dL Final     Comment:     Serial Number: [id number]:  [id number]   03/14/2021 0732 110 (H) 70 - 105 mg/dL Final     Comment:     Serial Number: [id number]:  [id number]   03/13/2021 2002 146 (H) 70 - 105 mg/dL Final     Comment:     Serial Number: [id number]:  [id number]     Infection     CMP   Results from last 7 days   Lab Units 03/14/21  0332 03/13/21 1956   SODIUM mmol/L 136 140   POTASSIUM mmol/L 4.1 4.5    CHLORIDE mmol/L 106 106   CO2 mmol/L 19.0* 22.0   BUN mg/dL 26* 22   CREATININE mg/dL 1.46* 1.57*   GLUCOSE mg/dL 156* 190*   ALBUMIN g/dL  --  3.60   BILIRUBIN mg/dL  --  0.7   ALK PHOS U/L  --  70   AST (SGOT) U/L  --  20   ALT (SGPT) U/L  --  6   LIPASE U/L  --  8*     UA      Radiology(recent) CT Abdomen Pelvis With Contrast    Result Date: 3/13/2021  1.  No acute findings. 2.  Evidence of chronic pancreatitis. 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  He will receive an iron infusion and we will begin physical therapy.  We will anticipate discharge home if his hemoglobin remains stable overnight.  We will use this opportunity to provide additional diabetic teaching        John Burleson MD  03/15/21  07:04 EDT

## 2021-03-15 NOTE — THERAPY EVALUATION
Patient Name: Jim Montaño  : 1926    MRN: [id number]                              Today's Date: 3/15/2021       Admit Date: 3/13/2021    Visit Dx:     ICD-10-CM ICD-9-CM   1. Gastrointestinal hemorrhage, unspecified gastrointestinal hemorrhage type  K92.2 578.9     Patient Active Problem List   Diagnosis   • Type 2 diabetes mellitus with hyperglycemia, with long-term current use of insulin (CMS/HCC)   • Temporary cerebral vascular dysfunction   • Cerebrovascular accident (CVA) (CMS/HCC)   • Seizure disorder (CMS/HCC)   • Essential hypertension   • Mixed hyperlipidemia   • Gastroesophageal reflux disease   • Cough   • Colon cancer (CMS/HCC)   • Body mass index (BMI) of 29.0-29.9 in adult   • Atrial fibrillation (CMS/HCC)   • Adenocarcinoma of cecum (CMS/HCC)   • Hypokalemia   • Hemorrhoids   • Hemorrhage of rectum and anus   • Fatigue   • Upper respiratory infection   • Congestive heart failure (CMS/HCC)   • Chronic renal insufficiency, stage III (moderate) (CMS/HCC)   • Bradycardia   • Blood in urine   • Benign prostatic hyperplasia   • Hyperglycemia   • Stage 3 chronic kidney disease (CMS/HCC)   • Gastrointestinal hemorrhage     Past Medical History:   Diagnosis Date   • A-fib (CMS/HCC)    • Cancer (CMS/HCC)     colon   • Hypertension    • Type 2 diabetes mellitus (CMS/HCC)      Past Surgical History:   Procedure Laterality Date   • PROSTATE SURGERY       General Information     Row Name 03/15/21 1018          Physical Therapy Time and Intention    Document Type  evaluation  -     Mode of Treatment  physical therapy  -     Row Name 03/15/21 1018          General Information    Prior Level of Function  independent:;bed mobility;dressing;bathing;gait;transfer  -     Row Name 03/15/21 1018          Living Environment    Lives With  alone  -     Row Name 03/15/21 1018          Home Main Entrance    Number of Stairs, Main Entrance  none  -     Stair Railings, Main Entrance  none  -     Row Name  03/15/21 1018          Stairs Within Home, Primary    Number of Stairs, Within Home, Primary  none  -     Row Name 03/15/21 1018          Cognition    Orientation Status (Cognition)  oriented x 4  -       User Key  (r) = Recorded By, (t) = Taken By, (c) = Cosigned By    Initials Name Provider Type    Yakelin Mcdonnell PT Physical Therapist        Mobility     Row Name 03/15/21 1018          Bed Mobility    Bed Mobility  bed mobility (all) activities  -     All Activities, [place] (Bed Mobility)  modified independence  -     Row Name 03/15/21 1018          Sit-Stand Transfer    Sit-Stand [place] (Transfers)  modified independence  -     Assistive Device (Sit-Stand Transfers)  walker, front-wheeled  -       User Key  (r) = Recorded By, (t) = Taken By, (c) = Cosigned By    Initials Name Provider Type    Yakelin Mcdonnell PT Physical Therapist        Obj/Interventions     Row Name 03/15/21 1019          Range of Motion Comprehensive    General Range of Motion  bilateral lower extremity ROM WFL  -     Row Name 03/15/21 1019          Strength Comprehensive (MMT)    Comment, General Manual Muscle Testing (MMT) Assessment  4+/5 NICOLAS LE's  -     Row Name 03/15/21 1019          Balance    Balance Assessment  sitting static balance;sitting dynamic balance;standing static balance;standing dynamic balance  -     Static Sitting Balance  WFL  -     Dynamic Sitting Balance  WFL  -     Static Standing Balance  WFL  -     Dynamic Standing Balance  [facility]  -     Balance Interventions  sitting;standing;sit to stand;supported;static;dynamic;minimal challenge  -       User Key  (r) = Recorded By, (t) = Taken By, (c) = Cosigned By    Initials Name Provider Type    Yakelin Mcdonnell PT Physical Therapist        Goals/Plan    No documentation.       Clinical Impression     Row Name 03/15/21 1020          Pain    Additional Documentation  Pain Scale: FACES Pre/Post-Treatment (Group)  -     Row Name  03/15/21 1020          Pain Scale: FACES Pre/Post-Treatment    Pain: FACES Scale, Pretreatment  0-->no hurt  -     Posttreatment Pain Rating  0-->no hurt  -     Row Name 03/15/21 1020          Plan of Care Review    Plan of Care Reviewed With  patient  -     Progress  improving  -     Row Name 03/15/21 1020          Therapy Assessment/Plan (PT)    Criteria for Skilled Interventions Met (PT)  no problems identified which require skilled intervention  -     Row Name 03/15/21 1020          Vital Signs    Pre Patient Position  Supine  -     Intra Patient Position  Standing  -WC     Post Patient Position  Sitting  -[facility] Name 03/15/21 1020          Positioning and Restraints    Pre-Treatment Position  in bed  -     Post Treatment Position  chair  -       User Key  (r) = Recorded By, (t) = Taken By, (c) = Cosigned By    Initials Name Provider Type    Yakelin Mcdonnell PT Physical Therapist        Outcome Measures     Row Name 03/15/21 1021          How much help from another person do you currently need...    Turning from your back to your side while in flat bed without using bedrails?  4  -WC     Moving from lying on back to sitting on the side of a flat bed without bedrails?  4  -WC     Moving to and from a bed to a chair (including a wheelchair)?  4  -WC     Standing up from a chair using your arms (e.g., wheelchair, bedside chair)?  4  -WC     Climbing 3-5 steps with a railing?  4  -WC     To walk in hospital room?  4  -WC     AM-PAC 6 Clicks Score (PT)  24  -[facility] Name 03/15/21 1021          Functional Assessment    Outcome Measure Options  AM-PAC 6 Clicks Basic Mobility (PT)  -       User Key  (r) = Recorded By, (t) = Taken By, (c) = Cosigned By    Initials Name Provider Type    Yakelin Mcdonnell PT Physical Therapist        Physical Therapy Education                 Title: PT OT SLP Therapies (Done)     Topic: Physical Therapy (Done)     Point: Mobility training (Done)     Learning  Progress Summary           Patient Acceptance, E,TB, VU by  at 3/15/2021 1022                   Point: Home exercise program (Done)     Learning Progress Summary           Patient Acceptance, E,TB, VU by  at 3/15/2021 1022                   Point: Body mechanics (Done)     Learning Progress Summary           Patient Acceptance, E,TB, VU by  at 3/15/2021 1022                   Point: Precautions (Done)     Learning Progress Summary           Patient Acceptance, E,TB, VU by  at 3/15/2021 1022                               User Key     Initials Effective Dates Name Provider Type Discipline     01/07/20 -  Yakelin Bar PT Physical Therapist PT              PT Recommendation and Plan    Pt is an alert and oriented 89 yo male ADM due to GI hemorrhage. Pt reports he lives at [facility] and has help come in for housekeeping and meals 6 days a week. Prior level of function: Pt was conditional independent walking community distances with FWW. Pt was independent dressing bathing, independent with bed mobility and transfers. Today pt was alert and oriented x 4. Pt had no c/o pain. Pt ambulated 300 feet conditional independent quality. Pt was conditional independent with bed mobility and transfers. Pt appears at baseline. No PT is indicated at this time.     Recommendation is D/C home with prior level of assist established. PPE: Mask, eyeshield, gloves.     Plan of Care Reviewed With: patient  Progress: improving     Time Calculation:   PT Charges     Row Name 03/15/21 1027             Time Calculation    Start Time  0945  -      Stop Time  1015  -      Time Calculation (min)  30 min  -      PT Received On  03/15/21  -        User Key  (r) = Recorded By, (t) = Taken By, (c) = Cosigned By    Initials Name Provider Type     Yakelin Bar PT Physical Therapist        Therapy Charges for Today     Code Description Service Date Service Provider Modifiers Qty    09712334548 HC PT EVAL MOD COMPLEXITY 3 3/15/2021  Yakelin Bar, PT GP 1          PT G-Codes  Outcome Measure Options: AM-PAC 6 Clicks Basic Mobility (PT)  AM-PAC 6 Clicks Score (PT): 24    Yakelin Bar, PT  3/15/2021

## 2021-03-15 NOTE — PROGRESS NOTES
Discharge Planning Assessment  [facility]     Patient Name: Jim Montaño  MRN: [id number]  Today's Date: 3/15/2021    Admit Date: 3/13/2021    Discharge Needs Assessment     Row Name 03/15/21 1640       Living Environment    Lives With  alone    Unique Family Situation  Paid caregivers 6 days per wk, son, Frantz and his sister help.    Current Living Arrangements  home/apartment/condo    Primary Care Provided by  other (see comments)    Provides Primary Care For  no one, unable/limited ability to care for self    Family Caregiver if Needed  child(thaddeus), adult;other (see comments)    Family Caregiver Names  Frantz, adrianna has pvt caregiver.    Able to Return to Prior Arrangements  yes       Transition Planning    Patient/Family Anticipates Transition to  home with help/services    Patient/Family Anticipated Services at Transition  none    Transportation Anticipated  family or friend will provide       Discharge Needs Assessment    Readmission Within the Last 30 Days  no previous admission in last 30 days    Equipment Currently Used at Home  glucometer;walker, rolling    Concerns to be Addressed  denies needs/concerns at this time    Equipment Needed After Discharge  none    Discharge Coordination/Progress  DC Plan: Return home with pvt caregivers 6 days/wk. Son Frantz or sister for transportation.        Discharge Plan     Row Name 03/15/21 1644       Plan    Plan  DC Plan: Return home with pvt caregivers 6 days/wk. Raphael Landry or  for transportation.    Plan Comments  PT states pt near baseline.    Row Name 03/15/21 1508       Plan    Plan  Anticipate return to [facility], per pt preference.    Plan Comments  SW met with pt at the bedside to complete cancer distress screening. Pt reports no emotional or financial distress. Pt is optimistic regarding possibly returning home today.        Continued Care and Services - Admitted Since 3/13/2021    Coordination has not been started for this encounter.         Demographic  Summary     Row Name 03/15/21 1638       General Information    Admission Type  observation    Arrived From  emergency department    Required Notices Provided  Observation Status Notice    Referral Source  admission list    Reason for Consult  discharge planning    Preferred Language  English     Used During This Interaction  no    General Information Comments  Spoke to pt.        Functional Status     Row Name 03/15/21 1639       Functional Status    Usual Activity Tolerance  good    Current Activity Tolerance  good       Functional Status, IADL    Medications  assistive person    Meal Preparation  assistive person    Housekeeping  assistive person    Laundry  assistive person    Shopping  assistive person    IADL Comments  Pvt pay caregive 6 days per wk.       Mental Status    General Appearance WDL  WDL       Mental Status Summary    Recent Changes in Mental Status/Cognitive Functioning  no changes        Met with patient in room wearing PPE: mask, goggles.      Maintained distance greater than six feet and spent less than 15 minutes in the room.        Patient Forms     Row Name 03/15/21 1645       Patient Forms    Important Message from Medicare (IMM)  Delivered GIL 3/13/21 registration            Bonnie Reynolds RN

## 2021-03-15 NOTE — ANESTHESIA POSTPROCEDURE EVALUATION
Patient: Jim Montaño    Procedure Summary     Date: 03/14/21 Room / Location: [facility] ENDOSCOPY 1 / [facility] ENDOSCOPY    Anesthesia Start: 1626 Anesthesia Stop: 1704    Procedure: COLONOSCOPY with endoscopic sclerotherapy and endoscopic clipping (N/A ) Diagnosis:     Surgeons: Goldy Healy MD Provider: Stephen Winkler MD    Anesthesia Type: MAC ASA Status: 3          Anesthesia Type: MAC    Vitals  Vitals Value Taken Time   /88 03/14/21 1740   Temp     Pulse 81 03/14/21 1740   Resp 20 03/14/21 1740   SpO2 95 % 03/14/21 1740           Post Anesthesia Care and Evaluation    Patient location during evaluation: bedside  Patient participation: complete - patient participated  Level of consciousness: awake  Pain scale: See nurse's notes for pain score.  Pain management: adequate  Airway patency: patent  Anesthetic complications: No anesthetic complications  PONV Status: none  Cardiovascular status: acceptable  Respiratory status: acceptable  Hydration status: acceptable    Comments: Patient seen and examined postoperatively; vital signs stable; SpO2 greater than or equal to 90%; cardiopulmonary status stable; nausea/vomiting adequately controlled; pain adequately controlled; no apparent anesthesia complications; patient discharged from anesthesia care when discharge criteria were met

## 2021-03-16 NOTE — PROGRESS NOTES
LOS: 0 days   Patient Care Team:  John Burleson MD as PCP - General  John Burleson MD as PCP - Family Medicine    Subjective:  Large amount of bright red blood per rectum noted this morning.  He feels well overall without abdominal pain or cramping.    Objective:   Afebrile      Review of Systems:   Review of Systems   Constitutional: Negative.    Respiratory: Negative.    Cardiovascular: Negative.    Gastrointestinal: Positive for blood in stool.   Musculoskeletal: Positive for arthralgias.           Vital Signs  Temp:  [97.3 °F (36.3 °C)-98 °F (36.7 °C)] 98 °F (36.7 °C)  Heart Rate:  [] 88  Resp:  [18-20] 20  BP: (110-140)/(73-79) 131/78    Physical Exam:  Physical Exam  Constitutional:       Appearance: Normal appearance.   Cardiovascular:      Rate and Rhythm: Normal rate.   Pulmonary:      Breath sounds: Normal breath sounds.   Skin:     General: Skin is warm.   Neurological:      Mental Status: He is alert.          Radiology:  CT Abdomen Pelvis With Contrast    Result Date: 3/13/2021  1.  No acute findings. 2.  Evidence of chronic pancreatitis. Electronically signed by:  Audrey Song  3/13/2021 7:29 PM         Results Review:     I reviewed the patient's new clinical results.  I reviewed the patient's new imaging results and agree with the interpretation.    Medication Review:   Scheduled Meds:bisoprolol, 5 mg, Oral, Daily  escitalopram, 10 mg, Oral, Daily  famotidine, 40 mg, Oral, Daily  finasteride, 5 mg, Oral, Daily  insulin lispro, 0-7 Units, Subcutaneous, TID With Meals  iron sucrose, 100 mg, Intravenous, Q24H  levETIRAcetam XR, 2,000 mg, Oral, Daily  sodium chloride, 3 mL, Intravenous, Q12H      Continuous Infusions:   PRN Meds:.•  acetaminophen  •  dextrose  •  dextrose  •  glucagon (human recombinant)  •  insulin lispro **AND** insulin lispro  •  ondansetron **OR** ondansetron  •  [COMPLETED] Insert peripheral IV **AND** sodium chloride  •  sodium chloride    Labs:    CBC    Results  from last 7 days   Lab Units 03/16/21  0406 03/15/21  2351 03/15/21  1221 03/15/21  0558 03/14/21 2356 03/14/21 1913 03/14/21 0332 03/13/21 1956   WBC 10*3/mm3 6.10 7.00  --   --   --   --  8.40 8.40   HEMOGLOBIN g/dL 8.1* 8.2* 8.4* 8.2* 8.6* 10.1* 10.2* 11.8*   PLATELETS 10*3/mm3 188 218  --   --   --   --  240 280     BMP   Results from last 7 days   Lab Units 03/16/21  0406 03/15/21  2351 03/14/21  0332 03/13/21  1956   SODIUM mmol/L 141 139 136 140   POTASSIUM mmol/L 3.7 3.7 4.1 4.5   CHLORIDE mmol/L 111* 109* 106 106   CO2 mmol/L 19.0* 17.0* 19.0* 22.0   BUN mg/dL 16 17 26* 22   CREATININE mg/dL 1.48* 1.53* 1.46* 1.57*   GLUCOSE mg/dL 213* 216* 156* 190*     Cr Clearance Estimated Creatinine Clearance: 36.6 mL/min (A) (by C-G formula based on SCr of 1.48 mg/dL (H)).  Coag     HbA1C   Lab Results   Component Value Date    HGBA1C 11.9 (H) 08/25/2020     Blood Glucose   Glucose   Date/Time Value Ref Range Status   03/16/2021 0708 214 (H) 70 - 105 mg/dL Final     Comment:     Serial Number: [id number]:  [id number]   03/15/2021 2111 199 (H) 70 - 105 mg/dL Final     Comment:     Serial Number: [id number]:  [id number]   03/15/2021 1635 123 (H) 70 - 105 mg/dL Final     Comment:     Serial Number: [id number]:  [id number]   03/15/2021 1125 181 (H) 70 - 105 mg/dL Final     Comment:     Serial Number: [id number]:  [id number]   03/15/2021 0723 106 (H) 70 - 105 mg/dL Final     Comment:     Serial Number: [id number]:  [id number]   03/14/2021 1948 160 (H) 70 - 105 mg/dL Final     Comment:     Serial Number: [id number]:  [id number]   03/14/2021 1605 151 (H) 70 - 105 mg/dL Final     Comment:     Serial Number: [id number]:  [id number]   03/14/2021 1202 124 (H) 70 - 105 mg/dL Final     Comment:     Serial Number: [id number]:  [id number]     Infection     CMP   Results from last 7 days   Lab Units 03/16/21  0406 03/15/21  2351 03/14/21  0332 03/13/21 1956   SODIUM mmol/L 141 139  136 140   POTASSIUM mmol/L 3.7 3.7 4.1 4.5   CHLORIDE mmol/L 111* 109* 106 106   CO2 mmol/L 19.0* 17.0* 19.0* 22.0   BUN mg/dL 16 17 26* 22   CREATININE mg/dL 1.48* 1.53* 1.46* 1.57*   GLUCOSE mg/dL 213* 216* 156* 190*   ALBUMIN g/dL 3.00*  --   --  3.60   BILIRUBIN mg/dL 0.4  --   --  0.7   ALK PHOS U/L 56  --   --  70   AST (SGOT) U/L 13  --   --  20   ALT (SGPT) U/L 6  --   --  6   LIPASE U/L  --   --   --  8*     UA      Radiology(recent) No radiology results for the last day   Assessment:    Acute lower GI hemorrhage secondary to diverticular bleed status post acute endoscopic hemostasis with epinephrine and placement of a single Hemoclip 3/14/2021  Iron deficiency  Acute blood loss anemia superimposed upon ACD, B12 deficiency, and iron deficiency  Diabetes mellitus type 2 with neuropathic, renal and angiopathic manifestations  Chronic systolic congestive heart failure / CHFrEF 27%  Cerebrovascular disease status post CVA  Seizure disorder  Diabetic dyslipidemia  Chronic kidney disease stage IIIa secondary to hypertensive nephropathy and DGS  History of colon cancer  History of right hemicolectomy  Secondary thrombophilia  Atrial fibrillation, chronic  Gastroesophageal reflux disease without gastritis  Degenerative disc disease lumbosacral spine  Degenerative joint disease  Vitamin B12 deficiency  Chronic insulin use  BPH with LUTS  Chronic pancreatitis  Coronary artery calcifications  LVH  Pulmonary hypertension  Hypertension associated with chronic kidney disease stage IIIa  Atherosclerotic heart disease of native coronary arteries with angina pectoris    Plan:  Observe today given large amount of bright red blood per rectum this morning.  If his hemoglobin remains stable will anticipate discharge home later today.        John Burleson MD  03/16/21  07:27 EDT

## 2021-03-16 NOTE — NURSING NOTE
Call out to Dr. Burleson d/t patient having episode of Bloody stool, Hgb remains 8.1. awaiting call back.

## 2021-03-16 NOTE — CONSULTS
"Diabetes Education  Assessment/Teaching    Patient Name:  Jim Monatño  YOB: 1926  MRN: [id number]  Admit Date:  3/13/2021      Assessment Date:  3/15/2021    Most Recent Value   General Information    Referral From:  -- [Pt seen due to DM on problem list]   Height  177.8 cm (70\")   Height Method  Stated   Weight  82.9 kg (182 lb 11.2 oz)   Weight Method  Bed scale   Pregnancy Assessment   Diabetes History   What type of diabetes do you have?  Type 2   Length of Diabetes Diagnosis  -- [pt states dx 10-12 years ago]   Have you had diabetes education/teaching in the past?  yes   When and where was your diabetes education?  Pt received education as inpatient at [facility] on 11/24/2020   Do you test your blood sugar at home?  yes   Frequency of checks  daily in am   Meter type  unsure of name   Who performs the test?  self   Have you had low blood sugar? (<70mg/dl)  yes   How often do you have low blood sugar?  occasionally   Education Preferences   What areas of diabetes would you like to learn about?  avoiding high blood sugar, avoiding low blood sugar, diabetes complications, testing my blood sugar at home   Nutrition Information   Assessment Topics   Problem Solving - Assessment  Needs education   Reducing Risk - Assessment  Needs education   Monitoring - Assessment  Needs education   DM Goals   Problem Solving - Goal  Today   Reducing Risk - Goal  Today   Monitoring - Goal  Today            Most Recent Value   DM Education Needs   Meter  Has own   Frequency of Testing  Daily [Discussed importance of rotating the times when he checks bs and recording bs and taking to MD visits.]   Blood Glucose Target Range  Discussed healthy bs range. MD wanting pt's bs between 100-200. Pt states his am readings have been within this range.   Medication  Insulin, Pen [Pt gives Lantus 45 units hs and Novolog 15 units ac.]   Problem Solving  Hypoglycemia, Hyperglycemia, Signs, Symptoms, Treatment [Discussed importance " of always keeping low bs tx with him]   Reducing Risks  A1C testing   Healthy Eating  -- [Pt eating 3 meals/day]   Motivation  Engaged   Teaching Method  Explanation, Discussion   Patient Response  Verbalized understanding            Other Comments:  Pt sees Dr. CHECO Corona at the [facility]. Last appt was 11/16/2020 and he is scheduled for follow up 5/11/21. Pt lives alone and has caregivers with him 6 days/week. He states he gives own insulin injections and checks his own bs. Encouraged pt to contact MD if bs running outside range of 100-200. Pt verbalized understanding of information covered.       Electronically signed by:  Nelia Mckenzie RN  03/15/21 20:37 EDT

## 2021-03-16 NOTE — PLAN OF CARE
Problem: Adult Inpatient Plan of Care  Goal: Absence of Hospital-Acquired Illness or Injury  Intervention: Identify and Manage Fall Risk  Recent Flowsheet Documentation  Taken 3/16/2021 0310 by Fuentes Hernandez LPN  Safety Promotion/Fall Prevention:   safety round/check completed   room organization consistent   nonskid shoes/slippers when out of bed   muscle strengthening facilitated   mobility aid in reach   lighting adjusted   fall prevention program maintained   elopement precautions   clutter free environment maintained   assistive device/personal items within reach   activity supervised  Taken 3/16/2021 0110 by Fuentes Hernandez LPN  Safety Promotion/Fall Prevention:   safety round/check completed   room organization consistent   nonskid shoes/slippers when out of bed   muscle strengthening facilitated   mobility aid in reach   lighting adjusted   fall prevention program maintained   elopement precautions   clutter free environment maintained   assistive device/personal items within reach   activity supervised  Taken 3/15/2021 2310 by Fuentes Hernandez LPN  Safety Promotion/Fall Prevention:   nonskid shoes/slippers when out of bed   muscle strengthening facilitated   mobility aid in reach   room organization consistent   safety round/check completed   toileting scheduled   lighting adjusted   activity supervised   assistive device/personal items within reach   clutter free environment maintained   fall prevention program maintained  Taken 3/15/2021 2105 by Fuentes Hernandez LPN  Safety Promotion/Fall Prevention:   safety round/check completed   room organization consistent   nonskid shoes/slippers when out of bed   muscle strengthening facilitated   mobility aid in reach   lighting adjusted   activity supervised   assistive device/personal items within reach   clutter free environment maintained   elopement precautions   fall prevention program maintained  Taken 3/15/2021 1938 by Mary  Fuentes L, LPN  Safety Promotion/Fall Prevention:   safety round/check completed   room organization consistent   nonskid shoes/slippers when out of bed   muscle strengthening facilitated   mobility aid in reach   lighting adjusted   fall prevention program maintained   clutter free environment maintained   assistive device/personal items within reach   activity supervised  Intervention: Prevent Skin Injury  Recent Flowsheet Documentation  Taken 3/16/2021 0310 by Fuentes Hernandez LPN  Body Position:   position maintained   position changed independently  Taken 3/16/2021 0110 by Fuentes Hernandez LPN  Body Position:   position maintained   position changed independently  Taken 3/15/2021 2310 by Fuentes Hernandez LPN  Body Position: position changed independently  Taken 3/15/2021 2105 by Fuentes Hernandez LPN  Body Position: position changed independently  Taken 3/15/2021 1938 by Fuentes Hernandez LPN  Body Position:   position changed independently   position maintained  Intervention: Prevent Infection  Recent Flowsheet Documentation  Taken 3/16/2021 0310 by Fuentes Hernandez LPN  Infection Prevention:   visitors restricted/screened   single patient room provided   rest/sleep promoted   personal protective equipment utilized   hand hygiene promoted  Taken 3/16/2021 0110 by Fuentes Hernandez LPN  Infection Prevention:   visitors restricted/screened   single patient room provided   rest/sleep promoted   personal protective equipment utilized   hand hygiene promoted  Taken 3/15/2021 2310 by Fuentes Hernandez LPN  Infection Prevention:   visitors restricted/screened   single patient room provided   rest/sleep promoted   hand hygiene promoted   personal protective equipment utilized  Taken 3/15/2021 2105 by Fuentes Hernandez LPN  Infection Prevention: hand hygiene promoted  Taken 3/15/2021 1938 by Fuentes Hernandez LPN  Infection Prevention:   hand hygiene promoted   equipment surfaces  disinfected   personal protective equipment utilized   rest/sleep promoted   single patient room provided  Goal: Optimal Comfort and Wellbeing  Intervention: Provide Person-Centered Care  Recent Flowsheet Documentation  Taken 3/15/2021 1938 by Fuentes Hernandez LPN  Trust Relationship/Rapport: care explained     Problem: Fall Injury Risk  Goal: Absence of Fall and Fall-Related Injury  Intervention: Identify and Manage Contributors to Fall Injury Risk  Recent Flowsheet Documentation  Taken 3/16/2021 0310 by Fuentes Hernandez LPN  Medication Review/Management: medications reviewed  Taken 3/16/2021 0110 by Fuentes Hernandez LPN  Medication Review/Management: medications reviewed  Taken 3/15/2021 2310 by Fuentes Hernandez LPN  Medication Review/Management: medications reviewed  Taken 3/15/2021 2105 by Fuentes Hernandez LPN  Medication Review/Management: medications reviewed  Taken 3/15/2021 1938 by Fuentes Hernandez LPN  Medication Review/Management: medications reviewed  Intervention: Promote Injury-Free Environment  Recent Flowsheet Documentation  Taken 3/16/2021 0310 by Fuentes Hernandez LPN  Safety Promotion/Fall Prevention:   safety round/check completed   room organization consistent   nonskid shoes/slippers when out of bed   muscle strengthening facilitated   mobility aid in reach   lighting adjusted   fall prevention program maintained   elopement precautions   clutter free environment maintained   assistive device/personal items within reach   activity supervised  Taken 3/16/2021 0110 by Fuentes Hernandez LPN  Safety Promotion/Fall Prevention:   safety round/check completed   room organization consistent   nonskid shoes/slippers when out of bed   muscle strengthening facilitated   mobility aid in reach   lighting adjusted   fall prevention program maintained   elopement precautions   clutter free environment maintained   assistive device/personal items within reach   activity  supervised  Taken 3/15/2021 2310 by Fuentes Hernandez LPN  Safety Promotion/Fall Prevention:   nonskid shoes/slippers when out of bed   muscle strengthening facilitated   mobility aid in reach   room organization consistent   safety round/check completed   toileting scheduled   lighting adjusted   activity supervised   assistive device/personal items within reach   clutter free environment maintained   fall prevention program maintained  Taken 3/15/2021 2105 by Fuentes Hernandez LPN  Safety Promotion/Fall Prevention:   safety round/check completed   room organization consistent   nonskid shoes/slippers when out of bed   muscle strengthening facilitated   mobility aid in reach   lighting adjusted   activity supervised   assistive device/personal items within reach   clutter free environment maintained   elopement precautions   fall prevention program maintained  Taken 3/15/2021 1938 by Fuentes Hernandez LPN  Safety Promotion/Fall Prevention:   safety round/check completed   room organization consistent   nonskid shoes/slippers when out of bed   muscle strengthening facilitated   mobility aid in reach   lighting adjusted   fall prevention program maintained   clutter free environment maintained   assistive device/personal items within reach   activity supervised   Goal Outcome Evaluation:

## 2021-03-16 NOTE — PLAN OF CARE
Goal Outcome Evaluation:        Outcome Summary: Patient Alert and oriented with some confusion at t imes. Pt denies pain at this time and continues to tolerate diet, no Nausea noted, Pt continues to be monitored d/t episode of blood in stool early this day. Hgb holding at 8.1, VS stable at this time.

## 2021-03-17 NOTE — NURSING NOTE
Spoke with daughter about patients discharge, will call daughter when discharge is completed so daughter can set up transport.

## 2021-03-17 NOTE — PLAN OF CARE
Problem: Adult Inpatient Plan of Care  Goal: Absence of Hospital-Acquired Illness or Injury  Intervention: Identify and Manage Fall Risk  Recent Flowsheet Documentation  Taken 3/17/2021 0310 by Fuentes Hernandez LPN  Safety Promotion/Fall Prevention:   safety round/check completed   room organization consistent   nonskid shoes/slippers when out of bed   muscle strengthening facilitated   mobility aid in reach   lighting adjusted   elopement precautions   fall prevention program maintained   clutter free environment maintained   assistive device/personal items within reach  Taken 3/17/2021 0102 by Fuentes Hernandez LPN  Safety Promotion/Fall Prevention:   safety round/check completed   room organization consistent   nonskid shoes/slippers when out of bed   mobility aid in reach   muscle strengthening facilitated   fall prevention program maintained   lighting adjusted   activity supervised   assistive device/personal items within reach   clutter free environment maintained  Taken 3/16/2021 2310 by Fuentes Hernandez LPN  Safety Promotion/Fall Prevention:   toileting scheduled   safety round/check completed   room organization consistent   nonskid shoes/slippers when out of bed   muscle strengthening facilitated   mobility aid in reach   lighting adjusted   fall prevention program maintained   clutter free environment maintained   assistive device/personal items within reach  Taken 3/16/2021 2102 by Fuentes Hernandez LPN  Safety Promotion/Fall Prevention:   safety round/check completed   room organization consistent   nonskid shoes/slippers when out of bed   muscle strengthening facilitated   mobility aid in reach   lighting adjusted   fall prevention program maintained   assistive device/personal items within reach   activity supervised   clutter free environment maintained  Taken 3/16/2021 1925 by Fuentes Hernandez LPN  Safety Promotion/Fall Prevention:   safety round/check completed   room  organization consistent   nonskid shoes/slippers when out of bed   muscle strengthening facilitated   lighting adjusted   mobility aid in reach   gait belt   fall prevention program maintained   clutter free environment maintained   assistive device/personal items within reach   activity supervised  Intervention: Prevent Skin Injury  Recent Flowsheet Documentation  Taken 3/17/2021 0310 by Fuentes Hernandez LPN  Body Position: position changed independently  Taken 3/17/2021 0102 by Fuentes Hernandez LPN  Body Position: position changed independently  Taken 3/16/2021 2310 by Fuentes Hernandez LPN  Body Position: position changed independently  Taken 3/16/2021 2102 by Fuentes Hernandez LPN  Body Position: position changed independently  Taken 3/16/2021 1925 by Fuentes Hernandez LPN  Body Position: position changed independently  Intervention: Prevent Infection  Recent Flowsheet Documentation  Taken 3/17/2021 0310 by Fuentes Hernandez LPN  Infection Prevention:   visitors restricted/screened   single patient room provided   rest/sleep promoted   personal protective equipment utilized  Taken 3/17/2021 0102 by Fuentes Hernandez LPN  Infection Prevention:   visitors restricted/screened   single patient room provided   personal protective equipment utilized   rest/sleep promoted  Taken 3/16/2021 2310 by Fuentes Hernandez LPN  Infection Prevention:   visitors restricted/screened   single patient room provided   rest/sleep promoted   personal protective equipment utilized   hand hygiene promoted  Taken 3/16/2021 2102 by Fuentes Hernandez LPN  Infection Prevention:   visitors restricted/screened   single patient room provided   rest/sleep promoted   personal protective equipment utilized   hand hygiene promoted  Taken 3/16/2021 1925 by Fuentes Hernandez LPN  Infection Prevention:   visitors restricted/screened   single patient room provided   rest/sleep promoted   personal protective equipment  utilized   hand hygiene promoted     Problem: Fall Injury Risk  Goal: Absence of Fall and Fall-Related Injury  Intervention: Identify and Manage Contributors to Fall Injury Risk  Recent Flowsheet Documentation  Taken 3/17/2021 0310 by Fuentes Hernandez LPN  Medication Review/Management: medications reviewed  Taken 3/17/2021 0102 by Fuentes Hernandez LPN  Medication Review/Management: medications reviewed  Taken 3/16/2021 2310 by Fuentes Hernandez LPN  Medication Review/Management: medications reviewed  Taken 3/16/2021 2102 by Fuentes Hernandez LPN  Medication Review/Management: medications reviewed  Taken 3/16/2021 1925 by Fuentes Hernandez LPN  Medication Review/Management: medications reviewed  Intervention: Promote Injury-Free Environment  Recent Flowsheet Documentation  Taken 3/17/2021 0310 by Fuentes Hernandez LPN  Safety Promotion/Fall Prevention:   safety round/check completed   room organization consistent   nonskid shoes/slippers when out of bed   muscle strengthening facilitated   mobility aid in reach   lighting adjusted   elopement precautions   fall prevention program maintained   clutter free environment maintained   assistive device/personal items within reach  Taken 3/17/2021 0102 by Fuentes Hernandez LPN  Safety Promotion/Fall Prevention:   safety round/check completed   room organization consistent   nonskid shoes/slippers when out of bed   mobility aid in reach   muscle strengthening facilitated   fall prevention program maintained   lighting adjusted   activity supervised   assistive device/personal items within reach   clutter free environment maintained  Taken 3/16/2021 2310 by Fuentes Hernandez LPN  Safety Promotion/Fall Prevention:   toileting scheduled   safety round/check completed   room organization consistent   nonskid shoes/slippers when out of bed   muscle strengthening facilitated   mobility aid in reach   lighting adjusted   fall prevention program  maintained   clutter free environment maintained   assistive device/personal items within reach  Taken 3/16/2021 2102 by Fuentes Hernandez LPN  Safety Promotion/Fall Prevention:   safety round/check completed   room organization consistent   nonskid shoes/slippers when out of bed   muscle strengthening facilitated   mobility aid in reach   lighting adjusted   fall prevention program maintained   assistive device/personal items within reach   activity supervised   clutter free environment maintained  Taken 3/16/2021 1925 by Fuentes Hernandez LPN  Safety Promotion/Fall Prevention:   safety round/check completed   room organization consistent   nonskid shoes/slippers when out of bed   muscle strengthening facilitated   lighting adjusted   mobility aid in reach   gait belt   fall prevention program maintained   clutter free environment maintained   assistive device/personal items within reach   activity supervised   Goal Outcome Evaluation:

## 2021-03-17 NOTE — PLAN OF CARE
Goal Outcome Evaluation:        Outcome Summary: Pt alert and oriented, Pt to discharge back to [facility] this day. Daughter Poonam notified. VS stable.

## 2021-03-17 NOTE — DISCHARGE SUMMARY
Date of Discharge:  3/17/2021    Discharge Diagnosis:     Acute lower GI hemorrhage secondary to diverticular bleed status post elective colonoscopy with acute endoscopic hemostasis with epinephrine and placement of a single Hemoclip 3/14/2021  Iron deficiency  Acute blood loss anemia superimposed upon ACD, B12 deficiency, and iron deficiency  Diabetes mellitus type 2 with neuropathic, renal and angiopathic manifestations  Chronic systolic congestive heart failure / CHFrEF 27%  Cerebrovascular disease status post CVA  Seizure disorder  Diabetic dyslipidemia  Chronic kidney disease stage IIIa secondary to hypertensive nephropathy and DGS  History of colon cancer  History of right hemicolectomy  Secondary thrombophilia  Atrial fibrillation, chronic  Gastroesophageal reflux disease without gastritis  Degenerative disc disease lumbosacral spine  Degenerative joint disease  Vitamin B12 deficiency  Chronic insulin use  BPH with LUTS  Chronic pancreatitis  Coronary artery calcifications  LVH  Pulmonary hypertension  Hypertension associated with chronic kidney disease stage IIIa  Atherosclerotic heart disease of native coronary arteries with angina pectoris    Presenting Problem/History of Present Illness  Active Hospital Problems    Diagnosis  POA   • **Gastrointestinal hemorrhage [K92.2]  Yes      Resolved Hospital Problems   No resolved problems to display.        Hospital Course  Patient is a 89 y.o. male presented with acute lower GI bleeding.  The patient was evaluated by gastroenterology and underwent elective colonoscopy with endoscopic sclerotherapy and endoscopic clipping.  The patient was monitored and volume was augmented.  The patient did well but continued to have flor bloody stools and was observed given his comorbidities and age.  His hemoglobin became quite stable and he was deemed stable for discharge home today with medications per the discharge reconciliation.  Will recommend holding any anticoagulation  specifically aspirin for at least 7 days from the time of discharge and will recommend close outpatient follow-up with gastroenterology within 1 to 2 weeks time or sooner as necessary.  The patient will also follow-up with us within the office in approximately 1 week's time and we will obtain serology at that point.  He will call with any decompensation.  He will call with any return of GI bleeding abdominal pain postural instability or other constitutional complaint.    Procedures Performed    Procedure(s):  COLONOSCOPY with endoscopic sclerotherapy and endoscopic clipping  -------------------       Consults:   Consults     Date and Time Order Name Status Description    3/13/2021 11:04 PM Inpatient Gastroenterology Consult      3/13/2021  9:34 PM Family Medicine Consult Completed           Pertinent Test Results:CT Abdomen Pelvis With Contrast    Result Date: 3/13/2021  1.  No acute findings. 2.  Evidence of chronic pancreatitis. Electronically signed by:  Audrey Song  3/13/2021 7:29 PM      Imaging Results (Last 7 Days)     Procedure Component Value Units Date/Time    CT Abdomen Pelvis With Contrast [[id number]] Collected: 03/13/21 2120     Updated: 03/13/21 2130    Narrative:      EXAM: CT SCAN OF THE ABDOMEN AND PELVIS WITH INTRAVENOUS CONTRAST  DATE: 3/13/2021 8:57 PM    HISTORY: Rectal bleeding, history colon cancer  TECHNIQUE:  CT examination of the abdomen and pelvis was performed following the intravenous administration of 100 mL Isovue-370 . CT dose lowering techniques were used, to include: automated exposure control, adjustment for patient size, and/or use of   iterative reconstruction.  COMPARISON:  1/24/2020.    FINDINGS:  ABDOMEN/PELVIS:  Lower Chest: Multilevel degenerative disc  Liver: Liver cyst..    Gallbladder/Biliary: Normal gallbladder  Pancreas: Coarse calcifications along the atrophic pancreas.  Spleen: Normal.    Adrenal Glands: Normal.   Kidneys/Ureters/Bladder: Renal cysts. Normal  bladder  GI Tract: Moderate hiatus hernia. Diverticulosis without diverticulitis. Suture near the cecum suggest appendectomy.  Mesentery/Peritoneum: Normal mesentery  Reproductive: Normal pelvis.  Vasculature: Normal.     Lymph Nodes: Stable lymph node or varix just to the right of the right common iliac artery (image 90).  Abdominal Wall: Normal.   Musculoskeletal: T12 hemangioma.      Impression:        1.  No acute findings.    2.  Evidence of chronic pancreatitis.    Electronically signed by:  Audrey Song    3/13/2021 7:29 PM              Condition on Discharge:  Fair    Vital Signs  Temp:  [97.6 °F (36.4 °C)-98.3 °F (36.8 °C)] 97.6 °F (36.4 °C)  Heart Rate:  [82-98] 82  Resp:  [18-20] 20  BP: (123-146)/(69-78) 123/69    Physical Exam:     General Appearance:    Alert, cooperative, in no acute distress   Head:    Normocephalic, without obvious abnormality, atraumatic   Eyes:           Conjunctivae and sclerae normal, no   icterus, no pallor, corneas clear, PERRLA   Throat:   No oral lesions, no thrush, oral mucosa moist   Neck:   No adenopathy, supple, trachea midline, no thyromegaly, no   carotid bruit, no JVD   Lungs:     Clear to auscultation,respirations regular, even and                  unlabored    Heart:    Regular rhythm and normal rate, normal S1 and S2, no            murmur, no gallop, no rub, no click   Chest Wall:    No abnormalities observed   Abdomen:     Normal bowel sounds, no masses, no organomegaly, soft        non-tender, non-distended, no guarding, no rebound                tenderness   Rectal:     Deferred   Extremities:   Moves all extremities well, no edema, no cyanosis, no             redness   Pulses:   Pulses palpable and equal bilaterally   Skin:   No bleeding, bruising or rash   Lymph nodes:   No palpable adenopathy   Neurologic:   Cranial nerves 2 - 12 grossly intact, sensation intact, DTR       present and equal bilaterally         Discharge Disposition  Long Term Care (DC -  External)    Discharge Medications     Discharge Medications      New Medications      Instructions Start Date   famotidine 40 MG tablet  Commonly known as: PEPCID   40 mg, Oral, Daily         Continue These Medications      Instructions Start Date   Aspir-Low 81 MG EC tablet  Generic drug: aspirin   81 mg, Oral, Daily      BASAGLAR KWIKPEN SC   45 Units, Subcutaneous, Nightly      bisoprolol 5 MG tablet  Commonly known as: ZEBeta   5 mg, Oral, Daily      escitalopram 10 MG tablet  Commonly known as: LEXAPRO   10 mg, Oral, Daily      finasteride 5 MG tablet  Commonly known as: PROSCAR   5 mg, Oral, Daily      furosemide 20 MG tablet  Commonly known as: LASIX   40 mg, Oral, Daily      insulin aspart 100 UNIT/ML injection  Commonly known as: novoLOG   15 Units, Subcutaneous, 3 Times Daily Before Meals      levETIRAcetam  MG 24 hr tablet  Commonly known as: KEPPRA XR   2,000 mg, Oral, Daily      potassium chloride 20 MEQ CR tablet  Commonly known as: K-DUR,KLOR-CON   40 mEq, Oral, Daily      pravastatin 40 MG tablet  Commonly known as: PRAVACHOL   40 mg, Oral, Daily      vitamin D3 125 MCG (5000 UT) capsule capsule   5,000 Units, Oral, Daily             Discharge Diet:   Cardiac  Activity at Discharge:   As tolerated  Follow-up Appointments  Please refer to the body of the discharge summary  Future Appointments   Date Time Provider Department Center   5/4/2021 10:30 AM LAB  [facility] None   5/11/2021  2:15 PM Meena Corona MD [facility] None         Test Results Pending at Discharge  Follow-up CBC     John Burleson MD  03/17/21  06:54 EDT

## 2021-06-20 PROBLEM — E16.2 HYPOGLYCEMIA: Status: ACTIVE | Noted: 2021-01-01

## 2021-06-20 NOTE — PLAN OF CARE
Goal Outcome Evaluation:  Plan of Care Reviewed With: patient        Progress: improving     Patient admitted from [facility] for altered mental status and low blood sugar of 22.  Blood sugars are now stable and patient is more alert.  Fluids started.  Blood sugar check every one hour.

## 2021-06-20 NOTE — ED NOTES
Pt lives at [facility], family last saw him at 1830, family stated kept trying to get him up and would not. Family finally called EMS and sugar was 22 when they arrived. Gave him 1/2 amp D50 and sugar was 156, on arrival to ED sugar was 97     Briana Phillips, RN  06/19/21 1974

## 2021-06-20 NOTE — PLAN OF CARE
Goal Outcome Evaluation:  Plan of Care Reviewed With: patient, family        Progress: improving   Pt alert and appropriate.  No c/o pain or shortness of breath.  Eating about 50% of meals.  Fall precautions maintained.  Dr. Tapia aware of elevated troponins and VT.  Dr. Gomez informed of VT also.  Family visited and aware of blood glucose trending and elevated troponin.  In no acute apparent distress.  Reminded pt to stay of sore area on buttock.

## 2021-06-20 NOTE — H&P
"    Patient Care Team:  John Burleson MD as PCP - General  John Burleson MD as PCP - Family Medicine    Chief complaint \"I am just tired\"    Subjective     Patient is a 89 y.o. male with history of chronic atrial fibrillation and type 2 diabetes who was noted yesterday to be lethargic by his family.  EMS was called and his blood sugar was found to be 22.  He presumably had taken his Lantus the night before but had not eaten or drank much during the day.  Blood sugar responded to IV D50 but then remained low overnight, requiring glucose containing IV fluids.  This morning he is awake and alert but quite fatigued.  He denies any chest pain, shortness of breath, dizziness, nausea or other specific problems.  Troponin was elevated last night and has trended upward this morning.     Review of Systems   Constitutional: Positive for activity change, appetite change and fatigue. Negative for chills, diaphoresis and fever.   HENT: Negative for facial swelling.    Eyes: Negative for visual disturbance.   Respiratory: Negative for cough, shortness of breath, wheezing and stridor.    Cardiovascular: Negative for chest pain, palpitations and leg swelling.   Gastrointestinal: Negative for abdominal pain, constipation, diarrhea, nausea and vomiting.   Endocrine: Negative for polyuria.   Genitourinary: Negative for dysuria, enuresis and frequency.   Musculoskeletal: Positive for arthralgias.   Skin: Negative for rash.   Neurological: Positive for weakness. Negative for dizziness, tremors, seizures, syncope, speech difficulty and headaches.   Psychiatric/Behavioral: Negative for confusion.          History  Past Medical History:   Diagnosis Date   • A-fib (CMS/HCC)    • Cancer (CMS/HCC)     colon   • Hypertension    • Type 2 diabetes mellitus (CMS/HCC)      Past Surgical History:   Procedure Laterality Date   • COLONOSCOPY N/A 3/14/2021    Procedure: COLONOSCOPY with endoscopic sclerotherapy and endoscopic clipping;  Surgeon: " Goldy Healy MD;  Location: [facility] ENDOSCOPY;  Service: Gastroenterology;  Laterality: N/A;  post: diverticulosis, post surgical changes   • PROSTATE SURGERY       Family History   Problem Relation Age of Onset   • Cancer Father         multipule mylomia      Social History     Tobacco Use   • Smoking status: Never Smoker   • Smokeless tobacco: Never Used   Substance Use Topics   • Alcohol use: Not Currently   • Drug use: Never     Medications Prior to Admission   Medication Sig Dispense Refill Last Dose   • aspirin (Aspir-Low) 81 MG EC tablet Take 81 mg by mouth Daily.      • bisoprolol (ZEBeta) 5 MG tablet Take 5 mg by mouth Daily.      • Cholecalciferol (vitamin D3) 125 MCG (5000 UT) capsule capsule Take 5,000 Units by mouth Daily.      • escitalopram (LEXAPRO) 10 MG tablet Take 10 mg by mouth Daily.      • famotidine (PEPCID) 40 MG tablet Take 1 tablet by mouth Daily. 30 tablet 3    • finasteride (PROSCAR) 5 MG tablet Take 5 mg by mouth Daily.      • furosemide (LASIX) 20 MG tablet Take 40 mg by mouth Daily.      • insulin aspart (novoLOG) 100 UNIT/ML injection Inject 15 Units under the skin into the appropriate area as directed 3 (Three) Times a Day Before Meals.      • Insulin Glargine (BASAGLAR KWIKPEN SC) Inject 45 Units under the skin into the appropriate area as directed Every Night.      • levETIRAcetam XR (KEPPRA XR) 500 MG 24 hr tablet Take 2,000 mg by mouth Daily.      • potassium chloride (K-DUR,KLOR-CON) 20 MEQ CR tablet Take 40 mEq by mouth Daily.      • pravastatin (PRAVACHOL) 40 MG tablet Take 40 mg by mouth Daily.        Allergies:  Patient has no known allergies.    Objective     Vital Signs  Temp:  [94.7 °F (34.8 °C)-97.4 °F (36.3 °C)] 97.4 °F (36.3 °C)  Heart Rate:  [] 110  Resp:  [16-20] 20  BP: (101-138)/(57-91) 117/74     Physical Exam:      General Appearance:    Alert, cooperative, in no acute distress, frail, oriented   Head:    Normocephalic, without obvious abnormality,  atraumatic   Eyes:            Lids and lashes normal, conjunctivae and sclerae normal, no   icterus, no pallor, corneas clear, PERRLA   Ears:    Ears appear intact with no abnormalities noted   Throat:   No oral lesions, no thrush, oral mucosa moist   Neck:   No adenopathy, supple, trachea midline, no thyromegaly, no   carotid bruit, no JVD   Lungs:     Clear to auscultation,respirations regular, even and                  unlabored    Heart:    Irregularly irregular   Chest Wall:    No abnormalities observed   Abdomen:     Normal bowel sounds, no masses, no organomegaly, soft        non-tender, non-distended, no guarding, no rebound                tenderness   Extremities:   1+ bilateral lower ext edema   Pulses:   Pulses palpable and equal bilaterally   Skin:   No bleeding, bruising or rash   Lymph nodes:   No palpable adenopathy   Neurologic:   Cranial nerves 2 - 12 grossly intact, sensation intact, DTR       present and equal bilaterally; gait not assessed.       Results Review:     Imaging Results (Last 24 Hours)     Procedure Component Value Units Date/Time    CT Head Without Contrast [[id number]] Collected: 06/20/21 0243     Updated: 06/20/21 0246    Narrative:      EXAMINATION: CT HEAD WITHOUT CONTRAST    DATE: 6/20/2021 2:05 AM    INDICATION: Lethargy    COMPARISON: CT head 8/24/2020    TECHNIQUE: Noncontrast imaging obtained from the vertex to the skull base.  CT dose lowering techniques were used, to include: automated exposure control, adjustment for patient size, and or use of iterative reconstruction.?    FINDINGS:    Soft Tissues: Mild right facial soft tissue swelling, nonspecific.    Skull: No underlying skull fracture or radiopaque foreign body.    Sinuses: Paranasal sinuses are clear.    Mastoids: Mastoid air cells are clear.     Globes and Orbits: Globes and orbits are intact.    Brain: No acute hemorrhage.  No midline shift, masses, or mass effect.  No evidence of acute infarct by noncontrast  CT.    Ventricles and Cisterns: Ventricular size and configuration is within normal limits. Basal cisterns are patent. No abnormal extra-axial fluid collection.    Senescent Changes: Moderate volume loss and chronic microvascular ischemic changes. Arteriosclerosis. 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        Impression:          1. No acute intracranial abnormality.    2. Moderate volume loss and chronic microvascular ischemic changes. 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    Electronically signed by:  Jovanni Alcazar M.D.    6/20/2021 12:45 AM           Lab Results (last 24 hours)     Procedure Component Value Units Date/Time    Troponin [[id number]]  (Abnormal) Collected: 06/20/21 0848    Specimen: Blood Updated: 06/20/21 0952     Troponin T 0.104 ng/mL     Narrative:      Troponin T Reference Range:  <= 0.03 ng/mL-   Negative for AMI  >0.03 ng/mL-     Abnormal for myocardial necrosis.  Clinicians would have to utilize clinical acumen, EKG, Troponin and serial changes to determine if it is an Acute Myocardial Infarction or myocardial injury due to an underlying chronic condition.       Results may be falsely decreased if patient taking Biotin.      POC Glucose Once [[id number]]  (Normal) Collected: 06/20/21 0926    Specimen: Blood Updated: 06/20/21 0928     Glucose 99 mg/dL      Comment: Serial Number: [id number]:  [id number]       POC Glucose Once [[id number]]  (Normal) Collected: 06/20/21 0729    Specimen: Blood Updated: 06/20/21 0730     Glucose 104 mg/dL      Comment: Serial Number: [id number]:  [id number]       POC Glucose Once [[id number]]  (Normal) Collected: 06/20/21 0528    Specimen: Blood Updated: 06/20/21 0529     Glucose 103 mg/dL      Comment: Serial Number: [id number]:  [id number]       POC Glucose Once [[id number]]  (Normal) Collected: 06/20/21 0438    Specimen: Blood Updated: 06/20/21 0438     Glucose 79 mg/dL       Comment: Serial Number: [id number]:  [id number]       POC Glucose Once [[id number]]  (Abnormal) Collected: 06/20/21 0331    Specimen: Blood Updated: 06/20/21 0332     Glucose 56 mg/dL      Comment: Serial Number: [id number]:  [id number]       COVID PRE-OP / PRE-PROCEDURE SCREENING ORDER (NO ISOLATION) - Swab, Nasopharynx [[id number]]  (Normal) Collected: 06/20/21 0240    Specimen: Swab from Nasopharynx Updated: 06/20/21 0305    Narrative:      The following orders were created for panel order COVID PRE-OP / PRE-PROCEDURE SCREENING ORDER (NO ISOLATION) - Swab, Nasopharynx.  Procedure                               Abnormality         Status                     ---------                               -----------         ------                     COVID-19,CEPHEID,COR/SORAIDA...[[id number]]  Normal              Final result                 Please view results for these tests on the individual orders.    COVID-19,CEPHEID,COR/SORAIDA/PAD/MAMI IN-HOUSE(OR EMERGENT/ADD-ON),NP SWAB IN TRANSPORT MEDIA 3-4 HR TAT, RT-PCR - Swab, Nasopharynx [[id number]]  (Normal) Collected: 06/20/21 0240    Specimen: Swab from Nasopharynx Updated: 06/20/21 0305     COVID19 Not Detected    Narrative:      Fact sheet for providers: https://www.fda.gov/media/142729/download     Fact sheet for patients: https://www.fda.gov/media/472930/download  Fact sheet for providers: https://www.fda.gov/media/963258/download    Fact sheet for patients: https://www.fda.gov/media/860361/download    Test performed by PCR.    POC Glucose Once [[id number]]  (Abnormal) Collected: 06/20/21 0150    Specimen: Blood Updated: 06/20/21 0152     Glucose 131 mg/dL      Comment: Serial Number: [id number]:  [id number]       POC Glucose Once [[id number]]  (Normal) Collected: 06/20/21 0123    Specimen: Blood Updated: 06/20/21 0124     Glucose 75 mg/dL      Comment: Serial Number: [id number]:  [id number]       Urinalysis With Culture If Indicated - Urine, Catheter  [[id number]]  (Abnormal) Collected: 06/20/21 0041    Specimen: Urine, Catheter Updated: 06/20/21 0052     Color, UA Yellow     Appearance, UA Clear     pH, UA <=5.0     Specific Gravity, UA 1.015     Glucose, UA Negative     Ketones, UA Negative     Bilirubin, UA Negative     Blood, UA Trace     Protein, UA Negative     Leuk Esterase, UA Negative     Nitrite, UA Negative     Urobilinogen, UA 1.0 E.U./dL    Urinalysis, Microscopic Only - Urine, Catheter [[id number]]  (Abnormal) Collected: 06/20/21 0041    Specimen: Urine, Catheter Updated: 06/20/21 0052     RBC, UA 6-12 /HPF      WBC, UA 0-2 /HPF      Bacteria, UA None Seen /HPF      Squamous Epithelial Cells, UA 0-2 /HPF      Hyaline Casts, UA 3-6 /LPF      Methodology Automated Microscopy    POC Glucose Once [[id number]]  (Abnormal) Collected: 06/19/21 2346    Specimen: Blood Updated: 06/19/21 2347     Glucose 45 mg/dL      Comment: Serial Number: [id number]:  [id number]       Troponin [[id number]]  (Abnormal) Collected: 06/19/21 2244    Specimen: Blood Updated: 06/19/21 2313     Troponin T 0.057 ng/mL     Narrative:      Troponin T Reference Range:  <= 0.03 ng/mL-   Negative for AMI  >0.03 ng/mL-     Abnormal for myocardial necrosis.  Clinicians would have to utilize clinical acumen, EKG, Troponin and serial changes to determine if it is an Acute Myocardial Infarction or myocardial injury due to an underlying chronic condition.       Results may be falsely decreased if patient taking Biotin.      Comprehensive Metabolic Panel [[id number]]  (Abnormal) Collected: 06/19/21 2244    Specimen: Blood Updated: 06/19/21 2313     Glucose 58 mg/dL      BUN 33 mg/dL      Creatinine 1.83 mg/dL      Sodium 138 mmol/L      Potassium 3.6 mmol/L      Chloride 108 mmol/L      CO2 19.0 mmol/L      Calcium 8.4 mg/dL      Total Protein 6.9 g/dL      Albumin 3.20 g/dL      ALT (SGPT) 10 U/L      AST (SGOT) 15 U/L      Alkaline Phosphatase 66 U/L      Total Bilirubin 0.7 mg/dL       eGFR Non African Amer 35 mL/min/1.73      Globulin 3.7 gm/dL      A/G Ratio 0.9 g/dL      BUN/Creatinine Ratio 18.0     Anion Gap 11.0 mmol/L     Narrative:      GFR Normal >60  Chronic Kidney Disease <60  Kidney Failure <15      CBC & Differential [[id number]]  (Abnormal) Collected: 06/19/21 2244    Specimen: Blood Updated: 06/19/21 2254    Narrative:      The following orders were created for panel order CBC & Differential.  Procedure                               Abnormality         Status                     ---------                               -----------         ------                     Scan Slide[[id number]]                                                                  CBC Auto Differential[[id number]]        Abnormal            Final result                 Please view results for these tests on the individual orders.    CBC Auto Differential [[id number]]  (Abnormal) Collected: 06/19/21 2244    Specimen: Blood Updated: 06/19/21 2254     WBC 6.80 10*3/mm3      RBC 4.78 10*6/mm3      Hemoglobin 10.3 g/dL      Hematocrit 34.5 %      MCV 72.2 fL      MCH 21.6 pg      MCHC 29.9 g/dL      RDW 24.3 %      RDW-SD 61.7 fl      MPV 7.5 fL      Platelets 237 10*3/mm3      Neutrophil % 84.5 %      Lymphocyte % 8.2 %      Monocyte % 5.0 %      Eosinophil % 0.7 %      Basophil % 1.6 %      Neutrophils, Absolute 5.70 10*3/mm3      Lymphocytes, Absolute 0.60 10*3/mm3      Monocytes, Absolute 0.30 10*3/mm3      Eosinophils, Absolute 0.00 10*3/mm3      Basophils, Absolute 0.10 10*3/mm3      nRBC 0.1 /100 WBC     POC Glucose Once [[id number]]  (Normal) Collected: 06/19/21 2125    Specimen: Blood Updated: 06/19/21 2145     Glucose 97 mg/dL      Comment: Serial Number: [id number]:  [id number]              I reviewed the patient's new clinical results.    Assessment/Plan       Hypoglycemia  NSTEMI -currently not having any symptoms; adding Lovenox; asking cardiology to evaluate; echo ordered  Chronic atrial fib -rate is  controlled with bisoprolol; decision made been made previously not to anticoagulate but in the setting of elevated troponins I am going to add Lovenox  Essential hypertension -bisoprolol  Anemia -history of lower GI bleeding but no active bleeding noted currently; monitor hemoglobin closely with the addition of Lovenox  Chronic kidney disease stage IIIb -avoiding nephrotoxins  Mood disorder -continue Lexapro        I discussed the patient's findings and my recommendations with patient.     Elaine Tapia MD  06/20/21  10:43 EDT

## 2021-06-20 NOTE — ED PROVIDER NOTES
Subjective   History:    89-year-old male presented to the emergency department today via EMS for evaluation of hypoglycemia.  According to patient's family member he has been tired all day and did not want to get out of bed.  Apparently he ran errands with his son all day yesterday and was complaining of being tired.  Was not complaining of any pain anywhere or any problems other than being sleepy.  Patient has not had anything to eat or drink today.  When EMS arrived his blood sugar was found to be 22.  They gave him half an amp of D50.  His blood sugar upon arrival was in the 90s.              Review of Systems   Constitutional: Positive for activity change, appetite change and fatigue. Negative for fever.   HENT: Negative for congestion, ear pain, facial swelling, sinus pressure, sneezing and sore throat.    Eyes: Negative for pain and redness.   Respiratory: Negative for chest tightness and shortness of breath.    Cardiovascular: Negative for chest pain and palpitations.   Gastrointestinal: Negative for abdominal pain, constipation, diarrhea, nausea and vomiting.   Genitourinary: Negative for difficulty urinating and dysuria.   Musculoskeletal: Negative for arthralgias, gait problem, joint swelling, neck pain and neck stiffness.   Skin: Negative for color change and rash.   Neurological: Positive for weakness. Negative for dizziness, tremors, seizures, syncope, facial asymmetry, speech difficulty, light-headedness, numbness and headaches.   Psychiatric/Behavioral: Negative for agitation and confusion. The patient is not nervous/anxious.        Past Medical History:   Diagnosis Date   • A-fib (CMS/HCC)    • Cancer (CMS/HCC)     colon   • Hypertension    • Type 2 diabetes mellitus (CMS/HCC)        No Known Allergies    Past Surgical History:   Procedure Laterality Date   • COLONOSCOPY N/A 3/14/2021    Procedure: COLONOSCOPY with endoscopic sclerotherapy and endoscopic clipping;  Surgeon: Goldy Healy MD;   Location: [facility];  Service: Gastroenterology;  Laterality: N/A;  post: diverticulosis, post surgical changes   • PROSTATE SURGERY         Family History   Problem Relation Age of Onset   • Cancer Father         multipule mylomia        Social History     Socioeconomic History   • Marital status:      Spouse name: Not on file   • Number of children: Not on file   • Years of education: Not on file   • Highest education level: Not on file   Tobacco Use   • Smoking status: Never Smoker   • Smokeless tobacco: Never Used   Substance and Sexual Activity   • Alcohol use: Not Currently   • Drug use: Never           Objective   Physical Exam  Constitutional:       Appearance: He is normal weight. He is not ill-appearing or toxic-appearing.   HENT:      Head: Normocephalic and atraumatic.      Nose: Nose normal.      Mouth/Throat:      Mouth: Mucous membranes are dry.   Eyes:      Extraocular Movements: Extraocular movements intact.      Conjunctiva/sclera: Conjunctivae normal.      Pupils: Pupils are equal, round, and reactive to light.   Cardiovascular:      Rate and Rhythm: Normal rate. Rhythm irregular.      Pulses: Normal pulses.      Heart sounds: Normal heart sounds.   Pulmonary:      Effort: Pulmonary effort is normal.      Breath sounds: Normal breath sounds.   Abdominal:      General: Bowel sounds are normal. There is no distension.      Palpations: Abdomen is soft.      Tenderness: There is no abdominal tenderness. There is no guarding.   Musculoskeletal:         General: No swelling, tenderness, deformity or signs of injury. Normal range of motion.      Cervical back: Normal range of motion and neck supple. No tenderness.   Lymphadenopathy:      Cervical: No cervical adenopathy.   Skin:     General: Skin is warm and dry.      Capillary Refill: Capillary refill takes less than 2 seconds.      Coloration: Skin is pale.   Neurological:      Mental Status: He is oriented to person, place, and time. He  is lethargic.      GCS: GCS eye subscore is 3. GCS verbal subscore is 5. GCS motor subscore is 6.      Cranial Nerves: Cranial nerves are intact.      Motor: Weakness present.   Psychiatric:         Behavior: Behavior is cooperative.         Procedures           ED Course      Medications   sodium chloride 0.9 % flush 10 mL (has no administration in time range)   dextrose 5 % and sodium chloride 0.45 % infusion (125 mL/hr Intravenous Currently Infusing 6/20/21 0135)   dextrose (D50W) 25 g/ 50mL Intravenous Solution 25 g (25 g Intravenous Not Given 6/19/21 2352)   sodium chloride 0.9 % flush 10 mL (has no administration in time range)   dextrose (GLUTOSE) oral gel 15 g (has no administration in time range)   dextrose (D50W) 25 g/ 50mL Intravenous Solution 25 g (has no administration in time range)   glucagon (human recombinant) (GLUCAGEN DIAGNOSTIC) injection 1 mg (has no administration in time range)   dextrose (D50W) 25 g/ 50mL Intravenous Solution 50 mL (50 mL Intravenous Given 6/19/21 2352)   dextrose (D50W) 25 g/ 50mL Intravenous Solution 25 g (25 g Intravenous Given 6/20/21 0129)     Labs Reviewed   COMPREHENSIVE METABOLIC PANEL - Abnormal; Notable for the following components:       Result Value    Glucose 58 (*)     BUN 33 (*)     Creatinine 1.83 (*)     Chloride 108 (*)     CO2 19.0 (*)     Albumin 3.20 (*)     eGFR Non  Amer 35 (*)     All other components within normal limits    Narrative:     GFR Normal >60  Chronic Kidney Disease <60  Kidney Failure <15     URINALYSIS W/ CULTURE IF INDICATED - Abnormal; Notable for the following components:    Blood, UA Trace (*)     All other components within normal limits   TROPONIN (IN-HOUSE) - Abnormal; Notable for the following components:    Troponin T 0.057 (*)     All other components within normal limits    Narrative:     Troponin T Reference Range:  <= 0.03 ng/mL-   Negative for AMI  >0.03 ng/mL-     Abnormal for myocardial necrosis.  Clinicians would  have to utilize clinical acumen, EKG, Troponin and serial changes to determine if it is an Acute Myocardial Infarction or myocardial injury due to an underlying chronic condition.       Results may be falsely decreased if patient taking Biotin.     CBC WITH AUTO DIFFERENTIAL - Abnormal; Notable for the following components:    Hemoglobin 10.3 (*)     Hematocrit 34.5 (*)     MCV 72.2 (*)     MCH 21.6 (*)     MCHC 29.9 (*)     RDW 24.3 (*)     RDW-SD 61.7 (*)     Neutrophil % 84.5 (*)     Lymphocyte % 8.2 (*)     Basophil % 1.6 (*)     Lymphocytes, Absolute 0.60 (*)     All other components within normal limits   URINALYSIS, MICROSCOPIC ONLY - Abnormal; Notable for the following components:    RBC, UA 6-12 (*)     WBC, UA 0-2 (*)     All other components within normal limits   POCT GLUCOSE FINGERSTICK - Abnormal; Notable for the following components:    Glucose 45 (*)     All other components within normal limits   POCT GLUCOSE FINGERSTICK - Normal   POCT GLUCOSE FINGERSTICK - Normal   POCT GLUCOSE FINGERSTICK   POCT GLUCOSE FINGERSTICK   POCT GLUCOSE FINGERSTICK   POCT GLUCOSE FINGERSTICK   POCT GLUCOSE FINGERSTICK   POCT GLUCOSE FINGERSTICK   POCT GLUCOSE FINGERSTICK   POCT GLUCOSE FINGERSTICK   POCT GLUCOSE FINGERSTICK   POCT GLUCOSE FINGERSTICK   POCT GLUCOSE FINGERSTICK   POCT GLUCOSE FINGERSTICK   POCT GLUCOSE FINGERSTICK   POCT GLUCOSE FINGERSTICK   POCT GLUCOSE FINGERSTICK   POCT GLUCOSE FINGERSTICK   POCT GLUCOSE FINGERSTICK   POCT GLUCOSE FINGERSTICK   POCT GLUCOSE FINGERSTICK   POCT GLUCOSE FINGERSTICK   POCT GLUCOSE FINGERSTICK   POCT GLUCOSE FINGERSTICK   POCT GLUCOSE FINGERSTICK   POCT GLUCOSE FINGERSTICK   POCT GLUCOSE FINGERSTICK   POCT GLUCOSE FINGERSTICK   CBC AND DIFFERENTIAL    Narrative:     The following orders were created for panel order CBC & Differential.  Procedure                               Abnormality         Status                     ---------                               -----------          ------                     Scan Slide[[id number]]                                                                  CBC Auto Differential[[id number]]        Abnormal            Final result                 Please view results for these tests on the individual orders.     CT Head Without Contrast    (Results Pending)                                          MDM  Number of Diagnoses or Management Options  Hypoglycemia  Lethargy  Diagnosis management comments: I examined the patient using the appropriate personal protective equipment.      DISPOSITION:   Chart Review:  Comorbidity:  has a past medical history of A-fib (CMS/[facility]), Cancer (CMS/[facility]), Hypertension, and Type 2 diabetes mellitus (CMS/[facility]).    ECG: interpreted by ER physician and reviewed by myself: Atrial fibrillation  Labs: Urinalysis positive only for trace blood, BUN 33, creatinine 1.83, troponin 0 0.057, hemoglobin 10.3, WBC 6.8    Imaging: Was interpreted by physician and reviewed by myself:  No radiology results for the last day    Disposition/Treatment:    89-year-old male presented to the emergency department today via EMS for evaluation of hypoglycemia.  Apparently he had laid in bed all day because he was tired and did not want to eat and finally his family called 911 and when EMS arrived his blood sugar was 22.  Daughter-in-law reports that he has not had any medications today.  Patient lives in independent living at [facility].  Yesterday he ran a bunch of errands with his son and so they thought he was just tired from that.  He was given half an amp of D50 in route to the hospital and when he arrived his blood sugar was in the 90s.  IV was established and labs were obtained and his sugar was noted to be in the 50s.  He was given an amp of D50 then.  He was also started on D5 and a half at 125 an hour.  However his blood sugar continued to drop and he was given another amp of D50 in the emergency department.  Patient is lethargic but is  oriented x3 and denies any pain or discomfort.  He is just sleepy.  Discussed findings with daughter-in-law.  Will be admitted for further treatment of hypoglycemia.  Spoke with Dr. Ramirez who is on-call for Dr. Burleson who is in agreement with plan to admit.  Family is in agreement with admittance.       Amount and/or Complexity of Data Reviewed  Clinical lab tests: reviewed  Tests in the medicine section of CPT®: reviewed  Decide to obtain previous medical records or to obtain history from someone other than the patient: yes        Final diagnoses:   Hypoglycemia   Lethargy       ED Disposition  ED Disposition     ED Disposition Condition Comment    Decision to Admit  Level of Care: Telemetry [5]   Diagnosis: Hypoglycemia [501391]   Admitting Physician: FERMIN LOVELL [[id number]]   Attending Physician: FERMIN LOVELL [[id number]]   Isolate for COVID?: No [0]   Certification: I Certify That Inpatient Hospital Services Are Medically Necessary For Greater Than 2 Midnights            No follow-up provider specified.       Medication List      No changes were made to your prescriptions during this visit.          Abby Traylor, APRN  06/20/21 0145

## 2021-06-20 NOTE — ED NOTES
Pt O2 sats dropping when patient sleeps, Placed pt on O2 at 4L.     Family states he is supposed to wear O2 at night but unsure if he has been wearing it at home        Briana Phillips RN  06/19/21 3857

## 2021-06-20 NOTE — PROGRESS NOTES
"Pharmacy Consult - Lovenox    [facility] has been consulted for pharmacy to dose enoxaparin for NSTEMI per Dr. Tapia's request.     Patient has a PMH  of GI bleed with no active bleed -- watch Hgb closely per Dr Tapia.     Not on anticoagulate PTA.     Relevant clinical data and objective history reviewed:  89 y.o. male 177.8 cm (70\") 85.7 kg (189 lb)      Past Medical History:   Diagnosis Date    A-fib (CMS/[facility])     Cancer (CMS/[facility])     colon    Hypertension     Type 2 diabetes mellitus (CMS/[facility])      has No Known Allergies.    Lab Results   Component Value Date    WBC 6.80 06/19/2021    HGB 10.3 (L) 06/19/2021    HCT 34.5 (L) 06/19/2021    MCV 72.2 (L) 06/19/2021     06/19/2021     Lab Results   Component Value Date    GLUCOSE 58 (L) 06/19/2021    CALCIUM 8.4 06/19/2021     06/19/2021    K 3.6 06/19/2021    CO2 19.0 (L) 06/19/2021     (H) 06/19/2021    BUN 33 (H) 06/19/2021    CREATININE 1.83 (H) 06/19/2021    EGFRIFNONA 35 (L) 06/19/2021    BCR 18.0 06/19/2021    ANIONGAP 11.0 06/19/2021       Estimated Creatinine Clearance: 29.9 mL/min (A) (by C-G formula based on SCr of 1.83 mg/dL (H)).    Active Inpatient Anticoagulation Orders: none     Assessment/Plan    1) Will start patient on Lovenox 90 mg (1 mg/kg) subcutaneous every 24 hours.     2) Will monitor patient's renal function every 24 hours, platelets at least every 3 days, and adjust as needed.     3) Pharmacy will discontinue the Pharmacy to Dose Lovenox consult at this time. Renal function will continue to be monitored and dosing adjustments will be made by pharmacy based on renal function if necessary          Gabriella Heredia, [facility]    "

## 2021-06-20 NOTE — CONSULTS
Referring Provider: Dr. Tapia  Reason for Consultation: Shortness of breath and fatigue and tiredness    Patient Care Team:  John Burleson MD as PCP - General  John Burleson MD as PCP - Family Medicine    Chief complaint shortness of breath and fatigue and tiredness    Subjective .     History of present illness:  Jim Montaño is a 89 y.o. male with history of atrial fibrillation hypertension hyperlipidemia and other medical problems including colon cancer presented to the hospital with fatigue and shortness of breath.  His blood sugar was very low and patient was also lethargic.  No complaints of any chest pain palpitation dizziness syncope or swelling of the feet.  Patient responded to IV D50 but has low blood sugars throughout the night.  Patient had borderline elevated troponin and hence a cardiology consult was called.  Patient was taking all his medicines regularly.    Review of Systems   Constitutional: Positive for malaise/fatigue. Negative for fever.   HENT: Negative for ear pain and nosebleeds.    Eyes: Negative for blurred vision and double vision.   Cardiovascular: Negative for chest pain, dyspnea on exertion and palpitations.   Respiratory: Positive for shortness of breath. Negative for cough.    Skin: Negative for rash.   Musculoskeletal: Negative for joint pain.   Gastrointestinal: Negative for abdominal pain, nausea and vomiting.   Neurological: Negative for focal weakness and headaches.   Psychiatric/Behavioral: Negative for depression. The patient is not nervous/anxious.    All other systems reviewed and are negative.      History  Past Medical History:   Diagnosis Date   • A-fib (CMS/HCC)    • Cancer (CMS/HCC)     colon   • Hypertension    • Type 2 diabetes mellitus (CMS/HCC)        Past Surgical History:   Procedure Laterality Date   • COLONOSCOPY N/A 3/14/2021    Procedure: COLONOSCOPY with endoscopic sclerotherapy and endoscopic clipping;  Surgeon: Goldy Healy MD;   Location: [facility] ENDOSCOPY;  Service: Gastroenterology;  Laterality: N/A;  post: diverticulosis, post surgical changes   • PROSTATE SURGERY         Family History   Problem Relation Age of Onset   • Cancer Father         multipule mylomia        Social History     Tobacco Use   • Smoking status: Never Smoker   • Smokeless tobacco: Never Used   Substance Use Topics   • Alcohol use: Not Currently   • Drug use: Never        Medications Prior to Admission   Medication Sig Dispense Refill Last Dose   • aspirin (Aspir-Low) 81 MG EC tablet Take 81 mg by mouth Daily.      • bisoprolol (ZEBeta) 5 MG tablet Take 5 mg by mouth Daily.      • Cholecalciferol (vitamin D3) 125 MCG (5000 UT) capsule capsule Take 5,000 Units by mouth Daily.      • escitalopram (LEXAPRO) 10 MG tablet Take 10 mg by mouth Daily.      • famotidine (PEPCID) 40 MG tablet Take 1 tablet by mouth Daily. 30 tablet 3    • finasteride (PROSCAR) 5 MG tablet Take 5 mg by mouth Daily.      • furosemide (LASIX) 20 MG tablet Take 40 mg by mouth Daily.      • insulin aspart (novoLOG) 100 UNIT/ML injection Inject 15 Units under the skin into the appropriate area as directed 3 (Three) Times a Day Before Meals.      • Insulin Glargine (BASAGLAR KWIKPEN SC) Inject 45 Units under the skin into the appropriate area as directed Every Night.      • levETIRAcetam XR (KEPPRA XR) 500 MG 24 hr tablet Take 2,000 mg by mouth Daily.      • potassium chloride (K-DUR,KLOR-CON) 20 MEQ CR tablet Take 40 mEq by mouth Daily.      • pravastatin (PRAVACHOL) 40 MG tablet Take 40 mg by mouth Daily.            Patient has no known allergies.    Scheduled Meds:aspirin, 81 mg, Oral, Daily  bisoprolol, 5 mg, Oral, Daily  enoxaparin, 1 mg/kg, Subcutaneous, Q24H  escitalopram, 10 mg, Oral, Daily  finasteride, 5 mg, Oral, Daily  furosemide, 40 mg, Oral, Daily  levETIRAcetam XR, 2,000 mg, Oral, Daily  pantoprazole, 40 mg, Oral, Q AM  potassium chloride, 40 mEq, Oral, Daily  sodium chloride, 3 mL,  "Intravenous, Q12H      Continuous Infusions:Pharmacy to Dose enoxaparin (LOVENOX),       PRN Meds:.•  acetaminophen  •  dextrose  •  glucagon (human recombinant)  •  ondansetron  •  Pharmacy to Dose enoxaparin (LOVENOX)  •  [COMPLETED] Insert peripheral IV **AND** sodium chloride  •  sodium chloride  •  sodium chloride    Objective     VITAL SIGNS  Vitals:    06/20/21 0900 06/20/21 1112 06/20/21 1243 06/20/21 1600   BP: 117/74 117/74 103/72 91/59   BP Location: Left arm  Right arm Left arm   Patient Position: Lying  Lying Lying   Pulse: 110  96 80   Resp: 20  16 20   Temp: 97.4 °F (36.3 °C)  97.3 °F (36.3 °C) 97.8 °F (36.6 °C)   TempSrc: Oral  Oral Axillary   SpO2: 95%  93% 92%   Weight:  85.7 kg (189 lb)     Height:  177.8 cm (70\")         Flowsheet Rows      First Filed Value   Admission Height  180.3 cm (71\") Documented at 06/19/2021 2135   Admission Weight  85.3 kg (188 lb) Documented at 06/19/2021 2135           TELEMETRY: Atrial fibrillation    Physical Exam:  Constitutional:       Appearance: Well-developed.   Eyes:      General: No scleral icterus.     Conjunctiva/sclera: Conjunctivae normal.      Pupils: Pupils are equal, round, and reactive to light.   HENT:      Head: Normocephalic and atraumatic.   Neck:      Vascular: No carotid bruit or JVD.   Pulmonary:      Effort: Pulmonary effort is normal.      Breath sounds: Normal breath sounds. No wheezing. No rales.   Cardiovascular:      Normal rate. Irregularly irregular rhythm.      Murmurs: There is a systolic murmur.   Pulses:     Intact distal pulses.   Abdominal:      General: Bowel sounds are normal.      Palpations: Abdomen is soft.   Musculoskeletal: Normal range of motion.      Cervical back: Normal range of motion and neck supple. Skin:     General: Skin is warm and dry.      Findings: No rash.   Neurological:      Mental Status: Alert.      Comments: No focal deficits          Results Review:   I reviewed the patient's new clinical results.  Lab " Results (last 24 hours)     Procedure Component Value Units Date/Time    Troponin [[id number]] Collected: 06/20/21 1706    Specimen: Blood Updated: 06/20/21 1715    POC Glucose Once [[id number]]  (Abnormal) Collected: 06/20/21 1436    Specimen: Blood Updated: 06/20/21 1436     Glucose 159 mg/dL      Comment: Serial Number: [id number]:  [id number]       POC Glucose Once [[id number]]  (Abnormal) Collected: 06/20/21 1238    Specimen: Blood Updated: 06/20/21 1239     Glucose 142 mg/dL      Comment: Serial Number: [id number]:  [id number]       Troponin [[id number]]  (Abnormal) Collected: 06/20/21 0848    Specimen: Blood Updated: 06/20/21 0952     Troponin T 0.104 ng/mL     Narrative:      Troponin T Reference Range:  <= 0.03 ng/mL-   Negative for AMI  >0.03 ng/mL-     Abnormal for myocardial necrosis.  Clinicians would have to utilize clinical acumen, EKG, Troponin and serial changes to determine if it is an Acute Myocardial Infarction or myocardial injury due to an underlying chronic condition.       Results may be falsely decreased if patient taking Biotin.      POC Glucose Once [[id number]]  (Normal) Collected: 06/20/21 0926    Specimen: Blood Updated: 06/20/21 0928     Glucose 99 mg/dL      Comment: Serial Number: [id number]:  [id number]       POC Glucose Once [[id number]]  (Normal) Collected: 06/20/21 0729    Specimen: Blood Updated: 06/20/21 0730     Glucose 104 mg/dL      Comment: Serial Number: [id number]:  [id number]       POC Glucose Once [[id number]]  (Normal) Collected: 06/20/21 0528    Specimen: Blood Updated: 06/20/21 0529     Glucose 103 mg/dL      Comment: Serial Number: [id number]:  [id number]       POC Glucose Once [[id number]]  (Normal) Collected: 06/20/21 0438    Specimen: Blood Updated: 06/20/21 0438     Glucose 79 mg/dL      Comment: Serial Number: [id number]:  [id number]       POC Glucose Once [[id number]]  (Abnormal) Collected: 06/20/21 0331    Specimen: Blood Updated:  06/20/21 0332     Glucose 56 mg/dL      Comment: Serial Number: [id number]:  [id number]       COVID PRE-OP / PRE-PROCEDURE SCREENING ORDER (NO ISOLATION) - Swab, Nasopharynx [[id number]]  (Normal) Collected: 06/20/21 0240    Specimen: Swab from Nasopharynx Updated: 06/20/21 0305    Narrative:      The following orders were created for panel order COVID PRE-OP / PRE-PROCEDURE SCREENING ORDER (NO ISOLATION) - Swab, Nasopharynx.  Procedure                               Abnormality         Status                     ---------                               -----------         ------                     COVID-19,CEPHEID,COR/SORAIDA...[[id number]]  Normal              Final result                 Please view results for these tests on the individual orders.    COVID-19,CEPHEID,COR/SORAIDA/PAD/M[facility]I IN-HOUSE(OR EMERGENT/ADD-ON),NP SWAB IN TRANSPORT MEDIA 3-4 HR TAT, RT-PCR - Swab, Nasopharynx [[id number]]  (Normal) Collected: 06/20/21 0240    Specimen: Swab from Nasopharynx Updated: 06/20/21 0305     COVID19 Not Detected    Narrative:      Fact sheet for providers: https://www.fda.gov/media/473443/download     Fact sheet for patients: https://www.fda.gov/media/879816/download  Fact sheet for providers: https://www.fda.gov/media/572590/download    Fact sheet for patients: https://www.fda.gov/media/239776/download    Test performed by PCR.    POC Glucose Once [[id number]]  (Abnormal) Collected: 06/20/21 0150    Specimen: Blood Updated: 06/20/21 0152     Glucose 131 mg/dL      Comment: Serial Number: [id number]:  [id number]       POC Glucose Once [[id number]]  (Normal) Collected: 06/20/21 0123    Specimen: Blood Updated: 06/20/21 0124     Glucose 75 mg/dL      Comment: Serial Number: [id number]:  [id number]       Urinalysis With Culture If Indicated - Urine, Catheter [[id number]]  (Abnormal) Collected: 06/20/21 0041    Specimen: Urine, Catheter Updated: 06/20/21 0052     Color, UA Yellow     Appearance, UA Clear     pH, UA  <=5.0     Specific Gravity, UA 1.015     Glucose, UA Negative     Ketones, UA Negative     Bilirubin, UA Negative     Blood, UA Trace     Protein, UA Negative     Leuk Esterase, UA Negative     Nitrite, UA Negative     Urobilinogen, UA 1.0 E.U./dL    Urinalysis, Microscopic Only - Urine, Catheter [[id number]]  (Abnormal) Collected: 06/20/21 0041    Specimen: Urine, Catheter Updated: 06/20/21 0052     RBC, UA 6-12 /HPF      WBC, UA 0-2 /HPF      Bacteria, UA None Seen /HPF      Squamous Epithelial Cells, UA 0-2 /HPF      Hyaline Casts, UA 3-6 /LPF      Methodology Automated Microscopy    POC Glucose Once [[id number]]  (Abnormal) Collected: 06/19/21 2346    Specimen: Blood Updated: 06/19/21 2347     Glucose 45 mg/dL      Comment: Serial Number: [id number]:  [id number]       Troponin [[id number]]  (Abnormal) Collected: 06/19/21 2244    Specimen: Blood Updated: 06/19/21 2313     Troponin T 0.057 ng/mL     Narrative:      Troponin T Reference Range:  <= 0.03 ng/mL-   Negative for AMI  >0.03 ng/mL-     Abnormal for myocardial necrosis.  Clinicians would have to utilize clinical acumen, EKG, Troponin and serial changes to determine if it is an Acute Myocardial Infarction or myocardial injury due to an underlying chronic condition.       Results may be falsely decreased if patient taking Biotin.      Comprehensive Metabolic Panel [[id number]]  (Abnormal) Collected: 06/19/21 2244    Specimen: Blood Updated: 06/19/21 2313     Glucose 58 mg/dL      BUN 33 mg/dL      Creatinine 1.83 mg/dL      Sodium 138 mmol/L      Potassium 3.6 mmol/L      Chloride 108 mmol/L      CO2 19.0 mmol/L      Calcium 8.4 mg/dL      Total Protein 6.9 g/dL      Albumin 3.20 g/dL      ALT (SGPT) 10 U/L      AST (SGOT) 15 U/L      Alkaline Phosphatase 66 U/L      Total Bilirubin 0.7 mg/dL      eGFR Non African Amer 35 mL/min/1.73      Globulin 3.7 gm/dL      A/G Ratio 0.9 g/dL      BUN/Creatinine Ratio 18.0     Anion Gap 11.0 mmol/L     Narrative:       GFR Normal >60  Chronic Kidney Disease <60  Kidney Failure <15      CBC & Differential [[id number]]  (Abnormal) Collected: 06/19/21 2244    Specimen: Blood Updated: 06/19/21 2254    Narrative:      The following orders were created for panel order CBC & Differential.  Procedure                               Abnormality         Status                     ---------                               -----------         ------                     Scan Slide[[id number]]                                                                  CBC Auto Differential[[id number]]        Abnormal            Final result                 Please view results for these tests on the individual orders.    CBC Auto Differential [[id number]]  (Abnormal) Collected: 06/19/21 2244    Specimen: Blood Updated: 06/19/21 2254     WBC 6.80 10*3/mm3      RBC 4.78 10*6/mm3      Hemoglobin 10.3 g/dL      Hematocrit 34.5 %      MCV 72.2 fL      MCH 21.6 pg      MCHC 29.9 g/dL      RDW 24.3 %      RDW-SD 61.7 fl      MPV 7.5 fL      Platelets 237 10*3/mm3      Neutrophil % 84.5 %      Lymphocyte % 8.2 %      Monocyte % 5.0 %      Eosinophil % 0.7 %      Basophil % 1.6 %      Neutrophils, Absolute 5.70 10*3/mm3      Lymphocytes, Absolute 0.60 10*3/mm3      Monocytes, Absolute 0.30 10*3/mm3      Eosinophils, Absolute 0.00 10*3/mm3      Basophils, Absolute 0.10 10*3/mm3      nRBC 0.1 /100 WBC     POC Glucose Once [[id number]]  (Normal) Collected: 06/19/21 2125    Specimen: Blood Updated: 06/19/21 2145     Glucose 97 mg/dL      Comment: Serial Number: [id number]:  [id number]             Imaging Results (Last 24 Hours)     Procedure Component Value Units Date/Time    CT Head Without Contrast [[id number]] Collected: 06/20/21 0243     Updated: 06/20/21 0246    Narrative:      EXAMINATION: CT HEAD WITHOUT CONTRAST    DATE: 6/20/2021 2:05 AM    INDICATION: Lethargy    COMPARISON: CT head 8/24/2020    TECHNIQUE: Noncontrast imaging obtained from the vertex to the skull  base.  CT dose lowering techniques were used, to include: automated exposure control, adjustment for patient size, and or use of iterative reconstruction.?    FINDINGS:    Soft Tissues: Mild right facial soft tissue swelling, nonspecific.    Skull: No underlying skull fracture or radiopaque foreign body.    Sinuses: Paranasal sinuses are clear.    Mastoids: Mastoid air cells are clear.     Globes and Orbits: Globes and orbits are intact.    Brain: No acute hemorrhage.  No midline shift, masses, or mass effect.  No evidence of acute infarct by noncontrast CT.    Ventricles and Cisterns: Ventricular size and configuration is within normal limits. Basal cisterns are patent. No abnormal extra-axial fluid collection.    Senescent Changes: Moderate volume loss and chronic microvascular ischemic changes. Arteriosclerosis. 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        Impression:          1. No acute intracranial abnormality.    2. Moderate volume loss and chronic microvascular ischemic changes. 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    Electronically signed by:  Jovanni Alcazar M.D.    6/20/2021 12:45 AM          EKG      I personally viewed and interpreted the patient's EKG/Telemetry data:    ECHOCARDIOGRAM:  · The left ventricular cavity is severely dilated.  · Left ventricular ejection fraction appears to be 21 - 25%.  · The right ventricular cavity is severely dilated.  · Severely reduced right ventricular systolic function noted.  · The right atrial cavity is moderately dilated.  · Moderate mitral valve regurgitation is present.  · Mild dilation of the aortic root is present.  · No pericardial effusion noted    STRESS MYOVIEW:    CARDIAC CATHETERIZATION:    OTHER:         Assessment/Plan     Active Problems:    Hypoglycemia  Elevated troponin  Shortness of breath  Atrial fibrillation  Hypertension  Chronic renal sufficiency  Anemia      Patient  presented with shortness of breath and fatigue and had hypoglycemia initially and is getting better now  Patient also had elevated troponin but could be type II MI from stress and hypoglycemia and renal insufficiency  Patient however had an echocardiogram which showed severe LV dysfunction with an EF of 20 to 25% and also had severe right ventricular dysfunction and agitation  Patient will be continued on beta-blockers at this time.  He cannot have ACE inhibitor's because of renal insufficiency but will place him on hydralazine if his blood pressure permits.  We will try conservative medical therapy because of his advanced age and not do any further testing at this time including cardia catheterization especially because of his chronic renal insufficiency and history of GI bleeding.  Patient is not on any anticoagulants because of his history of GI bleed and patient's decision.    I discussed the patients findings and my recommendations with patient and nurse    Geovanny Gomez MD  06/20/21  17:15 EDT

## 2021-06-21 NOTE — PROGRESS NOTES
LOS: 1 day   Patient Care Team:  John Burleson MD as PCP - General  John Burleson MD as PCP - Family Medicine    Subjective     Interval History:    Patient Complaints: Still fatigued.  Appetite improving.  No chest pain or SOA.    History taken from: patient    Review of Systems   Constitutional: Positive for activity change, appetite change and fatigue. Negative for chills, diaphoresis and fever.   HENT: Negative for facial swelling.    Eyes: Negative for visual disturbance.   Respiratory: Negative for cough, shortness of breath, wheezing and stridor.    Cardiovascular: Negative for chest pain, palpitations and leg swelling.   Gastrointestinal: Negative for constipation, diarrhea, nausea and vomiting.   Genitourinary: Negative for urgency.   Musculoskeletal: Negative for arthralgias.   Neurological: Positive for weakness.   Psychiatric/Behavioral: Negative for confusion.           Objective     Vital Signs  Temp:  [97.2 °F (36.2 °C)-98.2 °F (36.8 °C)] 97.2 °F (36.2 °C)  Heart Rate:  [80-85] 81  Resp:  [15-21] 15  BP: ()/(59-82) 117/82    Physical Exam:     General Appearance:    Alert, cooperative, in no acute distress,   Head:    Normocephalic, without obvious abnormality, atraumatic   Eyes:            Lids and lashes normal, conjunctivae and sclerae normal, no   icterus, no pallor, corneas clear, PERRLA   Ears:    Ears appear intact with no abnormalities noted   Throat:   No oral lesions, no thrush, oral mucosa moist   Neck:   No adenopathy, supple, trachea midline, no thyromegaly, no   carotid bruit, no JVD   Lungs:     Clear to auscultation,respirations regular, even and                  unlabored    Heart:    RRR, III/VI systolic murmur   Chest Wall:    No abnormalities observed   Abdomen:     Normal bowel sounds, no masses, no organomegaly, soft        Non-tender non-distended, no guarding,   Extremities:   Moves all extremities well, no edema, no cyanosis, no             Redness   Pulses:    Pulses palpable and equal bilaterally   Skin:   No bleeding, bruising or rash   Lymph nodes:   No palpable adenopathy   Neurologic:   Cranial nerves 2 - 12 grossly intact, sensation intact, DTR       present and equal bilaterally        Results Review:    Lab Results (last 24 hours)     Procedure Component Value Units Date/Time    Basic Metabolic Panel [[id number]]  (Abnormal) Collected: 06/21/21 0614    Specimen: Blood Updated: 06/21/21 0753     Glucose 107 mg/dL      BUN 35 mg/dL      Creatinine 2.20 mg/dL      Sodium 135 mmol/L      Potassium 5.1 mmol/L      Comment: Result checked         Chloride 105 mmol/L      CO2 18.0 mmol/L      Calcium 8.4 mg/dL      eGFR Non African Amer 28 mL/min/1.73      BUN/Creatinine Ratio 15.9     Anion Gap 12.0 mmol/L     Narrative:      GFR Normal >60  Chronic Kidney Disease <60  Kidney Failure <15      Troponin [[id number]]  (Abnormal) Collected: 06/21/21 0614    Specimen: Blood Updated: 06/21/21 0752     Troponin T 0.117 ng/mL     Narrative:      Troponin T Reference Range:  <= 0.03 ng/mL-   Negative for AMI  >0.03 ng/mL-     Abnormal for myocardial necrosis.  Clinicians would have to utilize clinical acumen, EKG, Troponin and serial changes to determine if it is an Acute Myocardial Infarction or myocardial injury due to an underlying chronic condition.       Results may be falsely decreased if patient taking Biotin.      CBC & Differential [[id number]]  (Abnormal) Collected: 06/21/21 0614    Specimen: Blood Updated: 06/21/21 0739    Narrative:      The following orders were created for panel order CBC & Differential.  Procedure                               Abnormality         Status                     ---------                               -----------         ------                     Scan Slide[[id number]]                                                                  CBC Auto Differential[[id number]]        Abnormal            Final result                 Please view results  for these tests on the individual orders.    CBC Auto Differential [[id number]]  (Abnormal) Collected: 06/21/21 0614    Specimen: Blood Updated: 06/21/21 0739     WBC 6.40 10*3/mm3      RBC 4.98 10*6/mm3      Hemoglobin 10.5 g/dL      Hematocrit 35.8 %      MCV 71.8 fL      MCH 21.2 pg      MCHC 29.5 g/dL      RDW 23.5 %      RDW-SD 59.9 fl      MPV 8.6 fL      Platelets 277 10*3/mm3      Neutrophil % 71.5 %      Lymphocyte % 16.0 %      Monocyte % 8.9 %      Eosinophil % 2.1 %      Basophil % 1.5 %      Neutrophils, Absolute 4.60 10*3/mm3      Lymphocytes, Absolute 1.00 10*3/mm3      Monocytes, Absolute 0.60 10*3/mm3      Eosinophils, Absolute 0.10 10*3/mm3      Basophils, Absolute 0.10 10*3/mm3      nRBC 0.2 /100 WBC     Protime-INR [[id number]]  (Abnormal) Collected: 06/21/21 0614    Specimen: Blood Updated: 06/21/21 0738     Protime 14.4 Seconds      INR 1.32    POC Glucose Once [[id number]]  (Abnormal) Collected: 06/21/21 0611    Specimen: Blood Updated: 06/21/21 0612     Glucose 115 mg/dL      Comment: Serial Number: [id number]:  [id number]       POC Glucose Once [[id number]]  (Abnormal) Collected: 06/21/21 0352    Specimen: Blood Updated: 06/21/21 0353     Glucose 122 mg/dL      Comment: Serial Number: [id number]:  [id number]       POC Glucose Once [[id number]]  (Abnormal) Collected: 06/21/21 0157    Specimen: Blood Updated: 06/21/21 0158     Glucose 123 mg/dL      Comment: Serial Number: [id number]:  [id number]       Magnesium [[id number]]  (Normal) Collected: 06/21/21 0110    Specimen: Blood Updated: 06/21/21 0158     Magnesium 2.1 mg/dL     POC Glucose Once [[id number]]  (Abnormal) Collected: 06/20/21 2355    Specimen: Blood Updated: 06/21/21 0035     Glucose 138 mg/dL      Comment: Serial Number: [id number]:  [id number]       POC Glucose Once [[id number]]  (Abnormal) Collected: 06/20/21 2158    Specimen: Blood Updated: 06/20/21 2159     Glucose 148 mg/dL      Comment: Serial Number:  [id number]:  [id number]       POC Glucose Once [[id number]]  (Abnormal) Collected: 06/20/21 2008    Specimen: Blood Updated: 06/20/21 2009     Glucose 141 mg/dL      Comment: Serial Number: [id number]:  [id number]       POC Glucose Once [[id number]]  (Abnormal) Collected: 06/20/21 1837    Specimen: Blood Updated: 06/20/21 1838     Glucose 160 mg/dL      Comment: Serial Number: [id number]:  [id number]       Troponin [[id number]]  (Abnormal) Collected: 06/20/21 1706    Specimen: Blood Updated: 06/20/21 1742     Troponin T 0.124 ng/mL     Narrative:      Troponin T Reference Range:  <= 0.03 ng/mL-   Negative for AMI  >0.03 ng/mL-     Abnormal for myocardial necrosis.  Clinicians would have to utilize clinical acumen, EKG, Troponin and serial changes to determine if it is an Acute Myocardial Infarction or myocardial injury due to an underlying chronic condition.       Results may be falsely decreased if patient taking Biotin.             Imaging Results (Last 24 Hours)     ** No results found for the last 24 hours. **               I reviewed the patient's new clinical results.    Medication Review:   Scheduled Meds:aspirin, 81 mg, Oral, Daily  bisoprolol, 5 mg, Oral, Daily  enoxaparin, 1 mg/kg, Subcutaneous, Q24H  escitalopram, 10 mg, Oral, Daily  finasteride, 5 mg, Oral, Daily  furosemide, 40 mg, Oral, Daily  levETIRAcetam XR, 2,000 mg, Oral, Daily  pantoprazole, 40 mg, Oral, Q AM  potassium chloride, 40 mEq, Oral, Daily  sodium chloride, 3 mL, Intravenous, Q12H      Continuous Infusions:Pharmacy to Dose enoxaparin (LOVENOX),       PRN Meds:.•  acetaminophen  •  dextrose  •  glucagon (human recombinant)  •  ondansetron  •  Pharmacy to Dose enoxaparin (LOVENOX)  •  [COMPLETED] Insert peripheral IV **AND** sodium chloride  •  sodium chloride  •  sodium chloride     Assessment/Plan       Hypoglycemia  - resolved; currently on no glucose-lowering meds; monitor blood sugar as intake improves.  NSTEMI - no  chest pain; continue asa, Lovenox, beta-blocker, statin.  Not a candidate for intervention, especially given renal function.  Acute on chronic kidney injury - Creat up today; may need to lower dose of Lasix - monitor  Diabetic nephropathy  Severe ischemic cardiomyopathy - see echo report - titrate diuretics, continue beta-blocker  BPH - proscar  Mood disorder - Lexapro  GERD - pantoprazole  Seizure disorder - Keppra      Plan for disposition:Needs skilled rehab; PT bethany Tapia MD  06/21/21  12:57 EDT

## 2021-06-21 NOTE — THERAPY EVALUATION
Patient Name: Jim Montaño  : 1926    MRN: [id number]                              Today's Date: 2021       Admit Date: 2021    Visit Dx:     ICD-10-CM ICD-9-CM   1. Hypoglycemia  E16.2 251.2   2. Lethargy  R53.83 780.79     Patient Active Problem List   Diagnosis   • Type 2 diabetes mellitus with hyperglycemia, with long-term current use of insulin (CMS/HCC)   • Temporary cerebral vascular dysfunction   • Cerebrovascular accident (CVA) (CMS/HCC)   • Seizure disorder (CMS/HCC)   • Essential hypertension   • Mixed hyperlipidemia   • Gastroesophageal reflux disease   • Cough   • Colon cancer (CMS/HCC)   • Body mass index (BMI) of 29.0-29.9 in adult   • Atrial fibrillation (CMS/HCC)   • Adenocarcinoma of cecum (CMS/HCC)   • Hypokalemia   • Hemorrhoids   • Hemorrhage of rectum and anus   • Fatigue   • Upper respiratory infection   • Congestive heart failure (CMS/HCC)   • Chronic renal insufficiency, stage III (moderate) (CMS/HCC)   • Bradycardia   • Blood in urine   • Benign prostatic hyperplasia   • Hyperglycemia   • Stage 3 chronic kidney disease (CMS/HCC)   • Gastrointestinal hemorrhage   • Hypoglycemia     Past Medical History:   Diagnosis Date   • A-fib (CMS/HCC)    • Cancer (CMS/HCC)     colon   • Hypertension    • Type 2 diabetes mellitus (CMS/HCC)      Past Surgical History:   Procedure Laterality Date   • COLONOSCOPY N/A 3/14/2021    Procedure: COLONOSCOPY with endoscopic sclerotherapy and endoscopic clipping;  Surgeon: Goldy Healy MD;  Location: [facility] ENDOSCOPY;  Service: Gastroenterology;  Laterality: N/A;  post: diverticulosis, post surgical changes   • PROSTATE SURGERY       General Information     Row Name 21 1445          General Information    Patient Profile Reviewed  yes  -AM     Prior Level of Function  independent:;gait;transfer ambulates with RW  -AM     Existing Precautions/Restrictions  fall  -AM     Row Name 21 1445          Living Environment    Lives  With  alone  -AM     Row Name 06/21/21 1445          Home Main Entrance    Number of Stairs, Main Entrance  none  -AM     Row Name 06/21/21 1445          Stairs Within Home, Primary    Number of Stairs, Within Home, Primary  none  -AM     Row Name 06/21/21 1445          Cognition    Orientation Status (Cognition)  person  -AM     Row Name 06/21/21 1448 06/21/21 1445       Safety Issues, Functional Mobility    Safety Issues Affecting Function (Mobility)  --  awareness of need for assistance;insight into deficits/self-awareness;safety precaution awareness  -AM    Impairments Affecting Function (Mobility)  --  balance;endurance/activity tolerance;strength  -AM    Comment, Safety Issues/Impairments (Mobility)  gait belt utilized  -AM  --      User Key  (r) = Recorded By, (t) = Taken By, (c) = Cosigned By    Initials Name Provider Type    AM Tyrel Mahan, PT Physical Therapist        Mobility     Row Name 06/21/21 1446          Bed Mobility    Bed Mobility  bed mobility (all) activities  -AM     All Activities, Pharr (Bed Mobility)  moderate assist (50% patient effort);2 person assist;verbal cues  -AM     Assistive Device (Bed Mobility)  bed rails;draw sheet  -AM     Row Name 06/21/21 1446          Bed-Chair Transfer    Bed-Chair Pharr (Transfers)  moderate assist (50% patient effort);maximum assist (25% patient effort);verbal cues;2 person assist  -AM     Row Name 06/21/21 1446          Gait/Stairs (Locomotion)    Pharr Level (Gait)  moderate assist (50% patient effort);maximum assist (25% patient effort)  -AM     Assistive Device (Gait)  walker, front-wheeled  -AM     Distance in Feet (Gait)  5 steps  -AM     Deviations/Abnormal Patterns (Gait)  base of support, narrow;festinating/shuffling  -AM     Bilateral Gait Deviations  forward flexed posture  -AM       User Key  (r) = Recorded By, (t) = Taken By, (c) = Cosigned By    Initials Name Provider Type    AM Tyrel Mahan, PT Physical Therapist         Obj/Interventions     Row Name 06/21/21 1448          Range of Motion Comprehensive    General Range of Motion  no range of motion deficits identified  -AM     Row Name 06/21/21 1448          Strength Comprehensive (MMT)    Comment, General Manual Muscle Testing (MMT) Assessment  4/5 throughout  -AM     Row Name 06/21/21 1448          Balance    Balance Assessment  sitting static balance;sitting dynamic balance;standing static balance;standing dynamic balance  -AM     Static Sitting Balance  WFL  -AM     Dynamic Sitting Balance  WFL  -AM     Static Standing Balance  moderate impairment  -AM     Dynamic Standing Balance  moderate impairment  -AM     Row Name 06/21/21 1448          Sensory Assessment (Somatosensory)    Sensory Assessment (Somatosensory)  sensation intact  -AM       User Key  (r) = Recorded By, (t) = Taken By, (c) = Cosigned By    Initials Name Provider Type    AM Tyrel Mahan, PT Physical Therapist        Goals/Plan     Row Name 06/21/21 1452          Bed Mobility Goal 1 (PT)    Activity/Assistive Device (Bed Mobility Goal 1, PT)  bed mobility activities, all  -AM     Sullivan Level/Cues Needed (Bed Mobility Goal 1, PT)  1 person assist;contact guard assist  -AM     Time Frame (Bed Mobility Goal 1, PT)  long term goal (LTG)  -AM     Row Name 06/21/21 1452          Transfer Goal 1 (PT)    Activity/Assistive Device (Transfer Goal 1, PT)  transfers, all  -AM     Sullivan Level/Cues Needed (Transfer Goal 1, PT)  contact guard assist;1 person assist  -AM     Time Frame (Transfer Goal 1, PT)  long term goal (LTG)  -AM     Row Name 06/21/21 1452          Gait Training Goal 1 (PT)    Activity/Assistive Device (Gait Training Goal 1, PT)  gait (walking locomotion);assistive device use;walker, rolling  -AM     Sullivan Level (Gait Training Goal 1, PT)  contact guard assist;1 person assist  -AM     Distance (Gait Training Goal 1, PT)  75'  -AM     Time Frame (Gait Training Goal 1, PT)  long  "term goal (LTG)  -AM       User Key  (r) = Recorded By, (t) = Taken By, (c) = Cosigned By    Initials Name Provider Type    AM Tyrel Mahan, PT Physical Therapist        Clinical Impression     Row Name 06/21/21 1448          Pain    Additional Documentation  Pain Scale: Numbers Pre/Post-Treatment (Group)  -AM     Row Name 06/21/21 1448          Pain Scale: Numbers Pre/Post-Treatment    Pretreatment Pain Rating  0/10 - no pain  -AM     Posttreatment Pain Rating  0/10 - no pain  -AM     Row Name 06/21/21 1448          Plan of Care Review    Plan of Care Reviewed With  patient  -AM     Outcome Summary  Pt is a 89 year old male who was admitted secondary to fatigue, SOB, lethargy and a blood sugar of 22.  Per pt report, ambulates with RW and has home health aide 6 days/wk for 5 hours a day for \"cooking and cleaning\".  Pt required assist of 2 for all mobilty tasks this date, recommend inpt rehab.  -AM     Row Name 06/21/21 1448          Therapy Assessment/Plan (PT)    Patient/Family Therapy Goals Statement (PT)  To get stronger to go home  -AM     Rehab Potential (PT)  good, to achieve stated therapy goals  -AM     Criteria for Skilled Interventions Met (PT)  yes  -AM     Predicted Duration of Therapy Intervention (PT)  until d/c  -AM     Row Name 06/21/21 1448          Positioning and Restraints    Pre-Treatment Position  -- sitting up at EOB with nursing aide  -AM     Post Treatment Position  bed  -AM     In Bed  supine;call light within reach;encouraged to call for assist;exit alarm on  -AM       User Key  (r) = Recorded By, (t) = Taken By, (c) = Cosigned By    Initials Name Provider Type    AM Tyrel Mahan, PT Physical Therapist        Outcome Measures     Row Name 06/21/21 6602          How much help from another person do you currently need...    Turning from your back to your side while in flat bed without using bedrails?  3  -AM     Moving from lying on back to sitting on the side of a flat bed without " "bedrails?  2  -AM     Moving to and from a bed to a chair (including a wheelchair)?  2  -AM     Standing up from a chair using your arms (e.g., wheelchair, bedside chair)?  2  -AM     Climbing 3-5 steps with a railing?  1  -AM     To walk in hospital room?  1  -AM     AM-PAC 6 Clicks Score (PT)  11  -AM     Row Name 06/21/21 1452          Functional Assessment    Outcome Measure Options  AM-PAC 6 Clicks Basic Mobility (PT)  -AM       User Key  (r) = Recorded By, (t) = Taken By, (c) = Cosigned By    Initials Name Provider Type    AM Tyrel Mahan, PT Physical Therapist        Physical Therapy Education                 Title: PT OT SLP Therapies (In Progress)     Topic: Physical Therapy (In Progress)     Point: Mobility training (In Progress)     Learning Progress Summary           Patient MIGUEL Mathis NR by AM at 6/21/2021 145                   Point: Home exercise program (Not Started)     Learner Progress:  Not documented in this visit.          Point: Body mechanics (Not Started)     Learner Progress:  Not documented in this visit.          Point: Precautions (Not Started)     Learner Progress:  Not documented in this visit.                      User Key     Initials Effective Dates Name Provider Type Discipline    AM 05/10/21 -  Tyrel Mahan, CHDA Physical Therapist PT              PT Recommendation and Plan  Planned Therapy Interventions (PT): balance training, bed mobility training, gait training, patient/family education, transfer training, strengthening  Plan of Care Reviewed With: patient  Outcome Summary: Pt is a 89 year old male who was admitted secondary to fatigue, SOB, lethargy and a blood sugar of 22.  Per pt report, ambulates with RW and has home health aide 6 days/wk for 5 hours a day for \"cooking and cleaning\".  Pt required assist of 2 for all mobilty tasks this date, recommend inpt rehab.     Time Calculation:   PT Charges     Row Name 06/21/21 1454             Time Calculation    Start Time  1235  " -AM      Stop Time  1255  -AM      Time Calculation (min)  20 min  -AM      PT Received On  06/21/21  -AM      PT - Next Appointment  06/22/21  -AM      PT Goal Re-Cert Due Date  07/05/21  -AM         Time Calculation- PT    Total Timed Code Minutes- PT  10 minute(s)  -AM        User Key  (r) = Recorded By, (t) = Taken By, (c) = Cosigned By    Initials Name Provider Type    AM Tyrel Mahan, PT Physical Therapist        Therapy Charges for Today     Code Description Service Date Service Provider Modifiers Qty    15164515531  PT EVAL MOD COMPLEXITY 3 6/21/2021 Tyrel Mahan, PT GP 1    24725747368 HC PT THERAPEUTIC ACT EA 15 MIN 6/21/2021 Tyrel Mahan, PT GP 1          PT G-Codes  Outcome Measure Options: AM-PAC 6 Clicks Basic Mobility (PT)  AM-PAC 6 Clicks Score (PT): 11    Tyrel Mahan, PT  6/21/2021

## 2021-06-21 NOTE — CASE MANAGEMENT/SOCIAL WORK
Discharge Planning Assessment   T[place]wn     Patient Name: Jim Montaño  MRN: [id number]  Today's Date: 6/21/2021    Admit Date: 6/19/2021    Discharge Needs Assessment     Row Name 06/21/21 1701       Living Environment    Lives With  alone    Current Living Arrangements  independent/assisted living facility    Duration at Residence  [facility] independent Living    Primary Care Provided by  other (see comments)    Provides Primary Care For  no one    Family Caregiver if Needed  child(thaddeus), adult;other (see comments)    Family Caregiver Names  daughter Poonam Franklin and leticia Landry, plus pvt caregivers.    Able to Return to Prior Arrangements  yes       Resource/Environmental Concerns    Resource/Environmental Concerns  none       Transition Planning    Patient/Family Anticipates Transition to  home with help/services    Patient/Family Anticipated Services at Transition      Transportation Anticipated  family or friend will provide       Discharge Needs Assessment    Readmission Within the Last 30 Days  no previous admission in last 30 days    Equipment Currently Used at Home  walker, rolling;oxygen Home O2 unknown agency per daughter, new at [facility].    Concerns to be Addressed  discharge planning    Anticipated Changes Related to Illness  none    Discharge Coordination/Progress  DC Plan: From Independent Living at [facility] with pvt caregivers 5 days per wk noon to 9 pm, has home O2. PT consult.        Discharge Plan     Row Name 06/21/21 1705       Plan    Plan  DC Plan: From Independent Living at [facility] with pvt caregivers 5 days per wk noon to 9 pm, has home O2. PT consult.    Plan Comments  Son checks on pt on the weekends.        Continued Care and Services - Admitted Since 6/19/2021    Coordination has not been started for this encounter.         Demographic Summary     Row Name 06/21/21 5799       General Information    Admission Type  inpatient    Arrived From  emergency department     Required Notices Provided  Important Message from Medicare    Referral Source  admission list    Reason for Consult  discharge planning    Preferred Language  English     Used During This Interaction  no    General Information Comments  Spoke to pt per phone then later daughter Poonam Yi.        Functional Status     Row Name 06/21/21 1700       Functional Status    Usual Activity Tolerance  fair    Current Activity Tolerance  poor       Functional Status, IADL    Medications  assistive equipment and person    Meal Preparation  completely dependent    Housekeeping  completely dependent    Laundry  completely dependent    Shopping  completely dependent    IADL Comments  Lives in [facility], has pvt caregivers.       Mental Status    General Appearance WDL  WDL       Mental Status Summary    Recent Changes in Mental Status/Cognitive Functioning  unable to assess        Met with patient in room wearing PPE: mask, goggles.      Maintained distance greater than six feet and spent less than 15 minutes in the room.  Spoke to daughter on phone, no contact.             Bonnie Reynolds RN

## 2021-06-21 NOTE — PLAN OF CARE
Goal Outcome Evaluation:  Plan of Care Reviewed With: patient        Progress: improving     Patient rested well during the night and feeling better.  Blood sugars have been stable 100-150.  Patient was assisted to the bedside commode a couple times and is very weak and unstable.  He has been alert and oriented.

## 2021-06-21 NOTE — PLAN OF CARE
"Goal Outcome Evaluation:  Plan of Care Reviewed With: patient           Outcome Summary: Pt is a 89 year old male who was admitted secondary to fatigue, SOB, lethargy and a blood sugar of 22.  Per pt report, ambulates with RW and has home health aide 6 days/wk for 5 hours a day for \"cooking and cleaning\".  Pt required assist of 2 for all mobilty tasks this date, recommend inpt rehab.  "

## 2021-06-21 NOTE — PROGRESS NOTES
"Referring Provider: Dr. Tapia    Reason for follow-up: Shortness of breath     Patient Care Team:  John Burleson MD as PCP - General  John Burleson MD as PCP - Family Medicine    Subjective .  He still has shortness of breath    Objective  Lying in bed comfortably     Review of Systems   Constitutional: Negative for fever and malaise/fatigue.   Cardiovascular: Negative for chest pain, dyspnea on exertion and palpitations.   Respiratory: Negative for cough and shortness of breath.    Skin: Negative for rash.   Gastrointestinal: Negative for abdominal pain, nausea and vomiting.   Neurological: Negative for focal weakness and headaches.   All other systems reviewed and are negative.      Patient has no known allergies.    Scheduled Meds:aspirin, 81 mg, Oral, Daily  atorvastatin, 10 mg, Oral, Nightly  bisoprolol, 5 mg, Oral, Daily  enoxaparin, 1 mg/kg, Subcutaneous, Q24H  escitalopram, 10 mg, Oral, Daily  finasteride, 5 mg, Oral, Daily  furosemide, 40 mg, Oral, Daily  levETIRAcetam XR, 2,000 mg, Oral, Daily  pantoprazole, 40 mg, Oral, Q AM  potassium chloride, 40 mEq, Oral, Daily  sodium chloride, 3 mL, Intravenous, Q12H      Continuous Infusions:Pharmacy to Dose enoxaparin (LOVENOX),       PRN Meds:.•  acetaminophen  •  dextrose  •  glucagon (human recombinant)  •  ondansetron  •  Pharmacy to Dose enoxaparin (LOVENOX)  •  [COMPLETED] Insert peripheral IV **AND** sodium chloride  •  sodium chloride  •  sodium chloride        VITAL SIGNS  Vitals:    06/20/21 2010 06/21/21 0000 06/21/21 0400 06/21/21 1130   BP: 108/74 100/68 101/68 117/82   BP Location: Left arm Left arm Left arm Left arm   Patient Position: Lying Lying Lying Lying   Pulse: 82 85 81 81   Resp: 21 20 19 15   Temp: 97.4 °F (36.3 °C) 98.1 °F (36.7 °C) 98.2 °F (36.8 °C) 97.2 °F (36.2 °C)   TempSrc: Oral Oral Oral Oral   SpO2: 92% 95% 95% 97%   Weight:       Height:           Flowsheet Rows      First Filed Value   Admission Height  180.3 cm (71\") " Documented at 06/19/2021 2135   Admission Weight  85.3 kg (188 lb) Documented at 06/19/2021 2135           TELEMETRY: Atrial fibrillation    Physical Exam:  Constitutional:       Appearance: Well-developed.   Eyes:      General: No scleral icterus.     Conjunctiva/sclera: Conjunctivae normal.   HENT:      Head: Normocephalic and atraumatic.   Neck:      Vascular: No carotid bruit or JVD.   Pulmonary:      Effort: Pulmonary effort is normal.      Breath sounds: Normal breath sounds. No wheezing. No rales.   Cardiovascular:      Normal rate. Regular rhythm.      Murmurs: There is a systolic murmur.   Pulses:     Intact distal pulses.   Abdominal:      General: Bowel sounds are normal.      Palpations: Abdomen is soft.   Musculoskeletal:      Cervical back: Normal range of motion and neck supple. Skin:     General: Skin is warm and dry.      Findings: No rash.   Neurological:      Mental Status: Alert.          Results Review:   I reviewed the patient's new clinical results.  Lab Results (last 24 hours)     Procedure Component Value Units Date/Time    Basic Metabolic Panel [[id number]]  (Abnormal) Collected: 06/21/21 0614    Specimen: Blood Updated: 06/21/21 0753     Glucose 107 mg/dL      BUN 35 mg/dL      Creatinine 2.20 mg/dL      Sodium 135 mmol/L      Potassium 5.1 mmol/L      Comment: Result checked         Chloride 105 mmol/L      CO2 18.0 mmol/L      Calcium 8.4 mg/dL      eGFR Non African Amer 28 mL/min/1.73      BUN/Creatinine Ratio 15.9     Anion Gap 12.0 mmol/L     Narrative:      GFR Normal >60  Chronic Kidney Disease <60  Kidney Failure <15      Troponin [[id number]]  (Abnormal) Collected: 06/21/21 0614    Specimen: Blood Updated: 06/21/21 0752     Troponin T 0.117 ng/mL     Narrative:      Troponin T Reference Range:  <= 0.03 ng/mL-   Negative for AMI  >0.03 ng/mL-     Abnormal for myocardial necrosis.  Clinicians would have to utilize clinical acumen, EKG, Troponin and serial changes to determine if it is  an Acute Myocardial Infarction or myocardial injury due to an underlying chronic condition.       Results may be falsely decreased if patient taking Biotin.      CBC & Differential [[id number]]  (Abnormal) Collected: 06/21/21 0614    Specimen: Blood Updated: 06/21/21 0739    Narrative:      The following orders were created for panel order CBC & Differential.  Procedure                               Abnormality         Status                     ---------                               -----------         ------                     Scan Slide[[id number]]                                                                  CBC Auto Differential[[id number]]        Abnormal            Final result                 Please view results for these tests on the individual orders.    CBC Auto Differential [[id number]]  (Abnormal) Collected: 06/21/21 0614    Specimen: Blood Updated: 06/21/21 0739     WBC 6.40 10*3/mm3      RBC 4.98 10*6/mm3      Hemoglobin 10.5 g/dL      Hematocrit 35.8 %      MCV 71.8 fL      MCH 21.2 pg      MCHC 29.5 g/dL      RDW 23.5 %      RDW-SD 59.9 fl      MPV 8.6 fL      Platelets 277 10*3/mm3      Neutrophil % 71.5 %      Lymphocyte % 16.0 %      Monocyte % 8.9 %      Eosinophil % 2.1 %      Basophil % 1.5 %      Neutrophils, Absolute 4.60 10*3/mm3      Lymphocytes, Absolute 1.00 10*3/mm3      Monocytes, Absolute 0.60 10*3/mm3      Eosinophils, Absolute 0.10 10*3/mm3      Basophils, Absolute 0.10 10*3/mm3      nRBC 0.2 /100 WBC     Protime-INR [[id number]]  (Abnormal) Collected: 06/21/21 0614    Specimen: Blood Updated: 06/21/21 0738     Protime 14.4 Seconds      INR 1.32    POC Glucose Once [[id number]]  (Abnormal) Collected: 06/21/21 0611    Specimen: Blood Updated: 06/21/21 0612     Glucose 115 mg/dL      Comment: Serial Number: [id number]:  [id number]       POC Glucose Once [[id number]]  (Abnormal) Collected: 06/21/21 0352    Specimen: Blood Updated: 06/21/21 0353     Glucose 122 mg/dL      Comment:  Serial Number: [id number]:  [id number]       POC Glucose Once [[id number]]  (Abnormal) Collected: 06/21/21 0157    Specimen: Blood Updated: 06/21/21 0158     Glucose 123 mg/dL      Comment: Serial Number: [id number]:  [id number]       Magnesium [[id number]]  (Normal) Collected: 06/21/21 0110    Specimen: Blood Updated: 06/21/21 0158     Magnesium 2.1 mg/dL     POC Glucose Once [[id number]]  (Abnormal) Collected: 06/20/21 2355    Specimen: Blood Updated: 06/21/21 0035     Glucose 138 mg/dL      Comment: Serial Number: [id number]:  [id number]       POC Glucose Once [[id number]]  (Abnormal) Collected: 06/20/21 2158    Specimen: Blood Updated: 06/20/21 2159     Glucose 148 mg/dL      Comment: Serial Number: [id number]:  [id number]             Imaging Results (Last 24 Hours)     ** No results found for the last 24 hours. **          EKG      I personally viewed and interpreted the patient's EKG/Telemetry data:    ECHOCARDIOGRAM:    STRESS MYOVIEW:    CARDIAC CATHETERIZATION:    OTHER:         Assessment/Plan     Active Problems:    Hypoglycemia  Congestive heart failure  Elevated troponin  Atrial fibrillation  Hypertension  Chronic insufficiency  Anemia    Noted with shortness of breath and had echocardiogram which showed biventricular failure and his LV ejection fraction about 20 to 25%  Patient also had elevated troponin which could be secondary to atrial fibrillation or demand ischemia with renal insufficiency  Patient also has chronic renal insufficiency and is followed by nephrologist  Patient is currently on beta-blockers and diuretics only  Patient is not on any ACE inhibitor because of renal insufficiency but if his blood pressure permits we will place him on hydralazine.    I discussed the patients findings and my recommendations with patient and nurse    Geovanny Gomez MD  06/21/21  16:27 EDT

## 2021-06-21 NOTE — PLAN OF CARE
Goal Outcome Evaluation:               Patient is resting at this time, no complaints of pain, will continue to assess.

## 2021-06-22 NOTE — PLAN OF CARE
Goal Outcome Evaluation:               Patient resting abed, no distress noted. Patient's blood sugar monitored and has remained above 100 at this time. Patient has stated he is feeling better. Will continue to monitor

## 2021-06-22 NOTE — CASE MANAGEMENT/SOCIAL WORK
Continued Stay Note  NELLI Villagran     Patient Name: Jim Montaño  MRN: [id number]  Today's Date: 6/22/2021    Admit Date: 6/19/2021    Discharge Plan     Row Name 06/22/21 1415       Plan    Plan  DC Plan: Referral to [facility] for inpt rehab, pending acceptance, PASRR approved, no precert required.    Plan Comments  PT recommends inpt rehab. Current with [facility], from [facility].          Phone communication or documentation only - no physical contact with patient or family.           Bonnie Reynolds RN

## 2021-06-22 NOTE — DISCHARGE PLACEMENT REQUEST
"Jim Gomez (89 y.o. Male)     Date of Birth Social Security Number Address Home Phone MRN    12/18/1926  [address] [address] [phone] [id number]    Yazidism Marital Status          Yazdanism        Admission Date Admission Type Admitting Provider Attending Provider Department, Room/Bed    6/19/21 Emergency John Burleson MD Heimer, Brian T, MD [facility] 2B MEDICAL INPATIENT, 225/1    Discharge Date Discharge Disposition Discharge Destination                       Attending Provider: John Burleson MD    Allergies: No Known Allergies    Isolation: None   Infection: None   Code Status: CPR    Ht: 180.3 cm (70.98\")   Wt: 91.7 kg (202 lb 1.6 oz)    Admission Cmt: None   Principal Problem: None                Active Insurance as of 6/19/2021     Primary Coverage     Payor Plan Insurance Group Employer/Plan Group    MEDICARE MEDICARE A & B      Payor Plan Address Payor Plan Phone Number Payor Plan Fax Number Effective Dates    [address] [phone]  12/1/1991 - None Entered    [place] [id number]       Subscriber Name Subscriber Birth Date Member ID       JIM GOMEZ 12/18/1926 [id number]                 Emergency Contacts      (Rel.) Home Phone Work Phone Mobile Phone    SANDIP GOMEZ (Son) -- -- [phone]    JAYME CRAWFORD (Daughter) -- -- [phone]            Insurance Information                MEDICARE/MEDICARE A & B Phone: [phone]    Subscriber: Jim Gomez Subscriber#: [id number]    Group#:  Precert#:           "

## 2021-06-22 NOTE — PLAN OF CARE
Assessment: Jim Montaño presents with functional mobility impairments which indicate the need for skilled intervention. He is functioning far below stated baseline of ambulating with wx and living at longterm. At this juncture, he is requiring significant levels of assistance for basic functional tasks and is at significant risk for falls. Will continue to follow and progress as tolerated.      Plan/Recommendations:   Pt would benefit from Inpatient Rehabilitation placement at discharge from facility and requires no DME at discharge.   Pt desires Inpatient Rehabilitation placement at discharge. Pt cooperative; agreeable to therapeutic recommendations and plan of care.

## 2021-06-22 NOTE — PROGRESS NOTES
"Referring Provider: Dr. Tapia    Reason for follow-up: Shortness of breath     Patient Care Team:  John Burleson MD as PCP - General  John Burleson MD as PCP - Family Medicine    Subjective .  He still has shortness of breath    Objective  Lying in bed comfortably     Review of Systems   Constitutional: Negative for fever and malaise/fatigue.   Cardiovascular: Negative for chest pain, dyspnea on exertion and palpitations.   Respiratory: Negative for cough and shortness of breath.    Skin: Negative for rash.   Gastrointestinal: Negative for abdominal pain, nausea and vomiting.   Neurological: Negative for focal weakness and headaches.   All other systems reviewed and are negative.      Patient has no known allergies.    Scheduled Meds:aspirin, 81 mg, Oral, Daily  atorvastatin, 10 mg, Oral, Nightly  bisoprolol, 5 mg, Oral, Daily  enoxaparin, 1 mg/kg, Subcutaneous, Q24H  escitalopram, 10 mg, Oral, Daily  finasteride, 5 mg, Oral, Daily  furosemide, 40 mg, Oral, Daily  levETIRAcetam XR, 2,000 mg, Oral, Daily  pantoprazole, 40 mg, Oral, Q AM  potassium chloride, 40 mEq, Oral, Daily  sodium chloride, 3 mL, Intravenous, Q12H      Continuous Infusions:Pharmacy to Dose enoxaparin (LOVENOX),       PRN Meds:.•  acetaminophen  •  dextrose  •  glucagon (human recombinant)  •  ondansetron  •  Pharmacy to Dose enoxaparin (LOVENOX)  •  [COMPLETED] Insert peripheral IV **AND** sodium chloride  •  sodium chloride  •  sodium chloride        VITAL SIGNS  Vitals:    06/22/21 0554 06/22/21 0816 06/22/21 0954 06/22/21 1142   BP:   117/79 118/76   BP Location:    Left arm   Patient Position:    Lying   Pulse:   94 87   Resp:    16   Temp:    98.4 °F (36.9 °C)   TempSrc:    Oral   SpO2:    97%   Weight: 93.7 kg (206 lb 8 oz) 91.7 kg (202 lb 1.6 oz)     Height: 180.3 cm (70.98\")          Flowsheet Rows      First Filed Value   Admission Height  180.3 cm (71\") Documented at 06/19/2021 2135   Admission Weight  85.3 kg (188 lb) Documented " at 06/19/2021 2135           TELEMETRY: Atrial fibrillation    Physical Exam:  Constitutional:       Appearance: Well-developed.   Eyes:      General: No scleral icterus.     Conjunctiva/sclera: Conjunctivae normal.   HENT:      Head: Normocephalic and atraumatic.   Neck:      Vascular: No carotid bruit or JVD.   Pulmonary:      Effort: Pulmonary effort is normal.      Breath sounds: Normal breath sounds. No wheezing. No rales.   Cardiovascular:      Normal rate. Regular rhythm.      Murmurs: There is a systolic murmur.   Pulses:     Intact distal pulses.   Abdominal:      General: Bowel sounds are normal.      Palpations: Abdomen is soft.   Musculoskeletal:      Cervical back: Normal range of motion and neck supple. Skin:     General: Skin is warm and dry.      Findings: No rash.   Neurological:      Mental Status: Alert.          Results Review:   I reviewed the patient's new clinical results.  Lab Results (last 24 hours)     Procedure Component Value Units Date/Time    POC Glucose Once [[id number]]  (Abnormal) Collected: 06/22/21 1314    Specimen: Blood Updated: 06/22/21 1315     Glucose 185 mg/dL      Comment: Serial Number: [id number]:  [id number]       POC Glucose Once [[id number]]  (Abnormal) Collected: 06/22/21 1151    Specimen: Blood Updated: 06/22/21 1152     Glucose 183 mg/dL      Comment: Serial Number: [id number]:  [id number]       POC Glucose Once [[id number]]  (Abnormal) Collected: 06/21/21 2213    Specimen: Blood Updated: 06/22/21 1145     Glucose 172 mg/dL      Comment: Serial Number: [id number]:  [id number]       POC Glucose Once [[id number]]  (Abnormal) Collected: 06/21/21 1120    Specimen: Blood Updated: 06/22/21 1144     Glucose 169 mg/dL      Comment: Serial Number: [id number]:  [id number]       POC Glucose Once [[id number]]  (Abnormal) Collected: 06/21/21 0710    Specimen: Blood Updated: 06/22/21 1143     Glucose 151 mg/dL      Comment: Serial Number: [id number]:   [id number]       POC Glucose Once [[id number]]  (Abnormal) Collected: 06/22/21 0857    Specimen: Blood Updated: 06/22/21 0858     Glucose 131 mg/dL      Comment: Serial Number: [id number]:  [id number]       POC Glucose Once [[id number]]  (Abnormal) Collected: 06/22/21 0717    Specimen: Blood Updated: 06/22/21 0718     Glucose 128 mg/dL      Comment: Serial Number: [id number]:  [id number]       POC Glucose Once [[id number]]  (Abnormal) Collected: 06/22/21 0624    Specimen: Blood Updated: 06/22/21 0626     Glucose 122 mg/dL      Comment: Serial Number: [id number]:  [id number]       POC Glucose Once [[id number]]  (Abnormal) Collected: 06/22/21 0507    Specimen: Blood Updated: 06/22/21 0507     Glucose 134 mg/dL      Comment: Serial Number: [id number]:  [id number]       CBC & Differential [[id number]]  (Abnormal) Collected: 06/22/21 0344    Specimen: Blood Updated: 06/22/21 0433    Narrative:      The following orders were created for panel order CBC & Differential.  Procedure                               Abnormality         Status                     ---------                               -----------         ------                     Scan Slide[[id number]]                                                                  CBC Auto Differential[[id number]]        Abnormal            Final result                 Please view results for these tests on the individual orders.    CBC Auto Differential [[id number]]  (Abnormal) Collected: 06/22/21 0344    Specimen: Blood Updated: 06/22/21 0433     WBC 6.20 10*3/mm3      RBC 4.90 10*6/mm3      Hemoglobin 10.6 g/dL      Hematocrit 35.3 %      MCV 71.9 fL      MCH 21.5 pg      MCHC 29.9 g/dL      RDW 23.9 %      RDW-SD 59.9 fl      MPV 8.4 fL      Platelets 262 10*3/mm3      Neutrophil % 72.1 %      Lymphocyte % 14.9 %      Monocyte % 9.5 %      Eosinophil % 2.0 %      Basophil % 1.5 %      Neutrophils, Absolute 4.40 10*3/mm3      Lymphocytes, Absolute 0.90 10*3/mm3       Monocytes, Absolute 0.60 10*3/mm3      Eosinophils, Absolute 0.10 10*3/mm3      Basophils, Absolute 0.10 10*3/mm3      nRBC 0.3 /100 WBC     Basic Metabolic Panel [[id number]]  (Abnormal) Collected: 06/22/21 0344    Specimen: Blood Updated: 06/22/21 0432     Glucose 140 mg/dL      BUN 40 mg/dL      Creatinine 2.46 mg/dL      Sodium 135 mmol/L      Potassium 5.4 mmol/L      Chloride 105 mmol/L      CO2 18.0 mmol/L      Calcium 8.5 mg/dL      eGFR Non African Amer 25 mL/min/1.73      BUN/Creatinine Ratio 16.3     Anion Gap 12.0 mmol/L     Narrative:      GFR Normal >60  Chronic Kidney Disease <60  Kidney Failure <15      POC Glucose Once [[id number]]  (Abnormal) Collected: 06/22/21 0229    Specimen: Blood Updated: 06/22/21 0230     Glucose 134 mg/dL      Comment: Serial Number: [id number]:  [id number]       POC Glucose Once [[id number]]  (Abnormal) Collected: 06/22/21 0050    Specimen: Blood Updated: 06/22/21 0053     Glucose 143 mg/dL      Comment: Serial Number: [id number]:  [id number]       POC Glucose Once [[id number]]  (Abnormal) Collected: 06/21/21 2021    Specimen: Blood Updated: 06/21/21 2024     Glucose 156 mg/dL      Comment: Serial Number: [id number]:  [id number]             Imaging Results (Last 24 Hours)     ** No results found for the last 24 hours. **          EKG      I personally viewed and interpreted the patient's EKG/Telemetry data:    ECHOCARDIOGRAM:    STRESS MYOVIEW:    CARDIAC CATHETERIZATION:    OTHER:         Assessment/Plan     Active Problems:    Hypoglycemia  Congestive heart failure  Elevated troponin  Atrial fibrillation  Hypertension  Chronic insufficiency  Anemia    Noted with shortness of breath and had echocardiogram which showed biventricular failure and his LV ejection fraction about 20 to 25%  Patient also had elevated troponin which could be secondary to atrial fibrillation or demand ischemia with renal insufficiency  Patient also has chronic renal insufficiency  and is followed by nephrologist  Patient is currently on beta-blockers and diuretics only  Patient is not on any ACE inhibitor because of renal insufficiency but if his blood pressure permits we will place him on hydralazine.    I discussed the patients findings and my recommendations with patient and nurse    Geovanny Gomez MD  06/22/21  13:24 EDT

## 2021-06-22 NOTE — PROGRESS NOTES
LOS: 2 days   Patient Care Team:  John Burleson MD as PCP - General  John Burleson MD as PCP - Family Medicine    Subjective:  Not at baseline//confused    Objective:   Afebrile    Review of Systems:   Review of Systems   Constitutional: Positive for activity change.   Respiratory: Negative.    Cardiovascular: Negative.    Neurological: Positive for weakness.           Vital Signs  Temp:  [97.2 °F (36.2 °C)-98 °F (36.7 °C)] 98 °F (36.7 °C)  Heart Rate:  [81-88] 84  Resp:  [15-16] 16  BP: (114-117)/(82-87) 116/87    Physical Exam:  Physical Exam  Vitals and nursing note reviewed.   Constitutional:       Appearance: Normal appearance.   Cardiovascular:      Rate and Rhythm: Normal rate. Rhythm irregular.      Heart sounds: Normal heart sounds.   Pulmonary:      Breath sounds: Normal breath sounds.   Skin:     General: Skin is warm.   Neurological:      Mental Status: He is alert.          Radiology:  CT Head Without Contrast    Result Date: 6/20/2021  1. No acute intracranial abnormality. 2. Moderate volume loss and chronic microvascular ischemic changes. 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 Electronically signed by:  Jovanni Alcazar M.D.  6/20/2021 12:45 AM       Results Review:     I reviewed the patient's new clinical results.  I reviewed the patient's new imaging results and agree with the interpretation.    Medication Review:   Scheduled Meds:aspirin, 81 mg, Oral, Daily  atorvastatin, 10 mg, Oral, Nightly  bisoprolol, 5 mg, Oral, Daily  enoxaparin, 1 mg/kg, Subcutaneous, Q24H  escitalopram, 10 mg, Oral, Daily  finasteride, 5 mg, Oral, Daily  furosemide, 40 mg, Oral, Daily  levETIRAcetam XR, 2,000 mg, Oral, Daily  pantoprazole, 40 mg, Oral, Q AM  potassium chloride, 40 mEq, Oral, Daily  sodium chloride, 3 mL, Intravenous, Q12H      Continuous Infusions:Pharmacy to Dose enoxaparin (LOVENOX),       PRN Meds:.•  acetaminophen  •  dextrose  •  glucagon (human recombinant)  •   ondansetron  •  Pharmacy to Dose enoxaparin (LOVENOX)  •  [COMPLETED] Insert peripheral IV **AND** sodium chloride  •  sodium chloride  •  sodium chloride    Labs:    CBC    Results from last 7 days   Lab Units 06/22/21  0344 06/21/21  0614 06/19/21  2244   WBC 10*3/mm3 6.20 6.40 6.80   HEMOGLOBIN g/dL 10.6* 10.5* 10.3*   PLATELETS 10*3/mm3 262 277 237     BMP   Results from last 7 days   Lab Units 06/22/21  0344 06/21/21  0614 06/21/21  0110 06/19/21  2244   SODIUM mmol/L 135* 135*  --  138   POTASSIUM mmol/L 5.4* 5.1  --  3.6   CHLORIDE mmol/L 105 105  --  108*   CO2 mmol/L 18.0* 18.0*  --  19.0*   BUN mg/dL 40* 35*  --  33*   CREATININE mg/dL 2.46* 2.20*  --  1.83*   GLUCOSE mg/dL 140* 107*  --  58*   MAGNESIUM mg/dL  --   --  2.1  --      Cr Clearance Estimated Creatinine Clearance: 21.5 mL/min (A) (by C-G formula based on SCr of 2.46 mg/dL (H)).  Coag   Results from last 7 days   Lab Units 06/21/21 0614   INR  1.32*     HbA1C   Lab Results   Component Value Date    HGBA1C 7.1 (H) 05/04/2021    HGBA1C 8.77 (H) 03/13/2021    HGBA1C 11.9 (H) 08/25/2020     Blood Glucose   Glucose   Date/Time Value Ref Range Status   06/22/2021 0717 128 (H) 70 - 105 mg/dL Final     Comment:     Serial Number: [id number]:  [id number]   06/22/2021 0624 122 (H) 70 - 105 mg/dL Final     Comment:     Serial Number: [id number]:  [id number]   06/22/2021 0507 134 (H) 70 - 105 mg/dL Final     Comment:     Serial Number: [id number]:  [id number]   06/22/2021 0229 134 (H) 70 - 105 mg/dL Final     Comment:     Serial Number: [id number]:  [id number]   06/22/2021 0050 143 (H) 70 - 105 mg/dL Final     Comment:     Serial Number: [id number]:  [id number]   06/21/2021 2021 156 (H) 70 - 105 mg/dL Final     Comment:     Serial Number: [id number]:  [id number]   06/21/2021 1638 171 (H) 70 - 105 mg/dL Final     Comment:     Serial Number: [id number]:  [id number]   06/21/2021 0611 115 (H) 70 - 105 mg/dL Final      Comment:     Serial Number: [id number]:  [id number]     Infection     CMP   Results from last 7 days   Lab Units 06/22/21  0344 06/21/21  0614 06/19/21  2244   SODIUM mmol/L 135* 135* 138   POTASSIUM mmol/L 5.4* 5.1 3.6   CHLORIDE mmol/L 105 105 108*   CO2 mmol/L 18.0* 18.0* 19.0*   BUN mg/dL 40* 35* 33*   CREATININE mg/dL 2.46* 2.20* 1.83*   GLUCOSE mg/dL 140* 107* 58*   ALBUMIN g/dL  --   --  3.20*   BILIRUBIN mg/dL  --   --  0.7   ALK PHOS U/L  --   --  66   AST (SGOT) U/L  --   --  15   ALT (SGPT) U/L  --   --  10     UA    Results from last 7 days   Lab Units 06/20/21  0041   NITRITE UA  Negative   WBC UA /HPF 0-2*   BACTERIA UA /HPF None Seen   SQUAM EPITHEL UA /HPF 0-2     Radiology(recent) No radiology results for the last day   Assessment:    Acute Hypoglycemia  Acute troponin elevations secondary to on demand ischemia  ACD  NITIN  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  Chronic systolic congestive heart failure / CHFrEF 27%  Cerebrovascular disease status post CVA  Seizure disorder  Diabetic dyslipidemia  Chronic kidney disease stage IIIa secondary to hypertensive nephropathy and DGS  History of colon cancer  History of right hemicolectomy  Atrial fibrillation, chronic  Hypercoagulable state secondary to atrial fibrillation  Gastroesophageal reflux disease without gastritis  Degenerative disc disease lumbosacral spine  Degenerative joint disease  Vitamin B12 deficiency  Chronic insulin use  BPH with LUTS  Chronic pancreatitis  Coronary artery calcifications  LVH  Pulmonary hypertension  Hypertension associated with chronic kidney disease stage IIIa  Atherosclerotic heart disease of native coronary arteries with angina pectoris    Plan:  Supportive care//plans as outlined//discharge planning        John Burleson MD  06/22/21  07:46 EDT

## 2021-06-22 NOTE — THERAPY TREATMENT NOTE
Subjective: Pt agreeable to therapeutic plan of care.    Objective:     Observation - Pt continuously blinks his eyes reporting hx infxn with daily saline solution washes at home- RN notified. Denies changes in vision or pain.   Cognition - Is oriented to self and place, not oriented to month or situation.   Bed mobility - Mod-A and Assist x 2  Transfers - Mod-A, Assist x 2 and with rolling walker  Ambulation - N/A or Not attempted. Unable to weight shift to R in order to offload L for advancing LE's.     Pain: Denies Pain.     Education: Provided education on importance of mobility and skilled verbal / tactile cueing throughout intervention.     Assessment: Jim Montaño presents with functional mobility impairments which indicate the need for skilled intervention. He is functioning far below stated baseline of ambulating with wx and living at [facility]. At this juncture, he is requiring significant levels of assistance for basic functional tasks and is at significant risk for falls. Will continue to follow and progress as tolerated.     Plan/Recommendations:   Pt would benefit from Inpatient Rehabilitation placement at discharge from facility and requires no DME at discharge.   Pt desires Inpatient Rehabilitation placement at discharge. Pt cooperative; agreeable to therapeutic recommendations and plan of care.     Basic Mobility 6-click:  Rollin = Total, A lot = 2, A little = 3; 4 = None  Supine>Sit:   1 = Total, A lot = 2, A little = 3; 4 = None   Sit>Stand with arms:  1 = Total, A lot = 2, A little = 3; 4 = None  Bed>Chair:   1 = Total, A lot = 2, A little = 3; 4 = None  Ambulate in room:  1 = Total, A lot = 2, A little = 3; 4 = None  3-5 Steps with railin = Total, A lot = 2, A little = 3; 4 = None  Score: 9    Modified [place]: 4 = Moderately severe disability (Unable to attend to own bodily needs without assistance, and unable to walk unassisted)     Post-Tx Position: Supine with HOB Elevated,  Alarms activated and Call light and personal items within reach     PPE: gloves, surgical mask, eyewear protection

## 2021-06-23 NOTE — THERAPY TREATMENT NOTE
Subjective: Pt agreeable to therapeutic plan of care.    Objective:     Bed mobility - Mod-A, inially struggling to eob, sitting balance improved c time up at eob.  Transfers - Mod-A and with rolling walker, needs reinfroced safety c rwx. use and transfers.  Ambulation - 3' x 2 feet Min-A and with rolling walker. Weakness present, low activity tolerance.    Pain: 0 VAS  Education: Provided education on importance of mobility and skilled verbal / tactile cueing throughout intervention.     Assessment: Jim Montaño presents with functional mobility impairments which indicate the need for skilled intervention. Tolerating session today without incident. Will continue to follow and progress as tolerated.     Plan/Recommendations:   Pt would benefit from Inpatient Rehabilitation placement at discharge from facility and requires no DME at discharge.   Pt desires Inpatient Rehabilitation placement at discharge. Pt cooperative; agreeable to therapeutic recommendations and plan of care.     Basic Mobility 6-click:  Rollin = Total, A lot = 2, A little = 3; 4 = None  Supine>Sit:   1 = Total, A lot = 2, A little = 3; 4 = None   Sit>Stand with arms:  1 = Total, A lot = 2, A little = 3; 4 = None  Bed>Chair:   1 = Total, A lot = 2, A little = 3; 4 = None  Ambulate in room:  1 = Total, A lot = 2, A little = 3; 4 = None  3-5 Steps with railin = Total, A lot = 2, A little = 3; 4 = None  Score: 15    Modified [place]: 4 = Moderately severe disability (Unable to attend to own bodily needs without assistance, and unable to walk unassisted)     Post-Tx Position: Up in Chair, Staff Present, Alarms activated and Call light and personal items within reach  PPE: gloves, surgical mask, eyewear protection

## 2021-06-23 NOTE — CASE MANAGEMENT/SOCIAL WORK
Continued Stay Note   Tyshawn     Patient Name: Jim Montaño  MRN: [id number]  Today's Date: 6/23/2021    Admit Date: 6/19/2021    Discharge Plan     Row Name 06/23/21 1420       Plan    Plan  DC Plan: Referrals pending to 1. [facility], [facility]. [facility], and 3. [facility], pending acceptance, PASRR approved, no precert required.    Plan Comments  Per CM, [facility] does not have a bed to accommodate patient until at least Friday 6/25 and pt needs new choices. SW attempted to see pt at the bedside - however, pt not able to participate in conversation. SW called pt's son, who deferred to pt's dtr. SW contacted pt's dtr and next choices are 1. [facility], [facility], and 3. [facility]. Referrals sent to all three of the above ECFs via text and Epic - SW informed liaisons that multiple referrals are pending and to inform SW/CM of which can accept.        Met with patient in room wearing PPE: mask, face shield/goggles.      Maintained distance greater than six feet and spent less than 15 minutes in the room.    Reyna Shi, [facility] [place]    Office: [phone]  Cell: [phone]  Fax: [phone]  E-mail: [email or web address]

## 2021-06-23 NOTE — PLAN OF CARE
Pt would benefit from Inpatient Rehabilitation placement at discharge from facility and requires no DME at discharge.   Pt desires Inpatient Rehabilitation placement at discharge. Pt cooperative; agreeable to therapeutic recommendations and plan of care.

## 2021-06-23 NOTE — PROGRESS NOTES
LOS: 3 days   Patient Care Team:  John Burleson MD as PCP - General  John Burleson MD as PCP - Family Medicine    Subjective:  Looks and feels better    Objective:   Not at baseline      Review of Systems:   Review of Systems   Constitutional: Positive for activity change.   Respiratory: Negative.    Cardiovascular: Negative.    Gastrointestinal: Negative.    Neurological: Positive for weakness.   Psychiatric/Behavioral: Positive for confusion.           Vital Signs  Temp:  [97.3 °F (36.3 °C)-97.6 °F (36.4 °C)] 97.3 °F (36.3 °C)  Heart Rate:  [73-82] 75  Resp:  [16-18] 16  BP: (104-114)/(70-75) 112/75    Physical Exam:  Physical Exam  Vitals and nursing note reviewed.   Cardiovascular:      Rate and Rhythm: Normal rate. Rhythm irregular.      Heart sounds: Normal heart sounds.   Pulmonary:      Breath sounds: Normal breath sounds.   Skin:     General: Skin is warm.   Neurological:      Mental Status: He is alert.          Radiology:  CT Head Without Contrast    Result Date: 6/20/2021  1. No acute intracranial abnormality. 2. Moderate volume loss and chronic microvascular ischemic changes. 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 Electronically signed by:  Jovanni Alcazar M.D.  6/20/2021 12:45 AM         Results Review:     I reviewed the patient's new clinical results.  I reviewed the patient's new imaging results and agree with the interpretation.    Medication Review:   Scheduled Meds:aspirin, 81 mg, Oral, Daily  atorvastatin, 10 mg, Oral, Nightly  bisoprolol, 5 mg, Oral, Daily  enoxaparin, 1 mg/kg, Subcutaneous, Q24H  escitalopram, 10 mg, Oral, Daily  finasteride, 5 mg, Oral, Daily  furosemide, 40 mg, Oral, Daily  levETIRAcetam XR, 2,000 mg, Oral, Daily  pantoprazole, 40 mg, Oral, Q AM  potassium chloride, 40 mEq, Oral, Daily  sodium chloride, 3 mL, Intravenous, Q12H      Continuous Infusions:Pharmacy to Dose enoxaparin (LOVENOX),       PRN Meds:.•  acetaminophen  •  dextrose  •   glucagon (human recombinant)  •  ondansetron  •  Pharmacy to Dose enoxaparin (LOVENOX)  •  [COMPLETED] Insert peripheral IV **AND** sodium chloride  •  sodium chloride  •  sodium chloride    Labs:    CBC    Results from last 7 days   Lab Units 06/23/21  0446 06/22/21  0344 06/21/21  0614 06/19/21  2244   WBC 10*3/mm3 6.10 6.20 6.40 6.80   HEMOGLOBIN g/dL 10.9* 10.6* 10.5* 10.3*   PLATELETS 10*3/mm3 216 262 277 237     BMP   Results from last 7 days   Lab Units 06/23/21 0446 06/22/21  2337 06/22/21  0344 06/21/21 0614 06/21/21  0110 06/19/21  2244   SODIUM mmol/L 136 137 135* 135*  --  138   POTASSIUM mmol/L 4.7 4.6 5.4* 5.1  --  3.6   CHLORIDE mmol/L 105 106 105 105  --  108*   CO2 mmol/L 18.0* 20.0* 18.0* 18.0*  --  19.0*   BUN mg/dL 42* 43* 40* 35*  --  33*   CREATININE mg/dL 2.52* 2.78* 2.46* 2.20*  --  1.83*   GLUCOSE mg/dL 142* 157* 140* 107*  --  58*   MAGNESIUM mg/dL  --   --   --   --  2.1  --      Cr Clearance Estimated Creatinine Clearance: 20.8 mL/min (A) (by C-G formula based on SCr of 2.52 mg/dL (H)).  Coag   Results from last 7 days   Lab Units 06/21/21 0614   INR  1.32*     HbA1C   Lab Results   Component Value Date    HGBA1C 7.1 (H) 05/04/2021    HGBA1C 8.77 (H) 03/13/2021    HGBA1C 11.9 (H) 08/25/2020     Blood Glucose   Glucose   Date/Time Value Ref Range Status   06/23/2021 1617 227 (H) 70 - 105 mg/dL Final     Comment:     Serial Number: [id number]:  [id number]   06/23/2021 1136 165 (H) 70 - 105 mg/dL Final     Comment:     Serial Number: [id number]:  [id number]   06/23/2021 0729 135 (H) 70 - 105 mg/dL Final     Comment:     Serial Number: [id number]:  [id number]   06/22/2021 1941 145 (H) 70 - 105 mg/dL Final     Comment:     Serial Number: [id number]:  [id number]   06/22/2021 1654 181 (H) 70 - 105 mg/dL Final     Comment:     Serial Number: [id number]:  [id number]   06/22/2021 1314 185 (H) 70 - 105 mg/dL Final     Comment:     Serial Number: [id number]:   [id number]   06/22/2021 1151 183 (H) 70 - 105 mg/dL Final     Comment:     Serial Number: [id number]:  [id number]   06/22/2021 0857 131 (H) 70 - 105 mg/dL Final     Comment:     Serial Number: [id number]:  [id number]     Infection     CMP   Results from last 7 days   Lab Units 06/23/21  0446 06/22/21  2337 06/22/21  0344 06/21/21  0614 06/19/21  2244   SODIUM mmol/L 136 137 135* 135* 138   POTASSIUM mmol/L 4.7 4.6 5.4* 5.1 3.6   CHLORIDE mmol/L 105 106 105 105 108*   CO2 mmol/L 18.0* 20.0* 18.0* 18.0* 19.0*   BUN mg/dL 42* 43* 40* 35* 33*   CREATININE mg/dL 2.52* 2.78* 2.46* 2.20* 1.83*   GLUCOSE mg/dL 142* 157* 140* 107* 58*   ALBUMIN g/dL  --   --   --   --  3.20*   BILIRUBIN mg/dL  --   --   --   --  0.7   ALK PHOS U/L  --   --   --   --  66   AST (SGOT) U/L  --   --   --   --  15   ALT (SGPT) U/L  --   --   --   --  10     UA    Results from last 7 days   Lab Units 06/20/21  0041   NITRITE UA  Negative   WBC UA /HPF 0-2*   BACTERIA UA /HPF None Seen   SQUAM EPITHEL UA /HPF 0-2     Radiology(recent) No radiology results for the last day   Assessment:  Acute Hypoglycemia  Acute troponin elevations secondary to on demand ischemia  ACD  NITIN  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  Chronic systolic congestive heart failure / CHFrEF 27%  Cerebrovascular disease status post CVA  Seizure disorder  Diabetic dyslipidemia  Chronic kidney disease stage IIIa secondary to hypertensive nephropathy and DGS  History of colon cancer  History of right hemicolectomy  Atrial fibrillation, chronic  Hypercoagulable state secondary to atrial fibrillation  Gastroesophageal reflux disease without gastritis  Degenerative disc disease lumbosacral spine  Degenerative joint disease  Vitamin B12 deficiency  Chronic insulin use  BPH with LUTS  Chronic pancreatitis  Coronary artery  calcifications  LVH  Pulmonary hypertension  Hypertension associated with chronic kidney disease stage IIIa  Atherosclerotic heart disease of native coronary arteries with angina pectoris    Plan:  Observe tonight with anticipated D/C in am        John Burleson MD  06/23/21  18:40 EDT

## 2021-06-23 NOTE — DISCHARGE PLACEMENT REQUEST
"  Jim Gomez (89 y.o. Male)     Date of Birth Social Security Number Address Home Phone MRN    12/18/1926  [address] [facility] [place] [address] [phone] [id number]    [employer] Mar[employer] Status          Confucianist        Admission Date Admission Type Admitting Provider Attending Provider Department, Room/Bed    6/19/21 Emergency John Burleson MD Heimer, Brian T, MD [facility] 2B MEDICAL INPATIENT, 225/1    Discharge Date Discharge Disposition Discharge Destination                       Attending Provider: John Burleson MD    Allergies: No Known Allergies    Isolation: None   Infection: None   Code Status: CPR    Ht: 180.3 cm (70.98\")   Wt: 91.9 kg (202 lb 8 oz)    Admission Cmt: None   Principal Problem: None                Active Insurance as of 6/19/2021     Primary Coverage     Payor Plan Insurance Group Employer/Plan Group    MEDICARE MEDICARE A & B      Payor Plan Address Payor Plan Phone Number Payor Plan Fax Number Effective Dates    [address] [phone]  12/1/1991 - None Entered    [place] [id number]       Subscriber Name Subscriber Birth Date Member ID       JIM GOMEZ 12/18/1926 [id number]                 Emergency Contacts      (Rel.) Home Phone Work Phone Mobile Phone    SANDIP GOMEZ (Son) -- -- [phone]    JAYME CRAWFORD (Daughter) -- -- [phone]               History & Physical      Elaine Tapia MD at 06/20/21 1043              Patient Care Team:  John Burleson MD as PCP - General  John Burleson MD as PCP - Family Medicine    Chief complaint \"I am just tired\"    Subjective     Patient is a 89 y.o. male with history of chronic atrial fibrillation and type 2 diabetes who was noted yesterday to be lethargic by his family.  EMS was called and his blood sugar was found to be 22.  He presumably had taken his Lantus the night before but had not eaten or drank much during the day.  Blood sugar responded to IV D50 but then remained " low overnight, requiring glucose containing IV fluids.  This morning he is awake and alert but quite fatigued.  He denies any chest pain, shortness of breath, dizziness, nausea or other specific problems.  Troponin was elevated last night and has trended upward this morning.     Review of Systems   Constitutional: Positive for activity change, appetite change and fatigue. Negative for chills, diaphoresis and fever.   HENT: Negative for facial swelling.    Eyes: Negative for visual disturbance.   Respiratory: Negative for cough, shortness of breath, wheezing and stridor.    Cardiovascular: Negative for chest pain, palpitations and leg swelling.   Gastrointestinal: Negative for abdominal pain, constipation, diarrhea, nausea and vomiting.   Endocrine: Negative for polyuria.   Genitourinary: Negative for dysuria, enuresis and frequency.   Musculoskeletal: Positive for arthralgias.   Skin: Negative for rash.   Neurological: Positive for weakness. Negative for dizziness, tremors, seizures, syncope, speech difficulty and headaches.   Psychiatric/Behavioral: Negative for confusion.          History  Past Medical History:   Diagnosis Date   • A-fib (CMS/HCC)    • Cancer (CMS/HCC)     colon   • Hypertension    • Type 2 diabetes mellitus (CMS/HCC)      Past Surgical History:   Procedure Laterality Date   • COLONOSCOPY N/A 3/14/2021    Procedure: COLONOSCOPY with endoscopic sclerotherapy and endoscopic clipping;  Surgeon: Goldy Healy MD;  Location: [facility] ENDOSCOPY;  Service: Gastroenterology;  Laterality: N/A;  post: diverticulosis, post surgical changes   • PROSTATE SURGERY       Family History   Problem Relation Age of Onset   • Cancer Father         multipule mylomia      Social History     Tobacco Use   • Smoking status: Never Smoker   • Smokeless tobacco: Never Used   Substance Use Topics   • Alcohol use: Not Currently   • Drug use: Never     Medications Prior to Admission   Medication Sig Dispense Refill Last  Dose   • aspirin (Aspir-Low) 81 MG EC tablet Take 81 mg by mouth Daily.      • bisoprolol (ZEBeta) 5 MG tablet Take 5 mg by mouth Daily.      • Cholecalciferol (vitamin D3) 125 MCG (5000 UT) capsule capsule Take 5,000 Units by mouth Daily.      • escitalopram (LEXAPRO) 10 MG tablet Take 10 mg by mouth Daily.      • famotidine (PEPCID) 40 MG tablet Take 1 tablet by mouth Daily. 30 tablet 3    • finasteride (PROSCAR) 5 MG tablet Take 5 mg by mouth Daily.      • furosemide (LASIX) 20 MG tablet Take 40 mg by mouth Daily.      • insulin aspart (novoLOG) 100 UNIT/ML injection Inject 15 Units under the skin into the appropriate area as directed 3 (Three) Times a Day Before Meals.      • Insulin Glargine (BASAGLAR KWIKPEN SC) Inject 45 Units under the skin into the appropriate area as directed Every Night.      • levETIRAcetam XR (KEPPRA XR) 500 MG 24 hr tablet Take 2,000 mg by mouth Daily.      • potassium chloride (K-DUR,KLOR-CON) 20 MEQ CR tablet Take 40 mEq by mouth Daily.      • pravastatin (PRAVACHOL) 40 MG tablet Take 40 mg by mouth Daily.        Allergies:  Patient has no known allergies.    Objective     Vital Signs  Temp:  [94.7 °F (34.8 °C)-97.4 °F (36.3 °C)] 97.4 °F (36.3 °C)  Heart Rate:  [] 110  Resp:  [16-20] 20  BP: (101-138)/(57-91) 117/74     Physical Exam:      General Appearance:    Alert, cooperative, in no acute distress, frail, oriented   Head:    Normocephalic, without obvious abnormality, atraumatic   Eyes:            Lids and lashes normal, conjunctivae and sclerae normal, no   icterus, no pallor, corneas clear, PERRLA   Ears:    Ears appear intact with no abnormalities noted   Throat:   No oral lesions, no thrush, oral mucosa moist   Neck:   No adenopathy, supple, trachea midline, no thyromegaly, no   carotid bruit, no JVD   Lungs:     Clear to auscultation,respirations regular, even and                  unlabored    Heart:    Irregularly irregular   Chest Wall:    No abnormalities observed    Abdomen:     Normal bowel sounds, no masses, no organomegaly, soft        non-tender, non-distended, no guarding, no rebound                tenderness   Extremities:   1+ bilateral lower ext edema   Pulses:   Pulses palpable and equal bilaterally   Skin:   No bleeding, bruising or rash   Lymph nodes:   No palpable adenopathy   Neurologic:   Cranial nerves 2 - 12 grossly intact, sensation intact, DTR       present and equal bilaterally; gait not assessed.       Results Review:     Imaging Results (Last 24 Hours)     Procedure Component Value Units Date/Time    CT Head Without Contrast [[id number]] Collected: 06/20/21 0243     Updated: 06/20/21 0246    Narrative:      EXAMINATION: CT HEAD WITHOUT CONTRAST    DATE: 6/20/2021 2:05 AM    INDICATION: Lethargy    COMPARISON: CT head 8/24/2020    TECHNIQUE: Noncontrast imaging obtained from the vertex to the skull base.  CT dose lowering techniques were used, to include: automated exposure control, adjustment for patient size, and or use of iterative reconstruction.?    FINDINGS:    Soft Tissues: Mild right facial soft tissue swelling, nonspecific.    Skull: No underlying skull fracture or radiopaque foreign body.    Sinuses: Paranasal sinuses are clear.    Mastoids: Mastoid air cells are clear.     Globes and Orbits: Globes and orbits are intact.    Brain: No acute hemorrhage.  No midline shift, masses, or mass effect.  No evidence of acute infarct by noncontrast CT.    Ventricles and Cisterns: Ventricular size and configuration is within normal limits. Basal cisterns are patent. No abnormal extra-axial fluid collection.    Senescent Changes: Moderate volume loss and chronic microvascular ischemic changes. Arteriosclerosis. 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        Impression:          1. No acute intracranial abnormality.    2. Moderate volume loss and chronic microvascular ischemic changes. Small remote cortical  infarct in the right frontal lobe.    Electronically signed by:  Jovanni Alcazar M.D.    6/20/2021 12:45 AM           Lab Results (last 24 hours)     Procedure Component Value Units Date/Time    Troponin [[id number]]  (Abnormal) Collected: 06/20/21 0848    Specimen: Blood Updated: 06/20/21 0952     Troponin T 0.104 ng/mL     Narrative:      Troponin T Reference Range:  <= 0.03 ng/mL-   Negative for AMI  >0.03 ng/mL-     Abnormal for myocardial necrosis.  Clinicians would have to utilize clinical acumen, EKG, Troponin and serial changes to determine if it is an Acute Myocardial Infarction or myocardial injury due to an underlying chronic condition.       Results may be falsely decreased if patient taking Biotin.      POC Glucose Once [[id number]]  (Normal) Collected: 06/20/21 0926    Specimen: Blood Updated: 06/20/21 0928     Glucose 99 mg/dL      Comment: Serial Number: [id number]:  [id number]       POC Glucose Once [[id number]]  (Normal) Collected: 06/20/21 0729    Specimen: Blood Updated: 06/20/21 0730     Glucose 104 mg/dL      Comment: Serial Number: [id number]:  [id number]       POC Glucose Once [[id number]]  (Normal) Collected: 06/20/21 0528    Specimen: Blood Updated: 06/20/21 0529     Glucose 103 mg/dL      Comment: Serial Number: [id number]:  [id number]       POC Glucose Once [[id number]]  (Normal) Collected: 06/20/21 0438    Specimen: Blood Updated: 06/20/21 0438     Glucose 79 mg/dL      Comment: Serial Number: [id number]:  [id number]       POC Glucose Once [[id number]]  (Abnormal) Collected: 06/20/21 0331    Specimen: Blood Updated: 06/20/21 0332     Glucose 56 mg/dL      Comment: Serial Number: [id number]:  [id number]       COVID PRE-OP / PRE-PROCEDURE SCREENING ORDER (NO ISOLATION) - Swab, Nasopharynx [[id number]]  (Normal) Collected: 06/20/21 0240    Specimen: Swab from Nasopharynx Updated: 06/20/21 0305    Narrative:      The following orders were created for panel order COVID  PRE-OP / PRE-PROCEDURE SCREENING ORDER (NO ISOLATION) - Swab, Nasopharynx.  Procedure                               Abnormality         Status                     ---------                               -----------         ------                     COVID-19,CEPHEID,COR/SORAIDA...[[id number]]  Normal              Final result                 Please view results for these tests on the individual orders.    COVID-19,CEPHEID,COR/SORAIDA/PAD/MAMI IN-HOUSE(OR EMERGENT/ADD-ON),NP SWAB IN TRANSPORT MEDIA 3-4 HR TAT, RT-PCR - Swab, Nasopharynx [[id number]]  (Normal) Collected: 06/20/21 0240    Specimen: Swab from Nasopharynx Updated: 06/20/21 0305     COVID19 Not Detected    Narrative:      Fact sheet for providers: https://www.fda.gov/media/380036/download     Fact sheet for patients: https://www.fda.gov/media/012231/download  Fact sheet for providers: https://www.fda.gov/media/738256/download    Fact sheet for patients: https://www.fda.gov/media/622668/download    Test performed by PCR.    POC Glucose Once [[id number]]  (Abnormal) Collected: 06/20/21 0150    Specimen: Blood Updated: 06/20/21 0152     Glucose 131 mg/dL      Comment: Serial Number: [id number]:  [id number]       POC Glucose Once [[id number]]  (Normal) Collected: 06/20/21 0123    Specimen: Blood Updated: 06/20/21 0124     Glucose 75 mg/dL      Comment: Serial Number: [id number]:  [id number]       Urinalysis With Culture If Indicated - Urine, Catheter [[id number]]  (Abnormal) Collected: 06/20/21 0041    Specimen: Urine, Catheter Updated: 06/20/21 0052     Color, UA Yellow     Appearance, UA Clear     pH, UA <=5.0     Specific Gravity, UA 1.015     Glucose, UA Negative     Ketones, UA Negative     Bilirubin, UA Negative     Blood, UA Trace     Protein, UA Negative     Leuk Esterase, UA Negative     Nitrite, UA Negative     Urobilinogen, UA 1.0 E.U./dL    Urinalysis, Microscopic Only - Urine, Catheter [[id number]]  (Abnormal) Collected: 06/20/21 0041    Specimen:  Urine, Catheter Updated: 06/20/21 0052     RBC, UA 6-12 /HPF      WBC, UA 0-2 /HPF      Bacteria, UA None Seen /HPF      Squamous Epithelial Cells, UA 0-2 /HPF      Hyaline Casts, UA 3-6 /LPF      Methodology Automated Microscopy    POC Glucose Once [[id number]]  (Abnormal) Collected: 06/19/21 2346    Specimen: Blood Updated: 06/19/21 2347     Glucose 45 mg/dL      Comment: Serial Number: [id number]:  [id number]       Troponin [[id number]]  (Abnormal) Collected: 06/19/21 2244    Specimen: Blood Updated: 06/19/21 2313     Troponin T 0.057 ng/mL     Narrative:      Troponin T Reference Range:  <= 0.03 ng/mL-   Negative for AMI  >0.03 ng/mL-     Abnormal for myocardial necrosis.  Clinicians would have to utilize clinical acumen, EKG, Troponin and serial changes to determine if it is an Acute Myocardial Infarction or myocardial injury due to an underlying chronic condition.       Results may be falsely decreased if patient taking Biotin.      Comprehensive Metabolic Panel [[id number]]  (Abnormal) Collected: 06/19/21 2244    Specimen: Blood Updated: 06/19/21 2313     Glucose 58 mg/dL      BUN 33 mg/dL      Creatinine 1.83 mg/dL      Sodium 138 mmol/L      Potassium 3.6 mmol/L      Chloride 108 mmol/L      CO2 19.0 mmol/L      Calcium 8.4 mg/dL      Total Protein 6.9 g/dL      Albumin 3.20 g/dL      ALT (SGPT) 10 U/L      AST (SGOT) 15 U/L      Alkaline Phosphatase 66 U/L      Total Bilirubin 0.7 mg/dL      eGFR Non African Amer 35 mL/min/1.73      Globulin 3.7 gm/dL      A/G Ratio 0.9 g/dL      BUN/Creatinine Ratio 18.0     Anion Gap 11.0 mmol/L     Narrative:      GFR Normal >60  Chronic Kidney Disease <60  Kidney Failure <15      CBC & Differential [[id number]]  (Abnormal) Collected: 06/19/21 2244    Specimen: Blood Updated: 06/19/21 2254    Narrative:      The following orders were created for panel order CBC & Differential.  Procedure                               Abnormality         Status                      ---------                               -----------         ------                     Scan Slide[[id number]]                                                                  CBC Auto Differential[[id number]]        Abnormal            Final result                 Please view results for these tests on the individual orders.    CBC Auto Differential [[id number]]  (Abnormal) Collected: 06/19/21 2244    Specimen: Blood Updated: 06/19/21 2254     WBC 6.80 10*3/mm3      RBC 4.78 10*6/mm3      Hemoglobin 10.3 g/dL      Hematocrit 34.5 %      MCV 72.2 fL      MCH 21.6 pg      MCHC 29.9 g/dL      RDW 24.3 %      RDW-SD 61.7 fl      MPV 7.5 fL      Platelets 237 10*3/mm3      Neutrophil % 84.5 %      Lymphocyte % 8.2 %      Monocyte % 5.0 %      Eosinophil % 0.7 %      Basophil % 1.6 %      Neutrophils, Absolute 5.70 10*3/mm3      Lymphocytes, Absolute 0.60 10*3/mm3      Monocytes, Absolute 0.30 10*3/mm3      Eosinophils, Absolute 0.00 10*3/mm3      Basophils, Absolute 0.10 10*3/mm3      nRBC 0.1 /100 WBC     POC Glucose Once [[id number]]  (Normal) Collected: 06/19/21 2125    Specimen: Blood Updated: 06/19/21 2145     Glucose 97 mg/dL      Comment: Serial Number: [id number]:  [id number]              I reviewed the patient's new clinical results.    Assessment/Plan       Hypoglycemia  NSTEMI -currently not having any symptoms; adding Lovenox; asking cardiology to evaluate; echo ordered  Chronic atrial fib -rate is controlled with bisoprolol; decision made been made previously not to anticoagulate but in the setting of elevated troponins I am going to add Lovenox  Essential hypertension -bisoprolol  Anemia -history of lower GI bleeding but no active bleeding noted currently; monitor hemoglobin closely with the addition of Lovenox  Chronic kidney disease stage IIIb -avoiding nephrotoxins  Mood disorder -continue Lexapro        I discussed the patient's findings and my recommendations with patient.     Elaine Tapia,  MD  06/20/21  10:43 EDT        Electronically signed by Elaine Tapia MD at 06/20/21 1053       {Outbreak/Travel/Exposure Documentation......;  Question Available Choices Patient Response   COVID-19 Outbreak Screen:  Do you currently have a new onset of the following symptoms?        Fever/Chills, Cough, Shortness of air, Loss of taste or smell, No, Unknown  No (06/20/21 0341)   COVID-19 Outbreak Screen: In the last 14 days, have you had contact with anyone who is ill, has show any of the symptoms listed above and/or has been diagnosis with the 2019 Novel Coronavirus? This includes any immediate household members but excludes any patients with whom you have been in contact within your normal work duties wearing proper PPE, if you are a healthcare worker.  Yes, No, Unknown              No (06/20/21 0341)   COVID-19 Outbreak Screen: Who was notified? Free text (not recorded)   Ebola Screening Outbreak Screen: Have you traveled to the [place] or [place] within the past 21 days?  Yes, No, Unknown No (06/20/21 0341)   Ebola Screening Outbreak Screen: Do you have ANY of the following symptoms: Fever/Chills, Vomiting, Diarrhea, Fatigue, Headache, Muscle pain, Unexplained bleeding, Abdominal (stomach) pain, No, Unknown (not recorded)   Ebola Screening Outbreak Screen: Name of Person notified Free text (not recorded)   Travel Screen: Have you traveled in the last month? If so, to what country have you traveled? If [place] what state? Yes, No, Unknown  List of all countries  List of all States No (06/20/21 0342)  (not recorded)  (not recorded)   Infection Risk: Do you currently have the following symptoms?  (If cough is selected, the Tuberculosis Screen is performed.) Cough, Fever, Rash, No No (06/20/21 0342)   Tuberculosis Screen: Do you have any of the following Tuberculosis Risks?  · Have you lived or spent time with anyone who had or may have TB?  · Have you lived in or visited any of the following areas  for more than one month: [place], [place], [place], [place] or [place], the [place] or [place]?  · Do you have HIV/AIDS?  · Have you lived in or worked in a nursing home, homeless shelter, correctional facility, or substance abuse treatment facility?   · No    If Yes do you have any of the following symptoms? Yes responses display to the right    If Yes, symptoms listed are:  Cough greater than or equal to 3 weeks, Loss of appetite, Unexplained weight loss, Night sweats, Bloody sputum or hemoptysis, Hoarseness, Fever, Fatigue, Chest pain, No (not recorded)  (not recorded)   Exposure Screen: Have you been exposed to any of these contagious diseases in the last month? Measles, Chickenpox, Meningitis, Pertussis, Whooping Cough, No No (06/20/21 0342)         Current Facility-Administered Medications   Medication Dose Route Frequency Provider Last Rate Last Admin   • acetaminophen (TYLENOL) 160 MG/5ML solution 650 mg  650 mg Oral Q4H PRN Elaine Tapia MD       • aspirin EC tablet 81 mg  81 mg Oral Daily Elaine Tapia MD   81 mg at 06/23/21 0907   • atorvastatin (LIPITOR) tablet 10 mg  10 mg Oral Nightly Elaine Tapia MD   10 mg at 06/22/21 2232   • bisoprolol (ZEBeta) tablet 5 mg  5 mg Oral Daily Elaine Tapia MD   5 mg at 06/23/21 0907   • dextrose (GLUTOSE) oral gel 15 g  15 g Oral Q15 Min PRN Elaine Tapia MD       • enoxaparin (LOVENOX) syringe 90 mg  1 mg/kg Subcutaneous Q24H John Burleson MD   90 mg at 06/23/21 1316   • escitalopram (LEXAPRO) tablet 10 mg  10 mg Oral Daily Elaine Tapia MD   10 mg at 06/23/21 0907   • finasteride (PROSCAR) tablet 5 mg  5 mg Oral Daily Elaine Tapia MD   5 mg at 06/23/21 0906   • furosemide (LASIX) tablet 40 mg  40 mg Oral Daily Elaine Tapia MD   40 mg at 06/23/21 0907   • glucagon (human recombinant) (GLUCAGEN DIAGNOSTIC) injection 1 mg  1 mg Subcutaneous Q15 Min PRN Elaine Tapia MD       • levETIRAcetam XR (KEPPRA XR) 24 hr tablet 2,000 mg  2,000 mg Oral Daily Elaine Tapia,  MD   2,000 mg at 06/23/21 0906   • ondansetron (ZOFRAN) injection 4 mg  4 mg Intravenous Q6H PRN Elaine Tapia MD       • pantoprazole (PROTONIX) EC tablet 40 mg  40 mg Oral Q AM Elaine Tapia MD   40 mg at 06/23/21 0636   • Pharmacy to Dose enoxaparin (LOVENOX)   Does not apply Continuous PRN Elaine Tapia MD       • potassium chloride (K-DUR,KLOR-CON) CR tablet 40 mEq  40 mEq Oral Daily Elaine Tapia MD   40 mEq at 06/23/21 0906   • sodium chloride 0.9 % flush 10 mL  10 mL Intravenous PRN Abby Traylor APRN       • sodium chloride 0.9 % flush 10 mL  10 mL Intravenous PRN Elaine Tapia MD       • sodium chloride 0.9 % flush 3 mL  3 mL Intravenous Q12H Elaine Tapia MD   3 mL at 06/23/21 0907   • sodium chloride 0.9 % flush 3-10 mL  3-10 mL Intravenous PRN Elaine Tapia MD            Physician Progress Notes (most recent note)      Geovanny Gomez MD at 06/22/21 1324          Referring Provider: Dr. Tapia    Reason for follow-up: Shortness of breath     Patient Care Team:  John Burleson MD as PCP - General  John Burleson MD as PCP - Family Medicine    Subjective .  He still has shortness of breath    Objective  Lying in bed comfortably     Review of Systems   Constitutional: Negative for fever and malaise/fatigue.   Cardiovascular: Negative for chest pain, dyspnea on exertion and palpitations.   Respiratory: Negative for cough and shortness of breath.    Skin: Negative for rash.   Gastrointestinal: Negative for abdominal pain, nausea and vomiting.   Neurological: Negative for focal weakness and headaches.   All other systems reviewed and are negative.      Patient has no known allergies.    Scheduled Meds:aspirin, 81 mg, Oral, Daily  atorvastatin, 10 mg, Oral, Nightly  bisoprolol, 5 mg, Oral, Daily  enoxaparin, 1 mg/kg, Subcutaneous, Q24H  escitalopram, 10 mg, Oral, Daily  finasteride, 5 mg, Oral, Daily  furosemide, 40 mg, Oral, Daily  levETIRAcetam XR, 2,000 mg, Oral, Daily  pantoprazole, 40 mg, Oral, Q  "AM  potassium chloride, 40 mEq, Oral, Daily  sodium chloride, 3 mL, Intravenous, Q12H      Continuous Infusions:Pharmacy to Dose enoxaparin (LOVENOX),       PRN Meds:.•  acetaminophen  •  dextrose  •  glucagon (human recombinant)  •  ondansetron  •  Pharmacy to Dose enoxaparin (LOVENOX)  •  [COMPLETED] Insert peripheral IV **AND** sodium chloride  •  sodium chloride  •  sodium chloride        VITAL SIGNS  Vitals:    06/22/21 0554 06/22/21 0816 06/22/21 0954 06/22/21 1142   BP:   117/79 118/76   BP Location:    Left arm   Patient Position:    Lying   Pulse:   94 87   Resp:    16   Temp:    98.4 °F (36.9 °C)   TempSrc:    Oral   SpO2:    97%   Weight: 93.7 kg (206 lb 8 oz) 91.7 kg (202 lb 1.6 oz)     Height: 180.3 cm (70.98\")          Flowsheet Rows      First Filed Value   Admission Height  180.3 cm (71\") Documented at 06/19/2021 2135   Admission Weight  85.3 kg (188 lb) Documented at 06/19/2021 2135           TELEMETRY: Atrial fibrillation    Physical Exam:  Constitutional:       Appearance: Well-developed.   Eyes:      General: No scleral icterus.     Conjunctiva/sclera: Conjunctivae normal.   HENT:      Head: Normocephalic and atraumatic.   Neck:      Vascular: No carotid bruit or JVD.   Pulmonary:      Effort: Pulmonary effort is normal.      Breath sounds: Normal breath sounds. No wheezing. No rales.   Cardiovascular:      Normal rate. Regular rhythm.      Murmurs: There is a systolic murmur.   Pulses:     Intact distal pulses.   Abdominal:      General: Bowel sounds are normal.      Palpations: Abdomen is soft.   Musculoskeletal:      Cervical back: Normal range of motion and neck supple. Skin:     General: Skin is warm and dry.      Findings: No rash.   Neurological:      Mental Status: Alert.          Results Review:   I reviewed the patient's new clinical results.  Lab Results (last 24 hours)     Procedure Component Value Units Date/Time    POC Glucose Once [[id number]]  (Abnormal) Collected: 06/22/21 1314    " Specimen: Blood Updated: 06/22/21 1315     Glucose 185 mg/dL      Comment: Serial Number: [id number]:  [id number]       POC Glucose Once [[id number]]  (Abnormal) Collected: 06/22/21 1151    Specimen: Blood Updated: 06/22/21 1152     Glucose 183 mg/dL      Comment: Serial Number: [id number]:  [id number]       POC Glucose Once [[id number]]  (Abnormal) Collected: 06/21/21 2213    Specimen: Blood Updated: 06/22/21 1145     Glucose 172 mg/dL      Comment: Serial Number: [id number]:  [id number]       POC Glucose Once [[id number]]  (Abnormal) Collected: 06/21/21 1120    Specimen: Blood Updated: 06/22/21 1144     Glucose 169 mg/dL      Comment: Serial Number: [id number]:  [id number]       POC Glucose Once [[id number]]  (Abnormal) Collected: 06/21/21 0710    Specimen: Blood Updated: 06/22/21 1143     Glucose 151 mg/dL      Comment: Serial Number: [id number]:  [id number]       POC Glucose Once [[id number]]  (Abnormal) Collected: 06/22/21 0857    Specimen: Blood Updated: 06/22/21 0858     Glucose 131 mg/dL      Comment: Serial Number: [id number]:  [id number]       POC Glucose Once [[id number]]  (Abnormal) Collected: 06/22/21 0717    Specimen: Blood Updated: 06/22/21 0718     Glucose 128 mg/dL      Comment: Serial Number: [id number]:  [id number]       POC Glucose Once [[id number]]  (Abnormal) Collected: 06/22/21 0624    Specimen: Blood Updated: 06/22/21 0626     Glucose 122 mg/dL      Comment: Serial Number: [id number]:  [id number]       POC Glucose Once [[id number]]  (Abnormal) Collected: 06/22/21 0507    Specimen: Blood Updated: 06/22/21 0507     Glucose 134 mg/dL      Comment: Serial Number: [id number]:  [id number]       CBC & Differential [[id number]]  (Abnormal) Collected: 06/22/21 0344    Specimen: Blood Updated: 06/22/21 0433    Narrative:      The following orders were created for panel order CBC & Differential.  Procedure                               Abnormality          Status                     ---------                               -----------         ------                     Scan Slide[[id number]]                                                                  CBC Auto Differential[[id number]]        Abnormal            Final result                 Please view results for these tests on the individual orders.    CBC Auto Differential [[id number]]  (Abnormal) Collected: 06/22/21 0344    Specimen: Blood Updated: 06/22/21 0433     WBC 6.20 10*3/mm3      RBC 4.90 10*6/mm3      Hemoglobin 10.6 g/dL      Hematocrit 35.3 %      MCV 71.9 fL      MCH 21.5 pg      MCHC 29.9 g/dL      RDW 23.9 %      RDW-SD 59.9 fl      MPV 8.4 fL      Platelets 262 10*3/mm3      Neutrophil % 72.1 %      Lymphocyte % 14.9 %      Monocyte % 9.5 %      Eosinophil % 2.0 %      Basophil % 1.5 %      Neutrophils, Absolute 4.40 10*3/mm3      Lymphocytes, Absolute 0.90 10*3/mm3      Monocytes, Absolute 0.60 10*3/mm3      Eosinophils, Absolute 0.10 10*3/mm3      Basophils, Absolute 0.10 10*3/mm3      nRBC 0.3 /100 WBC     Basic Metabolic Panel [[id number]]  (Abnormal) Collected: 06/22/21 0344    Specimen: Blood Updated: 06/22/21 0432     Glucose 140 mg/dL      BUN 40 mg/dL      Creatinine 2.46 mg/dL      Sodium 135 mmol/L      Potassium 5.4 mmol/L      Chloride 105 mmol/L      CO2 18.0 mmol/L      Calcium 8.5 mg/dL      eGFR Non African Amer 25 mL/min/1.73      BUN/Creatinine Ratio 16.3     Anion Gap 12.0 mmol/L     Narrative:      GFR Normal >60  Chronic Kidney Disease <60  Kidney Failure <15      POC Glucose Once [[id number]]  (Abnormal) Collected: 06/22/21 0229    Specimen: Blood Updated: 06/22/21 0230     Glucose 134 mg/dL      Comment: Serial Number: [id number]:  [id number]       POC Glucose Once [[id number]]  (Abnormal) Collected: 06/22/21 0050    Specimen: Blood Updated: 06/22/21 0053     Glucose 143 mg/dL      Comment: Serial Number: [id number]:  [id number]       POC Glucose Once [[id number]]   (Abnormal) Collected: 06/21/21 2021    Specimen: Blood Updated: 06/21/21 2024     Glucose 156 mg/dL      Comment: Serial Number: [id number]:  [id number]             Imaging Results (Last 24 Hours)     ** No results found for the last 24 hours. **          EKG      I personally viewed and interpreted the patient's EKG/Telemetry data:    ECHOCARDIOGRAM:    STRESS MYOVIEW:    CARDIAC CATHETERIZATION:    OTHER:         Assessment/Plan     Active Problems:    Hypoglycemia  Congestive heart failure  Elevated troponin  Atrial fibrillation  Hypertension  Chronic insufficiency  Anemia    Noted with shortness of breath and had echocardiogram which showed biventricular failure and his LV ejection fraction about 20 to 25%  Patient also had elevated troponin which could be secondary to atrial fibrillation or demand ischemia with renal insufficiency  Patient also has chronic renal insufficiency and is followed by nephrologist  Patient is currently on beta-blockers and diuretics only  Patient is not on any ACE inhibitor because of renal insufficiency but if his blood pressure permits we will place him on hydralazine.    I discussed the patients findings and my recommendations with patient and nurse    Geovanny Gomez MD  06/22/21  13:24 EDT                Electronically signed by Geovanny Gomez MD at 06/22/21 1324          Consult Notes (most recent note)      Geovanny Gomez MD at 06/20/21 1715      Consult Orders    1. Inpatient Cardiology Consult [[id number]] ordered by Elaine Tapia MD at 06/20/21 1040                 Referring Provider: Dr. Tapia  Reason for Consultation: Shortness of breath and fatigue and tiredness    Patient Care Team:  John Burleson MD as PCP - General  John Burleson MD as PCP - Family Medicine    Chief complaint shortness of breath and fatigue and tiredness    Subjective .     History of present illness:  Jim Montaño is a 89 y.o. male with history of atrial fibrillation hypertension  hyperlipidemia and other medical problems including colon cancer presented to the hospital with fatigue and shortness of breath.  His blood sugar was very low and patient was also lethargic.  No complaints of any chest pain palpitation dizziness syncope or swelling of the feet.  Patient responded to IV D50 but has low blood sugars throughout the night.  Patient had borderline elevated troponin and hence a cardiology consult was called.  Patient was taking all his medicines regularly.    Review of Systems   Constitutional: Positive for malaise/fatigue. Negative for fever.   HENT: Negative for ear pain and nosebleeds.    Eyes: Negative for blurred vision and double vision.   Cardiovascular: Negative for chest pain, dyspnea on exertion and palpitations.   Respiratory: Positive for shortness of breath. Negative for cough.    Skin: Negative for rash.   Musculoskeletal: Negative for joint pain.   Gastrointestinal: Negative for abdominal pain, nausea and vomiting.   Neurological: Negative for focal weakness and headaches.   Psychiatric/Behavioral: Negative for depression. The patient is not nervous/anxious.    All other systems reviewed and are negative.      History  Past Medical History:   Diagnosis Date   • A-fib (CMS/HCC)    • Cancer (CMS/HCC)     colon   • Hypertension    • Type 2 diabetes mellitus (CMS/HCC)        Past Surgical History:   Procedure Laterality Date   • COLONOSCOPY N/A 3/14/2021    Procedure: COLONOSCOPY with endoscopic sclerotherapy and endoscopic clipping;  Surgeon: Goldy Healy MD;  Location: [facility] ENDOSCOPY;  Service: Gastroenterology;  Laterality: N/A;  post: diverticulosis, post surgical changes   • PROSTATE SURGERY         Family History   Problem Relation Age of Onset   • Cancer Father         multipule mylomia        Social History     Tobacco Use   • Smoking status: Never Smoker   • Smokeless tobacco: Never Used   Substance Use Topics   • Alcohol use: Not Currently   • Drug use:  Never        Medications Prior to Admission   Medication Sig Dispense Refill Last Dose   • aspirin (Aspir-Low) 81 MG EC tablet Take 81 mg by mouth Daily.      • bisoprolol (ZEBeta) 5 MG tablet Take 5 mg by mouth Daily.      • Cholecalciferol (vitamin D3) 125 MCG (5000 UT) capsule capsule Take 5,000 Units by mouth Daily.      • escitalopram (LEXAPRO) 10 MG tablet Take 10 mg by mouth Daily.      • famotidine (PEPCID) 40 MG tablet Take 1 tablet by mouth Daily. 30 tablet 3    • finasteride (PROSCAR) 5 MG tablet Take 5 mg by mouth Daily.      • furosemide (LASIX) 20 MG tablet Take 40 mg by mouth Daily.      • insulin aspart (novoLOG) 100 UNIT/ML injection Inject 15 Units under the skin into the appropriate area as directed 3 (Three) Times a Day Before Meals.      • Insulin Glargine (BASAGLAR KWIKPEN SC) Inject 45 Units under the skin into the appropriate area as directed Every Night.      • levETIRAcetam XR (KEPPRA XR) 500 MG 24 hr tablet Take 2,000 mg by mouth Daily.      • potassium chloride (K-DUR,KLOR-CON) 20 MEQ CR tablet Take 40 mEq by mouth Daily.      • pravastatin (PRAVACHOL) 40 MG tablet Take 40 mg by mouth Daily.            Patient has no known allergies.    Scheduled Meds:aspirin, 81 mg, Oral, Daily  bisoprolol, 5 mg, Oral, Daily  enoxaparin, 1 mg/kg, Subcutaneous, Q24H  escitalopram, 10 mg, Oral, Daily  finasteride, 5 mg, Oral, Daily  furosemide, 40 mg, Oral, Daily  levETIRAcetam XR, 2,000 mg, Oral, Daily  pantoprazole, 40 mg, Oral, Q AM  potassium chloride, 40 mEq, Oral, Daily  sodium chloride, 3 mL, Intravenous, Q12H      Continuous Infusions:Pharmacy to Dose enoxaparin (LOVENOX),       PRN Meds:.•  acetaminophen  •  dextrose  •  glucagon (human recombinant)  •  ondansetron  •  Pharmacy to Dose enoxaparin (LOVENOX)  •  [COMPLETED] Insert peripheral IV **AND** sodium chloride  •  sodium chloride  •  sodium chloride    Objective     VITAL SIGNS  Vitals:    06/20/21 0900 06/20/21 1112 06/20/21 1243 06/20/21  "1600   BP: 117/74 117/74 103/72 91/59   BP Location: Left arm  Right arm Left arm   Patient Position: Lying  Lying Lying   Pulse: 110  96 80   Resp: 20  16 20   Temp: 97.4 °F (36.3 °C)  97.3 °F (36.3 °C) 97.8 °F (36.6 °C)   TempSrc: Oral  Oral Axillary   SpO2: 95%  93% 92%   Weight:  85.7 kg (189 lb)     Height:  177.8 cm (70\")         Flowsheet Rows      First Filed Value   Admission Height  180.3 cm (71\") Documented at 06/19/2021 2135   Admission Weight  85.3 kg (188 lb) Documented at 06/19/2021 2135           TELEMETRY: Atrial fibrillation    Physical Exam:  Constitutional:       Appearance: Well-developed.   Eyes:      General: No scleral icterus.     Conjunctiva/sclera: Conjunctivae normal.      Pupils: Pupils are equal, round, and reactive to light.   HENT:      Head: Normocephalic and atraumatic.   Neck:      Vascular: No carotid bruit or JVD.   Pulmonary:      Effort: Pulmonary effort is normal.      Breath sounds: Normal breath sounds. No wheezing. No rales.   Cardiovascular:      Normal rate. Irregularly irregular rhythm.      Murmurs: There is a systolic murmur.   Pulses:     Intact distal pulses.   Abdominal:      General: Bowel sounds are normal.      Palpations: Abdomen is soft.   Musculoskeletal: Normal range of motion.      Cervical back: Normal range of motion and neck supple. Skin:     General: Skin is warm and dry.      Findings: No rash.   Neurological:      Mental Status: Alert.      Comments: No focal deficits          Results Review:   I reviewed the patient's new clinical results.  Lab Results (last 24 hours)     Procedure Component Value Units Date/Time    Troponin [[id number]] Collected: 06/20/21 1706    Specimen: Blood Updated: 06/20/21 1715    POC Glucose Once [[id number]]  (Abnormal) Collected: 06/20/21 1436    Specimen: Blood Updated: 06/20/21 1436     Glucose 159 mg/dL      Comment: Serial Number: [id number]:  [id number]       POC Glucose Once [[id number]]  (Abnormal) Collected: " 06/20/21 1238    Specimen: Blood Updated: 06/20/21 1239     Glucose 142 mg/dL      Comment: Serial Number: [id number]:  [id number]       Troponin [[id number]]  (Abnormal) Collected: 06/20/21 0848    Specimen: Blood Updated: 06/20/21 0952     Troponin T 0.104 ng/mL     Narrative:      Troponin T Reference Range:  <= 0.03 ng/mL-   Negative for AMI  >0.03 ng/mL-     Abnormal for myocardial necrosis.  Clinicians would have to utilize clinical acumen, EKG, Troponin and serial changes to determine if it is an Acute Myocardial Infarction or myocardial injury due to an underlying chronic condition.       Results may be falsely decreased if patient taking Biotin.      POC Glucose Once [[id number]]  (Normal) Collected: 06/20/21 0926    Specimen: Blood Updated: 06/20/21 0928     Glucose 99 mg/dL      Comment: Serial Number: [id number]:  [id number]       POC Glucose Once [[id number]]  (Normal) Collected: 06/20/21 0729    Specimen: Blood Updated: 06/20/21 0730     Glucose 104 mg/dL      Comment: Serial Number: [id number]:  [id number]       POC Glucose Once [[id number]]  (Normal) Collected: 06/20/21 0528    Specimen: Blood Updated: 06/20/21 0529     Glucose 103 mg/dL      Comment: Serial Number: [id number]:  [id number]       POC Glucose Once [[id number]]  (Normal) Collected: 06/20/21 0438    Specimen: Blood Updated: 06/20/21 0438     Glucose 79 mg/dL      Comment: Serial Number: [id number]:  [id number]       POC Glucose Once [[id number]]  (Abnormal) Collected: 06/20/21 0331    Specimen: Blood Updated: 06/20/21 0332     Glucose 56 mg/dL      Comment: Serial Number: [id number]:  [id number]       COVID PRE-OP / PRE-PROCEDURE SCREENING ORDER (NO ISOLATION) - Swab, Nasopharynx [[id number]]  (Normal) Collected: 06/20/21 0240    Specimen: Swab from Nasopharynx Updated: 06/20/21 0305    Narrative:      The following orders were created for panel order COVID PRE-OP / PRE-PROCEDURE SCREENING ORDER (NO  ISOLATION) - Swab, Nasopharynx.  Procedure                               Abnormality         Status                     ---------                               -----------         ------                     COVID-19,CEPHEID,COR/SORAIDA...[[id number]]  Normal              Final result                 Please view results for these tests on the individual orders.    COVID-19,CEPHEID,COR/SORAIDA/PAD/[facility] IN-HOUSE(OR EMERGENT/ADD-ON),NP SWAB IN TRANSPORT MEDIA 3-4 HR TAT, RT-PCR - Swab, Nasopharynx [[id number]]  (Normal) Collected: 06/20/21 0240    Specimen: Swab from Nasopharynx Updated: 06/20/21 0305     COVID19 Not Detected    Narrative:      Fact sheet for providers: https://www.fda.gov/media/454312/download     Fact sheet for patients: https://www.fda.gov/media/427824/download  Fact sheet for providers: https://www.fda.gov/media/436106/download    Fact sheet for patients: https://www.fda.gov/media/146487/download    Test performed by PCR.    POC Glucose Once [[id number]]  (Abnormal) Collected: 06/20/21 0150    Specimen: Blood Updated: 06/20/21 0152     Glucose 131 mg/dL      Comment: Serial Number: [id number]:  [id number]       POC Glucose Once [[id number]]  (Normal) Collected: 06/20/21 0123    Specimen: Blood Updated: 06/20/21 0124     Glucose 75 mg/dL      Comment: Serial Number: [id number]:  [id number]       Urinalysis With Culture If Indicated - Urine, Catheter [[id number]]  (Abnormal) Collected: 06/20/21 0041    Specimen: Urine, Catheter Updated: 06/20/21 0052     Color, UA Yellow     Appearance, UA Clear     pH, UA <=5.0     Specific Gravity, UA 1.015     Glucose, UA Negative     Ketones, UA Negative     Bilirubin, UA Negative     Blood, UA Trace     Protein, UA Negative     Leuk Esterase, UA Negative     Nitrite, UA Negative     Urobilinogen, UA 1.0 E.U./dL    Urinalysis, Microscopic Only - Urine, Catheter [[id number]]  (Abnormal) Collected: 06/20/21 0041    Specimen: Urine, Catheter Updated: 06/20/21 0052      RBC, UA 6-12 /HPF      WBC, UA 0-2 /HPF      Bacteria, UA None Seen /HPF      Squamous Epithelial Cells, UA 0-2 /HPF      Hyaline Casts, UA 3-6 /LPF      Methodology Automated Microscopy    POC Glucose Once [[id number]]  (Abnormal) Collected: 06/19/21 2346    Specimen: Blood Updated: 06/19/21 2347     Glucose 45 mg/dL      Comment: Serial Number: [id number]:  [id number]       Troponin [[id number]]  (Abnormal) Collected: 06/19/21 2244    Specimen: Blood Updated: 06/19/21 2313     Troponin T 0.057 ng/mL     Narrative:      Troponin T Reference Range:  <= 0.03 ng/mL-   Negative for AMI  >0.03 ng/mL-     Abnormal for myocardial necrosis.  Clinicians would have to utilize clinical acumen, EKG, Troponin and serial changes to determine if it is an Acute Myocardial Infarction or myocardial injury due to an underlying chronic condition.       Results may be falsely decreased if patient taking Biotin.      Comprehensive Metabolic Panel [[id number]]  (Abnormal) Collected: 06/19/21 2244    Specimen: Blood Updated: 06/19/21 2313     Glucose 58 mg/dL      BUN 33 mg/dL      Creatinine 1.83 mg/dL      Sodium 138 mmol/L      Potassium 3.6 mmol/L      Chloride 108 mmol/L      CO2 19.0 mmol/L      Calcium 8.4 mg/dL      Total Protein 6.9 g/dL      Albumin 3.20 g/dL      ALT (SGPT) 10 U/L      AST (SGOT) 15 U/L      Alkaline Phosphatase 66 U/L      Total Bilirubin 0.7 mg/dL      eGFR Non African Amer 35 mL/min/1.73      Globulin 3.7 gm/dL      A/G Ratio 0.9 g/dL      BUN/Creatinine Ratio 18.0     Anion Gap 11.0 mmol/L     Narrative:      GFR Normal >60  Chronic Kidney Disease <60  Kidney Failure <15      CBC & Differential [[id number]]  (Abnormal) Collected: 06/19/21 2244    Specimen: Blood Updated: 06/19/21 2254    Narrative:      The following orders were created for panel order CBC & Differential.  Procedure                               Abnormality         Status                     ---------                                -----------         ------                     Scan Slide[[id number]]                                                                  CBC Auto Differential[[id number]]        Abnormal            Final result                 Please view results for these tests on the individual orders.    CBC Auto Differential [[id number]]  (Abnormal) Collected: 06/19/21 2244    Specimen: Blood Updated: 06/19/21 2254     WBC 6.80 10*3/mm3      RBC 4.78 10*6/mm3      Hemoglobin 10.3 g/dL      Hematocrit 34.5 %      MCV 72.2 fL      MCH 21.6 pg      MCHC 29.9 g/dL      RDW 24.3 %      RDW-SD 61.7 fl      MPV 7.5 fL      Platelets 237 10*3/mm3      Neutrophil % 84.5 %      Lymphocyte % 8.2 %      Monocyte % 5.0 %      Eosinophil % 0.7 %      Basophil % 1.6 %      Neutrophils, Absolute 5.70 10*3/mm3      Lymphocytes, Absolute 0.60 10*3/mm3      Monocytes, Absolute 0.30 10*3/mm3      Eosinophils, Absolute 0.00 10*3/mm3      Basophils, Absolute 0.10 10*3/mm3      nRBC 0.1 /100 WBC     POC Glucose Once [[id number]]  (Normal) Collected: 06/19/21 2125    Specimen: Blood Updated: 06/19/21 2145     Glucose 97 mg/dL      Comment: Serial Number: [id number]:  [id number]             Imaging Results (Last 24 Hours)     Procedure Component Value Units Date/Time    CT Head Without Contrast [[id number]] Collected: 06/20/21 0243     Updated: 06/20/21 0246    Narrative:      EXAMINATION: CT HEAD WITHOUT CONTRAST    DATE: 6/20/2021 2:05 AM    INDICATION: Lethargy    COMPARISON: CT head 8/24/2020    TECHNIQUE: Noncontrast imaging obtained from the vertex to the skull base.  CT dose lowering techniques were used, to include: automated exposure control, adjustment for patient size, and or use of iterative reconstruction.?    FINDINGS:    Soft Tissues: Mild right facial soft tissue swelling, nonspecific.    Skull: No underlying skull fracture or radiopaque foreign body.    Sinuses: Paranasal sinuses are clear.    Mastoids: Mastoid air cells are clear.      Globes and Orbits: Globes and orbits are intact.    Brain: No acute hemorrhage.  No midline shift, masses, or mass effect.  No evidence of acute infarct by noncontrast CT.    Ventricles and Cisterns: Ventricular size and configuration is within normal limits. Basal cisterns are patent. No abnormal extra-axial fluid collection.    Senescent Changes: Moderate volume loss and chronic microvascular ischemic changes. Arteriosclerosis. 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        Impression:          1. No acute intracranial abnormality.    2. Moderate volume loss and chronic microvascular ischemic changes. 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    Electronically signed by:  Jovanni Alcazar M.D.    6/20/2021 12:45 AM          EKG      I personally viewed and interpreted the patient's EKG/Telemetry data:    ECHOCARDIOGRAM:  · The left ventricular cavity is severely dilated.  · Left ventricular ejection fraction appears to be 21 - 25%.  · The right ventricular cavity is severely dilated.  · Severely reduced right ventricular systolic function noted.  · The right atrial cavity is moderately dilated.  · Moderate mitral valve regurgitation is present.  · Mild dilation of the aortic root is present.  · No pericardial effusion noted    STRESS MYOVIEW:    CARDIAC CATHETERIZATION:    OTHER:         Assessment/Plan     Active Problems:    Hypoglycemia  Elevated troponin  Shortness of breath  Atrial fibrillation  Hypertension  Chronic renal sufficiency  Anemia      Patient presented with shortness of breath and fatigue and had hypoglycemia initially and is getting better now  Patient also had elevated troponin but could be type II MI from stress and hypoglycemia and renal insufficiency  Patient however had an echocardiogram which showed severe LV dysfunction with an EF of 20 to 25% and also had severe right ventricular dysfunction and agitation  Patient will  be continued on beta-blockers at this time.  He cannot have ACE inhibitor's because of renal insufficiency but will place him on hydralazine if his blood pressure permits.  We will try conservative medical therapy because of his advanced age and not do any further testing at this time including cardia catheterization especially because of his chronic renal insufficiency and history of GI bleeding.  Patient is not on any anticoagulants because of his history of GI bleed and patient's decision.    I discussed the patients findings and my recommendations with patient and nurse    Geovanny Gomez MD  06/20/21  17:15 EDT              Electronically signed by Geovanny Gomez MD at 06/20/21 3528          Physical Therapy Notes (most recent note)      Sunshine Matos PTA at 06/23/21 1132  Version 1 of 1        Pt would benefit from Inpatient Rehabilitation placement at discharge from facility and requires no DME at discharge.   Pt desires Inpatient Rehabilitation placement at discharge. Pt cooperative; agreeable to therapeutic recommendations and plan of care.                 Electronically signed by Sunshine Matos PTA at 06/23/21 4148       Occupational Therapy Notes (most recent note)    No notes exist for this encounter.

## 2021-06-23 NOTE — PLAN OF CARE
Goal Outcome Evaluation:  Plan of Care Reviewed With: patient        Progress: no change  Outcome Summary: pt rested most of the night with no complaints

## 2021-06-23 NOTE — PROGRESS NOTES
"Referring Provider: Dr. Tapia    Reason for follow-up: Shortness of breath     Patient Care Team:  John Burleson MD as PCP - General  John Burleson MD as PCP - Family Medicine    Subjective .  He still has shortness of breath    Objective  Lying in bed comfortably     Review of Systems   Constitutional: Negative for fever and malaise/fatigue.   Cardiovascular: Negative for chest pain, dyspnea on exertion and palpitations.   Respiratory: Negative for cough and shortness of breath.    Skin: Negative for rash.   Gastrointestinal: Negative for abdominal pain, nausea and vomiting.   Neurological: Negative for focal weakness and headaches.   All other systems reviewed and are negative.      Patient has no known allergies.    Scheduled Meds:aspirin, 81 mg, Oral, Daily  atorvastatin, 10 mg, Oral, Nightly  bisoprolol, 5 mg, Oral, Daily  enoxaparin, 1 mg/kg, Subcutaneous, Q24H  escitalopram, 10 mg, Oral, Daily  finasteride, 5 mg, Oral, Daily  furosemide, 40 mg, Oral, Daily  levETIRAcetam XR, 2,000 mg, Oral, Daily  pantoprazole, 40 mg, Oral, Q AM  potassium chloride, 40 mEq, Oral, Daily  sodium chloride, 3 mL, Intravenous, Q12H      Continuous Infusions:Pharmacy to Dose enoxaparin (LOVENOX),       PRN Meds:.•  acetaminophen  •  dextrose  •  glucagon (human recombinant)  •  ondansetron  •  Pharmacy to Dose enoxaparin (LOVENOX)  •  [COMPLETED] Insert peripheral IV **AND** sodium chloride  •  sodium chloride  •  sodium chloride        VITAL SIGNS  Vitals:    06/22/21 1938 06/23/21 0352 06/23/21 0907 06/23/21 1201   BP: 106/70 104/71 114/75 112/75   BP Location: Left arm Left arm     Patient Position: Lying Lying     Pulse: 82 73 77 75   Resp: 18 17  16   Temp: 97.6 °F (36.4 °C)   97.3 °F (36.3 °C)   TempSrc: Oral   Oral   SpO2: 93% 96%  97%   Weight:  91.9 kg (202 lb 8 oz)     Height:           Flowsheet Rows      First Filed Value   Admission Height  180.3 cm (71\") Documented at 06/19/2021 2135   Admission Weight  85.3 kg " (188 lb) Documented at 06/19/2021 2135           TELEMETRY: Atrial fibrillation    Physical Exam:  Constitutional:       Appearance: Well-developed.   Eyes:      General: No scleral icterus.     Conjunctiva/sclera: Conjunctivae normal.   HENT:      Head: Normocephalic and atraumatic.   Neck:      Vascular: No carotid bruit or JVD.   Pulmonary:      Effort: Pulmonary effort is normal.      Breath sounds: Normal breath sounds. No wheezing. No rales.   Cardiovascular:      Normal rate. Regular rhythm.      Murmurs: There is a systolic murmur.   Pulses:     Intact distal pulses.   Abdominal:      General: Bowel sounds are normal.      Palpations: Abdomen is soft.   Musculoskeletal:      Cervical back: Normal range of motion and neck supple. Skin:     General: Skin is warm and dry.      Findings: No rash.   Neurological:      Mental Status: Alert.          Results Review:   I reviewed the patient's new clinical results.  Lab Results (last 24 hours)     Procedure Component Value Units Date/Time    POC Glucose Once [[id number]]  (Abnormal) Collected: 06/23/21 1617    Specimen: Blood Updated: 06/23/21 1618     Glucose 227 mg/dL      Comment: Serial Number: [id number]:  [id number]       POC Glucose Once [[id number]]  (Abnormal) Collected: 06/23/21 1136    Specimen: Blood Updated: 06/23/21 1137     Glucose 165 mg/dL      Comment: Serial Number: [id number]:  [id number]       POC Glucose Once [[id number]]  (Abnormal) Collected: 06/23/21 0729    Specimen: Blood Updated: 06/23/21 0730     Glucose 135 mg/dL      Comment: Serial Number: [id number]:  [id number]       Basic Metabolic Panel [[id number]]  (Abnormal) Collected: 06/23/21 0446    Specimen: Blood Updated: 06/23/21 0700     Glucose 142 mg/dL      BUN 42 mg/dL      Creatinine 2.52 mg/dL      Sodium 136 mmol/L      Potassium 4.7 mmol/L      Chloride 105 mmol/L      CO2 18.0 mmol/L      Calcium 8.3 mg/dL      eGFR Non African Amer 24 mL/min/1.73       BUN/Creatinine Ratio 16.7     Anion Gap 13.0 mmol/L     Narrative:      GFR Normal >60  Chronic Kidney Disease <60  Kidney Failure <15      CBC & Differential [[id number]]  (Abnormal) Collected: 06/23/21 0446    Specimen: Blood Updated: 06/23/21 0641    Narrative:      The following orders were created for panel order CBC & Differential.  Procedure                               Abnormality         Status                     ---------                               -----------         ------                     Scan Slide[[id number]]                                                                  CBC Auto Differential[[id number]]        Abnormal            Final result                 Please view results for these tests on the individual orders.    CBC Auto Differential [[id number]]  (Abnormal) Collected: 06/23/21 0446    Specimen: Blood Updated: 06/23/21 0641     WBC 6.10 10*3/mm3      RBC 5.01 10*6/mm3      Hemoglobin 10.9 g/dL      Hematocrit 36.7 %      MCV 73.3 fL      MCH 21.7 pg      MCHC 29.6 g/dL      RDW 23.7 %      RDW-SD 60.4 fl      MPV 8.9 fL      Platelets 216 10*3/mm3      Neutrophil % 75.0 %      Lymphocyte % 12.0 %      Monocyte % 9.4 %      Eosinophil % 2.1 %      Basophil % 1.5 %      Neutrophils, Absolute 4.60 10*3/mm3      Lymphocytes, Absolute 0.70 10*3/mm3      Monocytes, Absolute 0.60 10*3/mm3      Eosinophils, Absolute 0.10 10*3/mm3      Basophils, Absolute 0.10 10*3/mm3      nRBC 0.3 /100 WBC     Basic Metabolic Panel [[id number]]  (Abnormal) Collected: 06/22/21 2331    Specimen: Blood Updated: 06/23/21 0110     Glucose 157 mg/dL      BUN 43 mg/dL      Creatinine 2.78 mg/dL      Sodium 137 mmol/L      Potassium 4.6 mmol/L      Chloride 106 mmol/L      CO2 20.0 mmol/L      Calcium 8.5 mg/dL      eGFR Non African Amer 21 mL/min/1.73      BUN/Creatinine Ratio 15.5     Anion Gap 11.0 mmol/L     Narrative:      GFR Normal >60  Chronic Kidney Disease <60  Kidney Failure <15            Imaging Results  (Last 24 Hours)     ** No results found for the last 24 hours. **          EKG      I personally viewed and interpreted the patient's EKG/Telemetry data:    ECHOCARDIOGRAM:    STRESS MYOVIEW:    CARDIAC CATHETERIZATION:    OTHER:         Assessment/Plan     Active Problems:    Hypoglycemia  Congestive heart failure  Elevated troponin  Atrial fibrillation  Hypertension  Chronic insufficiency  Anemia    Noted with shortness of breath and had echocardiogram which showed biventricular failure and his LV ejection fraction about 20 to 25%  Patient also had elevated troponin which could be secondary to atrial fibrillation or demand ischemia with renal insufficiency  Patient also has chronic renal insufficiency and is followed by nephrologist  Patient is currently on beta-blockers and diuretics only  Patient is not on any ACE inhibitor because of renal insufficiency but if his blood pressure permits we will place him on hydralazine.  Since his blood pressure still on the lower side we will continue with beta-blockers and follow him as an outpatient    I discussed the patients findings and my recommendations with patient and nurse    Geovanny Gomez MD  06/23/21  18:30 EDT

## 2021-06-24 NOTE — PLAN OF CARE
Problem: Fall Injury Risk  Goal: Absence of Fall and Fall-Related Injury  Outcome: Ongoing, Progressing  Intervention: Identify and Manage Contributors to Fall Injury Risk  Description: Reassess fall risk frequently and with change in status or transfer to another level of care.  Communicate fall injury risk to all healthcare team members (e.g., rounds, change of shift/provider, patient transport).  Anticipate needs; perform regular intentional rounding to assess need for position change, pain assessment, personal needs (e.g., toileting) and placement of necessary items.  Provide reorientation, appropriate sensory stimulation and routines with changes in mental status to decrease risk of fall.  Promote use of personal vision and auditory aids (e.g., glasses, hearing aids).  Assess assistance level required for safe and effective care; provide support as needed (e.g., toileting, bathing, mobilization).  Define behavior and activity limits to patient and family.  If fall occurs, assess for and treat injury; determine cause; revise fall injury prevention plan.  Regularly review medication contribution to fall risk; adjust medication administration times to minimize risk of falling.  Consider risk related to polypharmacy and age.  Balance adequate pain management with potential for oversedation.  Recent Flowsheet Documentation  Taken 6/24/2021 0730 by Cielo Warren LPN  Medication Review/Management: medications reviewed  Intervention: Promote Injury-Free Environment  Description: Provide a safe, barrier-free environment that encourages independent activity.  Keep care area uncluttered and well-lighted.  Determine need for increased observation or auditory alerts (e.g., bed or chair alarm).  Assess equipment and environmental modification needs (e.g., low bed, signage, nonskid footwear, grab bars).  Avoid use of restraints.  Recent Flowsheet Documentation  Taken 6/24/2021 1530 by Cielo Warren LPN  Safety  Promotion/Fall Prevention:   safety round/check completed   nonskid shoes/slippers when out of bed   fall prevention program maintained  Taken 6/24/2021 1330 by Cielo Warren LPN  Safety Promotion/Fall Prevention:   safety round/check completed   nonskid shoes/slippers when out of bed   fall prevention program maintained  Taken 6/24/2021 1130 by Cielo Warren LPN  Safety Promotion/Fall Prevention:   safety round/check completed   nonskid shoes/slippers when out of bed   fall prevention program maintained  Taken 6/24/2021 0930 by Cielo Warren LPN  Safety Promotion/Fall Prevention:   safety round/check completed   nonskid shoes/slippers when out of bed   fall prevention program maintained  Taken 6/24/2021 0730 by Cielo Warren LPN  Safety Promotion/Fall Prevention:   safety round/check completed   nonskid shoes/slippers when out of bed   fall prevention program maintained   lighting adjusted   gait belt   Goal Outcome Evaluation:            Patient turned q2 hours for skin protPatient observed with eyes open in sitting position in chair. Rise and fall of chest observed. Dressings clean dry and intact. ection. Hourly rounding completed this shift, no complaints or needs voiced.  Patient observed with eyes open in sitting position in chair. Rise and fall of chest observed. Dressings clean dry and intact.

## 2021-06-24 NOTE — CASE MANAGEMENT/SOCIAL WORK
Continued Stay Note  NELLI Villagran     Patient Name: Jim Montaño  MRN: [id number]  Today's Date: 6/24/2021    Admit Date: 6/19/2021    Discharge Plan     Row Name 06/24/21 1649       Plan    Plan  DC Plan:[facility] accepted, no precert required, [facility] approved. Bed available 6/25.    Plan Comments  current with RAJAN MOORE.        Chart review only             Bonnie Reynolds RN

## 2021-06-24 NOTE — PROGRESS NOTES
Referring Provider: Dr. Tapia    Reason for follow-up: Shortness of breath     Patient Care Team:  John Burleson MD as PCP - General  John Burleson MD as PCP - Family Medicine    Subjective .  He still has shortness of breath    Objective  Lying in bed comfortably     Review of Systems   Constitutional: Negative for fever and malaise/fatigue.   Cardiovascular: Negative for chest pain, dyspnea on exertion and palpitations.   Respiratory: Negative for cough and shortness of breath.    Skin: Negative for rash.   Gastrointestinal: Negative for abdominal pain, nausea and vomiting.   Neurological: Negative for focal weakness and headaches.   All other systems reviewed and are negative.      Patient has no known allergies.    Scheduled Meds:aspirin, 81 mg, Oral, Daily  atorvastatin, 10 mg, Oral, Nightly  bisoprolol, 5 mg, Oral, Daily  enoxaparin, 1 mg/kg, Subcutaneous, Q24H  escitalopram, 10 mg, Oral, Daily  finasteride, 5 mg, Oral, Daily  furosemide, 40 mg, Oral, Daily  levETIRAcetam XR, 2,000 mg, Oral, Daily  pantoprazole, 40 mg, Oral, Q AM  potassium chloride, 40 mEq, Oral, Daily  sodium chloride, 3 mL, Intravenous, Q12H      Continuous Infusions:Pharmacy to Dose enoxaparin (LOVENOX),       PRN Meds:.•  acetaminophen  •  bisacodyl  •  calcium carbonate  •  dextrose  •  docusate sodium  •  glucagon (human recombinant)  •  labetalol  •  melatonin  •  ondansetron  •  Pharmacy to Dose enoxaparin (LOVENOX)  •  [COMPLETED] Insert peripheral IV **AND** sodium chloride  •  sodium chloride  •  sodium chloride        VITAL SIGNS  Vitals:    06/23/21 2005 06/24/21 0030 06/24/21 0350 06/24/21 1142   BP: 127/85 106/72 110/77 111/63   BP Location: Right arm Right arm Left arm Left arm   Patient Position: Lying Lying Lying Sitting   Pulse: 73 70 66 88   Resp: 25 23 20 20   Temp: 97.4 °F (36.3 °C) 97.6 °F (36.4 °C) 96.8 °F (36 °C) 97.4 °F (36.3 °C)   TempSrc: Oral Oral Axillary Oral   SpO2: 97% 97% 100% 94%   Weight:   92 kg  "(202 lb 12.8 oz)    Height:           Flowsheet Rows      First Filed Value   Admission Height  180.3 cm (71\") Documented at 06/19/2021 2135   Admission Weight  85.3 kg (188 lb) Documented at 06/19/2021 2135           TELEMETRY: Atrial fibrillation    Physical Exam:  Constitutional:       Appearance: Well-developed.   Eyes:      General: No scleral icterus.     Conjunctiva/sclera: Conjunctivae normal.   HENT:      Head: Normocephalic and atraumatic.   Neck:      Vascular: No carotid bruit or JVD.   Pulmonary:      Effort: Pulmonary effort is normal.      Breath sounds: Normal breath sounds. No wheezing. No rales.   Cardiovascular:      Normal rate. Regular rhythm.      Murmurs: There is a systolic murmur.   Pulses:     Intact distal pulses.   Abdominal:      General: Bowel sounds are normal.      Palpations: Abdomen is soft.   Musculoskeletal:      Cervical back: Normal range of motion and neck supple. Skin:     General: Skin is warm and dry.      Findings: No rash.   Neurological:      Mental Status: Alert.          Results Review:   I reviewed the patient's new clinical results.  Lab Results (last 24 hours)     Procedure Component Value Units Date/Time    POC Glucose Once [[id number]]  (Abnormal) Collected: 06/24/21 1145    Specimen: Blood Updated: 06/24/21 1146     Glucose 170 mg/dL      Comment: Serial Number: [id number]:  [id number]       POC Glucose Once [[id number]]  (Abnormal) Collected: 06/24/21 0811    Specimen: Blood Updated: 06/24/21 0812     Glucose 135 mg/dL      Comment: Serial Number: [id number]:  [id number]       Basic Metabolic Panel [[id number]]  (Abnormal) Collected: 06/24/21 0224    Specimen: Blood Updated: 06/24/21 0323     Glucose 149 mg/dL      BUN 42 mg/dL      Creatinine 2.43 mg/dL      Sodium 134 mmol/L      Potassium 4.7 mmol/L      Chloride 105 mmol/L      CO2 17.0 mmol/L      Calcium 8.2 mg/dL      eGFR Non African Amer 25 mL/min/1.73      BUN/Creatinine Ratio 17.3     Anion " Gap 12.0 mmol/L     Narrative:      GFR Normal >60  Chronic Kidney Disease <60  Kidney Failure <15      CBC & Differential [[id number]]  (Abnormal) Collected: 06/24/21 0224    Specimen: Blood Updated: 06/24/21 0258    Narrative:      The following orders were created for panel order CBC & Differential.  Procedure                               Abnormality         Status                     ---------                               -----------         ------                     Scan Slide[[id number]]                                                                  CBC Auto Differential[[id number]]        Abnormal            Final result                 Please view results for these tests on the individual orders.    CBC Auto Differential [[id number]]  (Abnormal) Collected: 06/24/21 0224    Specimen: Blood Updated: 06/24/21 0258     WBC 6.40 10*3/mm3      RBC 5.00 10*6/mm3      Hemoglobin 10.7 g/dL      Hematocrit 35.8 %      MCV 71.6 fL      MCH 21.5 pg      MCHC 30.0 g/dL      RDW 22.8 %      RDW-SD 58.2 fl      MPV 8.2 fL      Platelets 209 10*3/mm3      Neutrophil % 74.0 %      Lymphocyte % 12.8 %      Monocyte % 9.1 %      Eosinophil % 3.2 %      Basophil % 0.9 %      Neutrophils, Absolute 4.70 10*3/mm3      Lymphocytes, Absolute 0.80 10*3/mm3      Monocytes, Absolute 0.60 10*3/mm3      Eosinophils, Absolute 0.20 10*3/mm3      Basophils, Absolute 0.10 10*3/mm3      nRBC 0.1 /100 WBC     Basic Metabolic Panel [[id number]]  (Abnormal) Collected: 06/24/21 0006    Specimen: Blood Updated: 06/24/21 0050     Glucose 152 mg/dL      BUN 43 mg/dL      Creatinine 2.64 mg/dL      Sodium 134 mmol/L      Potassium 4.6 mmol/L      Chloride 105 mmol/L      CO2 17.0 mmol/L      Calcium 8.4 mg/dL      eGFR Non African Amer 23 mL/min/1.73      BUN/Creatinine Ratio 16.3     Anion Gap 12.0 mmol/L     Narrative:      GFR Normal >60  Chronic Kidney Disease <60  Kidney Failure <15      POC Glucose Once [[id number]]  (Abnormal) Collected:  06/23/21 1949    Specimen: Blood Updated: 06/23/21 1950     Glucose 200 mg/dL      Comment: Serial Number: [id number]:  [id number]             Imaging Results (Last 24 Hours)     ** No results found for the last 24 hours. **          EKG      I personally viewed and interpreted the patient's EKG/Telemetry data:    ECHOCARDIOGRAM:    STRESS MYOVIEW:    CARDIAC CATHETERIZATION:    OTHER:         Assessment/Plan     Active Problems:    Hypoglycemia  Congestive heart failure  Elevated troponin  Atrial fibrillation  Hypertension  Chronic insufficiency  Anemia    Noted with shortness of breath and had echocardiogram which showed biventricular failure and his LV ejection fraction about 20 to 25%  Patient also had elevated troponin which could be secondary to atrial fibrillation or demand ischemia with renal insufficiency  Patient also has chronic renal insufficiency and is followed by nephrologist  Patient is currently on beta-blockers and diuretics only  Patient is not on any ACE inhibitor because of renal insufficiency but if his blood pressure permits we will place him on hydralazine.  Since his blood pressure still on the lower side we will continue with beta-blockers and follow him as an outpatient    I discussed the patients findings and my recommendations with patient and nurse    Geovanny Gomez MD  06/24/21  15:24 EDT

## 2021-06-24 NOTE — THERAPY TREATMENT NOTE
Subjective: Pt agreeable to therapeutic plan of care.    Objective:     Bed mobility - Mod-A, pt. Struggling to awaken, appeared confused at times, decreased motor planning.   Transfers - Min-A and with rolling walker c bed elevated.   Ambulation - 3 feet Min-A, Assist x 2 and with rolling walker, slowed response to instructions, requiring great amount of time to progress.    Pain: 0 VAS  Education: Provided education on importance of mobility and skilled verbal / tactile cueing throughout intervention.     Assessment: Jim Montaño presents with functional mobility impairments which indicate the need for skilled intervention. Pt. Is high falls risk, delayed processing, weakness present, low activity tolerance. Will continue to follow and progress as tolerated.     Plan/Recommendations:   Pt would benefit from Inpatient Rehabilitation placement at discharge from facility and requires no DME at discharge.   Pt desires Inpatient Rehabilitation placement at discharge. Pt cooperative; agreeable to therapeutic recommendations and plan of care.     Basic Mobility 6-click:  Rollin = Total, A lot = 2, A little = 3; 4 = None  Supine>Sit:   1 = Total, A lot = 2, A little = 3; 4 = None   Sit>Stand with arms:  1 = Total, A lot = 2, A little = 3; 4 = None  Bed>Chair:   1 = Total, A lot = 2, A little = 3; 4 = None  Ambulate in room:  1 = Total, A lot = 2, A little = 3; 4 = None  3-5 Steps with railin = Total, A lot = 2, A little = 3; 4 = None  Score: 12    Modified Hilario: 4 = Moderately severe disability (Unable to attend to own bodily needs without assistance, and unable to walk unassisted)     Post-Tx Position: Up in Chair, Staff Present, Alarms activated and Call light and personal items within reach  PPE: gloves, surgical mask, eyewear protection

## 2021-06-24 NOTE — PROGRESS NOTES
LOS: 4 days   Patient Care Team:  John Burleson MD as PCP - General  John Burleson MD as PCP - Family Medicine    Subjective:  Still not at baseline    Objective:   afebrile      Review of Systems:   Review of Systems   Unable to perform ROS: Mental status change       Vital Signs  Temp:  [96.8 °F (36 °C)-97.6 °F (36.4 °C)] 96.8 °F (36 °C)  Heart Rate:  [66-77] 66  Resp:  [16-25] 20  BP: (106-127)/(72-85) 110/77    Physical Exam:  Physical Exam  Vitals and nursing note reviewed.   Constitutional:       Appearance: Normal appearance.   HENT:      Head: Normocephalic.   Cardiovascular:      Rate and Rhythm: Rhythm irregular.      Pulses: Normal pulses.   Pulmonary:      Breath sounds: Normal breath sounds.   Skin:     General: Skin is warm.   Neurological:      General: No focal deficit present.      Mental Status: He is oriented to person, place, and time.          Radiology:  CT Head Without Contrast    Result Date: 6/20/2021  1. No acute intracranial abnormality. 2. Moderate volume loss and chronic microvascular ischemic changes. 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 Electronically signed by:  Jovanni Alcazar M.D.  6/20/2021 12:45 AM         Results Review:     I reviewed the patient's new clinical results.  I reviewed the patient's new imaging results and agree with the interpretation.    Medication Review:   Scheduled Meds:aspirin, 81 mg, Oral, Daily  atorvastatin, 10 mg, Oral, Nightly  bisoprolol, 5 mg, Oral, Daily  enoxaparin, 1 mg/kg, Subcutaneous, Q24H  escitalopram, 10 mg, Oral, Daily  finasteride, 5 mg, Oral, Daily  furosemide, 40 mg, Oral, Daily  levETIRAcetam XR, 2,000 mg, Oral, Daily  pantoprazole, 40 mg, Oral, Q AM  potassium chloride, 40 mEq, Oral, Daily  sodium chloride, 3 mL, Intravenous, Q12H      Continuous Infusions:Pharmacy to Dose enoxaparin (LOVENOX),       PRN Meds:.•  acetaminophen  •  dextrose  •  glucagon (human recombinant)  •  ondansetron  •  Pharmacy to Dose  enoxaparin (LOVENOX)  •  [COMPLETED] Insert peripheral IV **AND** sodium chloride  •  sodium chloride  •  sodium chloride    Labs:    CBC    Results from last 7 days   Lab Units 06/24/21 0224 06/23/21  0446 06/22/21  0344 06/21/21  0614 06/19/21  2244   WBC 10*3/mm3 6.40 6.10 6.20 6.40 6.80   HEMOGLOBIN g/dL 10.7* 10.9* 10.6* 10.5* 10.3*   PLATELETS 10*3/mm3 209 216 262 277 237     BMP   Results from last 7 days   Lab Units 06/24/21 0224 06/24/21  0006 06/23/21 0446 06/22/21  2337 06/22/21  0344 06/21/21 0614 06/21/21  0110 06/19/21  2244   SODIUM mmol/L 134* 134* 136 137 135* 135*  --  138   POTASSIUM mmol/L 4.7 4.6 4.7 4.6 5.4* 5.1  --  3.6   CHLORIDE mmol/L 105 105 105 106 105 105  --  108*   CO2 mmol/L 17.0* 17.0* 18.0* 20.0* 18.0* 18.0*  --  19.0*   BUN mg/dL 42* 43* 42* 43* 40* 35*  --  33*   CREATININE mg/dL 2.43* 2.64* 2.52* 2.78* 2.46* 2.20*  --  1.83*   GLUCOSE mg/dL 149* 152* 142* 157* 140* 107*  --  58*   MAGNESIUM mg/dL  --   --   --   --   --   --  2.1  --      Cr Clearance Estimated Creatinine Clearance: 21.6 mL/min (A) (by C-G formula based on SCr of 2.43 mg/dL (H)).  Coag   Results from last 7 days   Lab Units 06/21/21 0614   INR  1.32*     HbA1C   Lab Results   Component Value Date    HGBA1C 7.1 (H) 05/04/2021    HGBA1C 8.77 (H) 03/13/2021    HGBA1C 11.9 (H) 08/25/2020     Blood Glucose   Glucose   Date/Time Value Ref Range Status   06/24/2021 0811 135 (H) 70 - 105 mg/dL Final     Comment:     Serial Number: [id number]:  [id number]   06/23/2021 1949 200 (H) 70 - 105 mg/dL Final     Comment:     Serial Number: [id number]:  [id number]   06/23/2021 1617 227 (H) 70 - 105 mg/dL Final     Comment:     Serial Number: [id number]:  [id number]   06/23/2021 1136 165 (H) 70 - 105 mg/dL Final     Comment:     Serial Number: [id number]:  [id number]   06/23/2021 0729 135 (H) 70 - 105 mg/dL Final     Comment:     Serial Number: [id number]:  [id number]   06/22/2021 1941 145 (H) 70  - 105 mg/dL Final     Comment:     Serial Number: [id number]:  [id number]   06/22/2021 1654 181 (H) 70 - 105 mg/dL Final     Comment:     Serial Number: [id number]:  [id number]   06/22/2021 1314 185 (H) 70 - 105 mg/dL Final     Comment:     Serial Number: [id number]:  [id number]     Infection     CMP   Results from last 7 days   Lab Units 06/24/21  0224 06/24/21  0006 06/23/21  0446 06/22/21  2337 06/22/21  0344 06/21/21  0614 06/19/21  2244   SODIUM mmol/L 134* 134* 136 137 135* 135* 138   POTASSIUM mmol/L 4.7 4.6 4.7 4.6 5.4* 5.1 3.6   CHLORIDE mmol/L 105 105 105 106 105 105 108*   CO2 mmol/L 17.0* 17.0* 18.0* 20.0* 18.0* 18.0* 19.0*   BUN mg/dL 42* 43* 42* 43* 40* 35* 33*   CREATININE mg/dL 2.43* 2.64* 2.52* 2.78* 2.46* 2.20* 1.83*   GLUCOSE mg/dL 149* 152* 142* 157* 140* 107* 58*   ALBUMIN g/dL  --   --   --   --   --   --  3.20*   BILIRUBIN mg/dL  --   --   --   --   --   --  0.7   ALK PHOS U/L  --   --   --   --   --   --  66   AST (SGOT) U/L  --   --   --   --   --   --  15   ALT (SGPT) U/L  --   --   --   --   --   --  10     UA    Results from last 7 days   Lab Units 06/20/21  0041   NITRITE UA  Negative   WBC UA /HPF 0-2*   BACTERIA UA /HPF None Seen   SQUAM EPITHEL UA /HPF 0-2     Radiology(recent) No radiology results for the last day   Assessment:    Acute Hypoglycemia  Acute troponin elevations secondary to on demand ischemia  ACD  NITIN  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  Chronic systolic congestive heart failure / CHFrEF 27%  Cerebrovascular disease status post CVA  Seizure disorder  Diabetic dyslipidemia  Chronic kidney disease stage IIIa secondary to hypertensive nephropathy and DGS  History of colon cancer  History of right hemicolectomy  Atrial fibrillation, chronic  Hypercoagulable state secondary to atrial fibrillation  Gastroesophageal reflux  disease without gastritis  Degenerative disc disease lumbosacral spine  Degenerative joint disease  Vitamin B12 deficiency  Chronic insulin use  BPH with LUTS  Chronic pancreatitis  Coronary artery calcifications  LVH  Pulmonary hypertension  Hypertension associated with chronic kidney disease stage IIIa  Atherosclerotic heart disease of native coronary arteries with angina pectoris    Plan:  Continue supportive care//I remain concerned about his prognosis secondary to his age and comorbidities//ready for discharge when arrangements with subacute rehabilitation have been made        John Burleson MD  06/24/21  08:47 EDT

## 2021-06-24 NOTE — PLAN OF CARE
Goal Outcome Evaluation:      Patient no issues noted this shift.  Will continue to monitor.

## 2021-06-24 NOTE — DISCHARGE PLACEMENT REQUEST
"Jim Gomez (89 y.o. Male)     Date of Birth Social Security Number Address Home Phone MRN    12/18/1926  [address] [facility] [address] [phone] [id number]    Buddhist Marital Status          Faith        Admission Date Admission Type Admitting Provider Attending Provider Department, Room/Bed    6/19/21 Emergency John Burleson MD Heimer, Brian T, MD [facility] 2B MEDICAL INPATIENT, 225/1    Discharge Date Discharge Disposition Discharge Destination                       Attending Provider: John Burleson MD    Allergies: No Known Allergies    Isolation: None   Infection: None   Code Status: CPR    Ht: 180.3 cm (70.98\")   Wt: 92 kg (202 lb 12.8 oz)    Admission Cmt: None   Principal Problem: None                Active Insurance as of 6/19/2021     Primary Coverage     Payor Plan Insurance Group Employer/Plan Group    MEDICARE MEDICARE A & B      Payor Plan Address Payor Plan Phone Number Payor Plan Fax Number Effective Dates    [address] [phone]  12/1/1991 - None Entered    [place] [address]       Subscriber Name Subscriber Birth Date Member ID       JIM GOMEZ 12/18/1926 [id number]                 Emergency Contacts      (Rel.) Home Phone Work Phone Mobile Phone    SANDIP GOMEZ (Son) -- -- [phone]    JAYME CRAWFORD (Daughter) -- -- [phone]            Insurance Information                MEDICARE/MEDICARE A & B Phone: [phone]    Subscriber: Jim Gomez Subscriber#: [id number]    Group#:  Precert#:           "

## 2021-06-25 PROBLEM — E16.2 HYPOGLYCEMIA: Status: RESOLVED | Noted: 2021-01-01 | Resolved: 2021-01-01

## 2021-06-25 NOTE — CASE MANAGEMENT/SOCIAL WORK
Case Management Discharge Note      Final Note: [facility]         Selected Continued Care - Discharged on 6/25/2021 Admission date: 6/19/2021 - Discharge disposition: Rehab Facility or Unit (DC - External)    Destination Coordination complete    Service Provider Selected Services Address Phone Fax Patient Preferred    [facility] [place], operated by [facility]  [address] [place] [address] [phone] --                    Final Discharge Disposition Code: 03 - skilled nursing facility (SNF)

## 2021-06-25 NOTE — PLAN OF CARE
Goal Outcome Evaluation:         Patient continues with 02 and heart monitor.  No complaints voiced this shift. Will continue to monitor.

## 2021-06-25 NOTE — DISCHARGE SUMMARY
Date of Discharge:  6/25/2021    Discharge Diagnosis: Hypoglycemia, elevated Troponin, SOA, lethargy, MS change  Chronic atrial fib  Anemia -  Chronic kidney disease stage IIIb     Presenting Problem/History of Present Illness  Active Hospital Problems    Diagnosis  POA   • Hypoglycemia [E16.2]  Yes      Resolved Hospital Problems   No resolved problems to display.      Hospital Course  Patient is a 89 y.o. male  Admitted for the above, medications were adjusted, he was given D50 and BS increased, cardiology saw pt and had ECHO performed, elevated troponin thought to be due to stress from hypoglycemia, demand, and ARF. No other cardiac workup performed. BS remained stable. PT recommended inpt rehab    Procedures Performed         Consults:   Consults     Date and Time Order Name Status Description    6/20/2021 10:40 AM Inpatient Cardiology Consult Completed     6/20/2021  1:21 AM Hospitalist (on-call MD unless specified) Completed           Pertinent Test Results:     Condition on Discharge:  Fair    Vital Signs  Temp:  [96.4 °F (35.8 °C)-97.4 °F (36.3 °C)] 97 °F (36.1 °C)  Heart Rate:  [70-88] 79  Resp:  [20] 20  BP: ()/(52-72) 98/52    Physical Exam:     General Appearance:    Alert, cooperative, in no acute distress   Head:    Normocephalic, without obvious abnormality, atraumatic   Eyes:            Lids and lashes normal, conjunctivae and sclerae normal, no   icterus, no pallor   Ears:    Ears appear intact with no abnormalities noted   Throat:   No oral lesions, no thrush, oral mucosa moist   Neck:   No adenopathy, supple, trachea midline, no thyromegaly,   Back:     No kyphosis present, no scoliosis present, no skin lesions,      erythema or scars, no tenderness to percussion or                   palpation,   range of motion normal   Lungs:     Clear to auscultation,respirations regular, even and                  unlabored    Heart:    Regular rhythm and normal rate, normal S1 and S2, no             murmur, no gallop, no rub, no click   Chest Wall:    No abnormalities observed   Abdomen:     Normal bowel sounds, no masses, no organomegaly, soft        non-tender, non-distended, no guarding, no rebound                tenderness   Rectal:     Deferred   Extremities:   Moves all extremities well, no edema, no cyanosis, no             redness   Pulses:   Pulses palpable and equal bilaterally   Skin:   No bleeding, bruising or rash   Lymph nodes:   No palpable adenopathy   Neurologic:   Cranial nerves 2 - 12 grossly intact, sensation intact,       Discharge Disposition      Discharge Medications     Discharge Medications      ASK your doctor about these medications      Instructions Start Date   Aspir-Low 81 MG EC tablet  Generic drug: aspirin   81 mg, Oral, Daily      BASAGLAR KWIKPEN SC   45 Units, Subcutaneous, Nightly      bisoprolol 5 MG tablet  Commonly known as: ZEBeta   5 mg, Oral, Daily      escitalopram 10 MG tablet  Commonly known as: LEXAPRO   10 mg, Oral, Daily      famotidine 40 MG tablet  Commonly known as: PEPCID   40 mg, Oral, Daily      finasteride 5 MG tablet  Commonly known as: PROSCAR   5 mg, Oral, Daily      furosemide 20 MG tablet  Commonly known as: LASIX   40 mg, Oral, Daily      insulin aspart 100 UNIT/ML injection  Commonly known as: novoLOG   15 Units, Subcutaneous, 3 Times Daily Before Meals      levETIRAcetam  MG 24 hr tablet  Commonly known as: KEPPRA XR   2,000 mg, Oral, Daily      potassium chloride 20 MEQ CR tablet  Commonly known as: K-DUR,KLOR-CON   40 mEq, Oral, Daily      pravastatin 40 MG tablet  Commonly known as: PRAVACHOL   40 mg, Oral, Daily      vitamin D3 125 MCG (5000 UT) capsule capsule   5,000 Units, Oral, Daily             Discharge Diet: ADA    Activity at Discharge: Up with assist    Follow-up Appointments  No future appointments.  Additional Instructions for the Follow-ups that You Need to Schedule     Ambulatory Referral to Home Health   As directed       Face to Face Visit Date: 6/24/2021    Follow-up provider for Plan of Care?: I will be treating the patient on an ongoing basis.  Please send me the Plan of Care for signature.    Follow-up provider: SHANTHI OSEGUERA [[id number]]    Reason/Clinical Findings: CHF, afib    Describe mobility limitations that make leaving home difficult: weakness    Nursing/Therapeutic Services Requested: Skilled Nursing (HHC resume services) Other    Skilled nursing orders: Other (HHC resume services)    Frequency: 1 Week 1               Test Results Pending at Discharge       Courtney Alonso, MAREN  06/25/21  09:19 EDT

## 2021-06-25 NOTE — CASE MANAGEMENT/SOCIAL WORK
Discharge Planning Assessment   Tyshawn     Patient Name: Jim Montaño  MRN: [id number]  Today's Date: 6/25/2021    Admit Date: 6/19/2021      Discharge Plan     Row Name 06/25/21 1315       Plan    Plan  D/C Plan: [facility] accepted. No precert required. PASRR approved. Bed available 6/25.    Patient/Family in Agreement with Plan  yes    Plan Comments  CM spoke to patient's son, Frantz, on phone and [facility] letter reviewed, verbal consent obtained and declined copy. Son states he can provide transport to rehab if needed. CM updated bedside RN.          Expected Discharge Date and Time     Expected Discharge Date Expected Discharge Time    Jun 25, 2021         Patient Forms     Row Name 06/25/21 1316       Patient Forms    Important Message from Medicare (IMM)  Delivered 6/25/21    Delivered to  Support person    Method of delivery  Telephone            uDlce Fatima

## 2021-06-25 NOTE — THERAPY TREATMENT NOTE
Subjective: Pt agreeable to therapeutic plan of care.    Objective:     Bed mobility - Mod-A  Transfers - Min-A and with rolling walker  Ambulation - 3 feet for sps Min-A, Assist x 2 and with rolling walker, pt. Guarded, slow to respond, decreased motor planning, high falls risk.    Pain: 0 VAS  Education: Provided education on importance of mobility and skilled verbal / tactile cueing throughout intervention.     Assessment: Jim Montaño presents with functional mobility impairments which indicate the need for skilled intervention. AO x 2, delayed processing, requiring vcs to stay awake for bed mobility and transfers. High falls risk, low activity tolerance.Tolerating session today without incident. Will continue to follow and progress as tolerated.     Plan/Recommendations:   Pt would benefit from Inpatient Rehabilitation placement at discharge from facility and requires no DME at discharge.   Pt desires Inpatient Rehabilitation placement at discharge. Pt cooperative; agreeable to therapeutic recommendations and plan of care.     Basic Mobility 6-click:  Rollin = Total, A lot = 2, A little = 3; 4 = None  Supine>Sit:   1 = Total, A lot = 2, A little = 3; 4 = None   Sit>Stand with arms:  1 = Total, A lot = 2, A little = 3; 4 = None  Bed>Chair:   1 = Total, A lot = 2, A little = 3; 4 = None  Ambulate in room:  1 = Total, A lot = 2, A little = 3; 4 = None  3-5 Steps with railin = Total, A lot = 2, A little = 3; 4 = None  Score: 12    Modified [place]: 4 = Moderately severe disability (Unable to attend to own bodily needs without assistance, and unable to walk unassisted)     Post-Tx Position: Up in Chair, Staff Present, Alarms activated and Call light and personal items within reach  PPE: gloves, surgical mask, eyewear protection

## 2021-06-25 NOTE — PLAN OF CARE
Problem: Fall Injury Risk  Goal: Absence of Fall and Fall-Related Injury  Outcome: Ongoing, Progressing  Intervention: Identify and Manage Contributors to Fall Injury Risk  Recent Flowsheet Documentation  Taken 6/25/2021 0927 by Gabriella Betancourt RN  Medication Review/Management: medications reviewed  Taken 6/25/2021 0720 by Gabriella Betancourt RN  Medication Review/Management: medications reviewed  Intervention: Promote Injury-Free Environment  Recent Flowsheet Documentation  Taken 6/25/2021 0927 by Gabrielal Betancourt RN  Safety Promotion/Fall Prevention:   safety round/check completed   nonskid shoes/slippers when out of bed   fall prevention program maintained  Taken 6/25/2021 0720 by Gabriella Betancourt RN  Safety Promotion/Fall Prevention:   safety round/check completed   nonskid shoes/slippers when out of bed   fall prevention program maintained     Problem: Adult Inpatient Plan of Care  Goal: Plan of Care Review  Outcome: Ongoing, Progressing  Flowsheets (Taken 6/25/2021 1058)  Plan of Care Reviewed With: patient  Goal: Patient-Specific Goal (Individualized)  Outcome: Ongoing, Progressing  Goal: Absence of Hospital-Acquired Illness or Injury  Outcome: Ongoing, Progressing  Intervention: Identify and Manage Fall Risk  Recent Flowsheet Documentation  Taken 6/25/2021 0927 by Gabriella Betancourt RN  Safety Promotion/Fall Prevention:   safety round/check completed   nonskid shoes/slippers when out of bed   fall prevention program maintained  Taken 6/25/2021 0720 by Gabriella Betancourt RN  Safety Promotion/Fall Prevention:   safety round/check completed   nonskid shoes/slippers when out of bed   fall prevention program maintained  Intervention: Prevent Skin Injury  Recent Flowsheet Documentation  Taken 6/25/2021 0720 by Gabriella Betancourt RN  Body Position: position maintained  Skin Protection: incontinence pads utilized  Intervention: Prevent Infection  Recent Flowsheet Documentation  Taken 6/25/2021 0927 by  Gabriella Betancourt RN  Infection Prevention:   single patient room provided   rest/sleep promoted   hand hygiene promoted  Taken 6/25/2021 0720 by Gabriella Betancourt RN  Infection Prevention:   single patient room provided   rest/sleep promoted   hand hygiene promoted  Goal: Optimal Comfort and Wellbeing  Outcome: Ongoing, Progressing  Intervention: Provide Person-Centered Care  Recent Flowsheet Documentation  Taken 6/25/2021 0720 by Gabriella Betancourt RN  Trust Relationship/Rapport:   care explained   thoughts/feelings acknowledged  Goal: Readiness for Transition of Care  Outcome: Ongoing, Progressing     Problem: Skin Injury Risk Increased  Goal: Skin Health and Integrity  Outcome: Ongoing, Progressing  Intervention: Optimize Skin Protection  Recent Flowsheet Documentation  Taken 6/25/2021 0720 by Gabriella Betancourt RN  Pressure Reduction Techniques: frequent weight shift encouraged  Head of Bed (HOB): HOB elevated  Pressure Reduction Devices: pressure-redistributing mattress utilized  Skin Protection: incontinence pads utilized     Problem: Diabetes Comorbidity  Goal: Blood Glucose Level Within Desired Range  Outcome: Ongoing, Progressing  Intervention: Maintain Glycemic Control  Recent Flowsheet Documentation  Taken 6/25/2021 0720 by Gabriella Betancourt RN  Glycemic Management: blood glucose monitoring     Problem: Hypertension Comorbidity  Goal: Blood Pressure in Desired Range  Outcome: Ongoing, Progressing  Intervention: Maintain Hypertension-Management Strategies  Recent Flowsheet Documentation  Taken 6/25/2021 0927 by Gabriella Betancourt RN  Medication Review/Management: medications reviewed  Taken 6/25/2021 0720 by Gabriella Betancourt RN  Medication Review/Management: medications reviewed   Goal Outcome Evaluation:  Plan of Care Reviewed With: patient         Patient rested throughout the shift. Patient worked with PT and sat up in the chair to eat breakfast. Patient is supposed to D/C to [facility]  today. Chest x-ray was performed this am. Will continue to observe.

## 2021-07-10 NOTE — PLAN OF CARE
Goal Outcome Evaluation:         Patient currently resting in bed with family at bedside. Patient has had a few small loose bloody stools since being on the unit. Patient unable to verbalize if he's in pain, occasionally patient will grimace when turning from side to side. Patient added to turn team, currently resting in bed with call light in reach, will continue to monitor.

## 2021-07-10 NOTE — ED PROVIDER NOTES
Subjective   84-year-old male with dark red blood noticed by nursing home staff tonight.  Patient is drowsy and oriented to person only, has no complaints.          Review of Systems   Unable to perform ROS: Dementia       Past Medical History:   Diagnosis Date   • A-fib (CMS/HCC)    • Cancer (CMS/HCC)     colon   • Hypertension    • Type 2 diabetes mellitus (CMS/HCC)        No Known Allergies    Past Surgical History:   Procedure Laterality Date   • COLONOSCOPY N/A 3/14/2021    Procedure: COLONOSCOPY with endoscopic sclerotherapy and endoscopic clipping;  Surgeon: Goldy Healy MD;  Location: [facility] ENDOSCOPY;  Service: Gastroenterology;  Laterality: N/A;  post: diverticulosis, post surgical changes   • PROSTATE SURGERY         Family History   Problem Relation Age of Onset   • Cancer Father         multipule mylomia        Social History     Socioeconomic History   • Marital status:      Spouse name: Not on file   • Number of children: Not on file   • Years of education: Not on file   • Highest education level: Not on file   Tobacco Use   • Smoking status: Never Smoker   • Smokeless tobacco: Never Used   Substance and Sexual Activity   • Alcohol use: Not Currently   • Drug use: Never           Objective   Physical Exam  Constitutional:       Comments: Elderly male in no acute distress   HENT:      Head: Normocephalic and atraumatic.      Mouth/Throat:      Comments: Mildly dry mucous membranes, otherwise clear  Eyes:      Conjunctiva/sclera: Conjunctivae normal.      Pupils: Pupils are equal, round, and reactive to light.   Cardiovascular:      Rate and Rhythm: Normal rate and regular rhythm.      Heart sounds: Normal heart sounds.   Pulmonary:      Effort: Pulmonary effort is normal.      Breath sounds: Normal breath sounds.   Abdominal:      General: Bowel sounds are normal. There is no distension.      Palpations: Abdomen is soft.   Genitourinary:     Comments: Small amount of dark red blood  noticed on rectal exam  Musculoskeletal:      Comments: 2+ symmetric pitting edema, nontender   Skin:     General: Skin is warm and dry.      Capillary Refill: Capillary refill takes less than 2 seconds.   Neurological:      Comments: Drowsy, oriented to person only, airway intact, moves all extremities, no facial asymmetry appreciated   Psychiatric:         Mood and Affect: Mood normal.         Behavior: Behavior normal.         Procedures           ED Course  ED Course as of Jul 10 0608   Sat Jul 10, 2021   0606 EKG interpretation: Atrial fibrillation, rate 76, ST depression T wave inversion, likely strain    [JR]      ED Course User Index  [JR] Ahmet Hernandez MD                                           [facility]  Number of Diagnoses or Management Options  Gastrointestinal hemorrhage, unspecified gastrointestinal hemorrhage type  Pleural effusion on right  Diagnosis management comments: Results for orders placed or performed during the hospital encounter of 07/10/21  -Comprehensive Metabolic Panel  Specimen: Blood       Result                      Value             Ref Range           Glucose                     114 (H)           65 - 99 mg/dL       BUN                         42 (H)            8 - 23 mg/dL        Creatinine                  2.05 (H)          0.76 - 1.27 *       Sodium                      138               136 - 145 mm*       Potassium                   4.9               3.5 - 5.2 mm*       Chloride                    107               98 - 107 mmo*       CO2                         21.0 (L)          22.0 - 29.0 *       Calcium                     8.3               8.2 - 9.6 mg*       Total Protein               6.7               6.0 - 8.5 g/*       Albumin                     3.20 (L)          3.50 - 5.20 *       ALT (SGPT)                  13                1 - 41 U/L          AST (SGOT)                  17                1 - 40 U/L          Alkaline Phosphatase        77                39 - 117  U/L        Total Bilirubin             0.7               0.0 - 1.2 mg*       eGFR Non  Amer       30 (L)            >60 mL/min/1*       Globulin                    3.5               gm/dL               A/G Ratio                   0.9               g/dL                BUN/Creatinine Ratio        20.5              7.0 - 25.0          Anion Gap                   10.0              5.0 - 15.0 m*  -Protime-INR  Specimen: Blood       Result                      Value             Ref Range           Protime                     14.0 (H)          9.6 - 11.7 S*       INR                         1.28 (H)          0.93 - 1.10    -aPTT  Specimen: Blood       Result                      Value             Ref Range           PTT                         30.0              24.0 - 31.0 *  -CBC Auto Differential  Specimen: Blood       Result                      Value             Ref Range           WBC                         5.80              3.40 - 10.80*       RBC                         4.81              4.14 - 5.80 *       Hemoglobin                  10.4 (L)          13.0 - 17.7 *       Hematocrit                  35.1 (L)          37.5 - 51.0 %       MCV                         73.0 (L)          79.0 - 97.0 *       MCH                         21.6 (L)          26.6 - 33.0 *       MCHC                        29.6 (L)          31.5 - 35.7 *       RDW                         25.4 (H)          12.3 - 15.4 %       RDW-SD                      66.1 (H)          37.0 - 54.0 *       MPV                         9.9               6.0 - 12.0 fL       Platelets                   229               140 - 450 10*       Neutrophil %                71.0              42.7 - 76.0 %       Lymphocyte %                13.8 (L)          19.6 - 45.3 %       Monocyte %                  10.4              5.0 - 12.0 %        Eosinophil %                4.4               0.3 - 6.2 %         Basophil %                  0.4               0.0 - 1.5 %          Neutrophils, Absolute       4.10              1.70 - 7.00 *       Lymphocytes, Absolute       0.80              0.70 - 3.10 *       Monocytes, Absolute         0.60              0.10 - 0.90 *       Eosinophils, Absolute       0.30              0.00 - 0.40 *       Basophils, Absolute         0.00              0.00 - 0.20 *       nRBC                        0.3 (H)           0.0 - 0.2 /1*  -Troponin  Specimen: Blood       Result                      Value             Ref Range           Troponin T                  0.086 (C)         0.000 - 0.03*  -BNP  Specimen: Blood       Result                      Value             Ref Range           proBNP                      18,999.0 (H)      0.0-1,800.0 *  -Procalcitonin  Specimen: Blood       Result                      Value             Ref Range           Procalcitonin               0.09              0.00 - 0.25 *  -ECG 12 Lead       Result                      Value             Ref Range           QT Interval                 444               ms             -Type & Screen  Specimen: Blood       Result                      Value             Ref Range           ABO Type                    A                                     RH type                     Positive                              Antibody Screen             Negative                              T&S Expiration Date                                           7/13/2021 11:59:59 PM    CT Abdomen Pelvis Without Contrast   Final Result    Impression:     1.  When compared to the prior study, interval development of a large right pleural effusion with adjacent atelectasis. There is a moderate left pleural effusion. Findings may be secondary to an infectious process.        2.  Small ascites, predominantly in the subhepatic region.        3.  Diverticulosis without evidence of diverticulitis.        4.  Hepatic and renal cysts.        5.  Changes from chronic pancreatitis.        Electronically signed by:  Anamaria  Pola      7/10/2021 2:29 AM     XR Chest 1 View    (Results Pending)    Per nursing home, patient at baseline, hemodynamically stable here in the ED but does require 2 L of oxygen to maintain saturation.  Patient was admitted in June for hypoglycemia and found to have a right pleural effusion at that time, seen by cardiology.  This does appear to be worse but at this time does not appear to be pneumonia.  Given rectal bleeding, will observe, Dr. Oseguera accepts.       Amount and/or Complexity of Data Reviewed  Clinical lab tests: ordered and reviewed  Tests in the radiology section of CPT®: ordered and reviewed  Tests in the medicine section of CPT®: ordered and reviewed  Decide to obtain previous medical records or to obtain history from someone other than the patient: yes        Final diagnoses:   Gastrointestinal hemorrhage, unspecified gastrointestinal hemorrhage type   Pleural effusion on right       ED Disposition  ED Disposition     ED Disposition Condition Comment    Decision to Admit  Level of Care: Telemetry [5]   Diagnosis: Gastrointestinal hemorrhage, unspecified gastrointestinal hemorrhage type [[id number]]   Admitting Physician: SHANTHI OSEGUERA [[id number]]            No follow-up provider specified.       Medication List      No changes were made to your prescriptions during this visit.          Ahmet Hernandez MD  07/10/21 0608

## 2021-07-10 NOTE — CONSULTS
Nephrology Consult Note                                                [facility]      Patient Identification:  Name: Jim Montaño  Age: 89 y.o.  Sex: male  :  1926  MRN: [id number]               Requesting Physician: John Burleson MD  Reason for Consultation: management recommendations      History of Present Illness:    Patient is a 89-year-old white male patient who is not known to have previous kidney disease with a history of diabetes CVA seizure disorder hypertension mixed hyperlipidemia GERD atrial fibrillation benign prostate hypertrophy presented to the hospital with GI bleed was found to have elevated creatinine with acute kidney injury prompting renal consultation  Problem List:  Patient Active Problem List   Diagnosis   • Type 2 diabetes mellitus with hyperglycemia, with long-term current use of insulin (CMS/HCC)   • Temporary cerebral vascular dysfunction   • Cerebrovascular accident (CVA) (CMS/HCC)   • Seizure disorder (CMS/HCC)   • Essential hypertension   • Mixed hyperlipidemia   • Gastroesophageal reflux disease   • Cough   • Colon cancer (CMS/HCC)   • Body mass index (BMI) of 29.0-29.9 in adult   • Atrial fibrillation (CMS/HCC)   • Adenocarcinoma of cecum (CMS/HCC)   • Hypokalemia   • Hemorrhoids   • Hemorrhage of rectum and anus   • Fatigue   • Upper respiratory infection   • Congestive heart failure (CMS/HCC)   • Chronic renal insufficiency, stage III (moderate) (CMS/HCC)   • Bradycardia   • Blood in urine   • Benign prostatic hyperplasia   • Hyperglycemia   • Stage 3 chronic kidney disease (CMS/HCC)   • Gastrointestinal hemorrhage     Past Medical History:  Past Medical History:   Diagnosis Date   • A-fib (CMS/HCC)    • BPH (benign prostatic hyperplasia)    • Cancer (CMS/HCC)     colon   • Cardiomegaly    • CHF (congestive heart failure) (CMS/HCC)    • CKD (chronic kidney disease) stage 3, GFR 30-59 ml/min (CMS/HCC)    • Coronary artery disease    •  Epilepsy (CMS/HCC)    • GERD (gastroesophageal reflux disease)    • Hyperlipidemia    • Hypertension    • OA (osteoarthritis)    • Pulmonary HTN (CMS/HCC)    • Type 2 diabetes mellitus (CMS/HCC)      Past Surgical History:  Past Surgical History:   Procedure Laterality Date   • COLONOSCOPY N/A 3/14/2021    Procedure: COLONOSCOPY with endoscopic sclerotherapy and endoscopic clipping;  Surgeon: Goldy Healy MD;  Location: [facility] ENDOSCOPY;  Service: Gastroenterology;  Laterality: N/A;  post: diverticulosis, post surgical changes   • PROSTATE SURGERY        Home Meds:  Medications Prior to Admission   Medication Sig Dispense Refill Last Dose   • insulin glargine (LANTUS, SEMGLEE) 100 UNIT/ML injection Inject 20 Units under the skin into the appropriate area as directed Every Night.      • aspirin (Aspir-Low) 81 MG EC tablet Take 81 mg by mouth Daily.      • bisoprolol (ZEBeta) 5 MG tablet Take 5 mg by mouth Daily.      • Cholecalciferol (vitamin D3) 125 MCG (5000 UT) capsule capsule Take 5,000 Units by mouth Daily.      • escitalopram (LEXAPRO) 10 MG tablet Take 10 mg by mouth Daily.      • famotidine (PEPCID) 40 MG tablet Take 1 tablet by mouth Daily. 30 tablet 3    • finasteride (PROSCAR) 5 MG tablet Take 5 mg by mouth Daily.      • furosemide (LASIX) 20 MG tablet Take 40 mg by mouth Daily.      • levETIRAcetam XR (KEPPRA XR) 500 MG 24 hr tablet Take 2,000 mg by mouth Daily.      • potassium chloride (K-DUR,KLOR-CON) 20 MEQ CR tablet Take 40 mEq by mouth Daily.      • pravastatin (PRAVACHOL) 40 MG tablet Take 40 mg by mouth Daily.        Current Meds:     Current Facility-Administered Medications:   •  sodium chloride 0.9 % infusion, 125 mL/hr, Intravenous, Continuous, Ahmet Hernandez MD, Last Rate: 125 mL/hr at 07/10/21 0454, 125 mL/hr at 07/10/21 0454    Allergies:  No Known Allergies  Social History:   Social History     Tobacco Use   • Smoking status: Never Smoker   • Smokeless tobacco: Never Used    "  Substance Use Topics   • Alcohol use: Not Currently      Family History:  Family History   Problem Relation Age of Onset   • Cancer Father         multipule mylomia         Review of Systems  Reviewed 12 systems were reviewed, all negative except for those mentioned in HPI    Objective:  Vitals:   /78 (BP Location: Right arm, Patient Position: Lying)   Pulse 82   Temp 96.9 °F (36.1 °C) (Axillary)   Resp 18   Ht 180.3 cm (71\")   Wt 89.7 kg (197 lb 12 oz)   SpO2 94%   BMI 27.58 kg/m²   I/O:     Intake/Output Summary (Last 24 hours) at 7/10/2021 1124  Last data filed at 7/10/2021 0745  Gross per 24 hour   Intake 0 ml   Output --   Net 0 ml       Exam:  General Appearance: Confused  Head:  Normocephalic, without obvious abnormality, atraumatic  Eyes:  PERRL, conjunctiva/corneas clear     Neck:  Supple,  no adenopathy;      Lungs:  Decreased BS occasion ronchi  Heart:  Regular rate and rhythm, S1 and S2 normal  Abdomen:  Soft, non-tender, bowel sounds active   Extremities: trace edema  Pulses: 2+ and symmetric all extremities  Skin:  No rashes or lesions  Data Review:  All labs (24hrs):   Recent Results (from the past 24 hour(s))   Protime-INR    Collection Time: 07/10/21  4:21 AM    Specimen: Blood   Result Value Ref Range    Protime 14.0 (H) 9.6 - 11.7 Seconds    INR 1.28 (H) 0.93 - 1.10   aPTT    Collection Time: 07/10/21  4:21 AM    Specimen: Blood   Result Value Ref Range    PTT 30.0 24.0 - 31.0 seconds   Type & Screen    Collection Time: 07/10/21  4:21 AM    Specimen: Blood   Result Value Ref Range    ABO Type A     RH type Positive     Antibody Screen Negative     T&S Expiration Date 7/13/2021 11:59:59 PM    CBC Auto Differential    Collection Time: 07/10/21  4:21 AM    Specimen: Blood   Result Value Ref Range    WBC 5.80 3.40 - 10.80 10*3/mm3    RBC 4.81 4.14 - 5.80 10*6/mm3    Hemoglobin 10.4 (L) 13.0 - 17.7 g/dL    Hematocrit 35.1 (L) 37.5 - 51.0 %    MCV 73.0 (L) 79.0 - 97.0 fL    MCH 21.6 (L) " 26.6 - 33.0 pg    MCHC 29.6 (L) 31.5 - 35.7 g/dL    RDW 25.4 (H) 12.3 - 15.4 %    RDW-SD 66.1 (H) 37.0 - 54.0 fl    MPV 9.9 6.0 - 12.0 fL    Platelets 229 140 - 450 10*3/mm3    Neutrophil % 71.0 42.7 - 76.0 %    Lymphocyte % 13.8 (L) 19.6 - 45.3 %    Monocyte % 10.4 5.0 - 12.0 %    Eosinophil % 4.4 0.3 - 6.2 %    Basophil % 0.4 0.0 - 1.5 %    Neutrophils, Absolute 4.10 1.70 - 7.00 10*3/mm3    Lymphocytes, Absolute 0.80 0.70 - 3.10 10*3/mm3    Monocytes, Absolute 0.60 0.10 - 0.90 10*3/mm3    Eosinophils, Absolute 0.30 0.00 - 0.40 10*3/mm3    Basophils, Absolute 0.00 0.00 - 0.20 10*3/mm3    nRBC 0.3 (H) 0.0 - 0.2 /100 WBC   BNP    Collection Time: 07/10/21  4:21 AM    Specimen: Blood   Result Value Ref Range    proBNP 18,999.0 (H) 0.0-1,800.0 pg/mL   ECG 12 Lead    Collection Time: 07/10/21  4:53 AM   Result Value Ref Range    QT Interval 444 ms   Comprehensive Metabolic Panel    Collection Time: 07/10/21  5:17 AM    Specimen: Blood   Result Value Ref Range    Glucose 114 (H) 65 - 99 mg/dL    BUN 42 (H) 8 - 23 mg/dL    Creatinine 2.05 (H) 0.76 - 1.27 mg/dL    Sodium 138 136 - 145 mmol/L    Potassium 4.9 3.5 - 5.2 mmol/L    Chloride 107 98 - 107 mmol/L    CO2 21.0 (L) 22.0 - 29.0 mmol/L    Calcium 8.3 8.2 - 9.6 mg/dL    Total Protein 6.7 6.0 - 8.5 g/dL    Albumin 3.20 (L) 3.50 - 5.20 g/dL    ALT (SGPT) 13 1 - 41 U/L    AST (SGOT) 17 1 - 40 U/L    Alkaline Phosphatase 77 39 - 117 U/L    Total Bilirubin 0.7 0.0 - 1.2 mg/dL    eGFR Non African Amer 30 (L) >60 mL/min/1.73    Globulin 3.5 gm/dL    A/G Ratio 0.9 g/dL    BUN/Creatinine Ratio 20.5 7.0 - 25.0    Anion Gap 10.0 5.0 - 15.0 mmol/L   Troponin    Collection Time: 07/10/21  5:17 AM    Specimen: Blood   Result Value Ref Range    Troponin T 0.086 (C) 0.000 - 0.030 ng/mL   Procalcitonin    Collection Time: 07/10/21  5:17 AM    Specimen: Blood   Result Value Ref Range    Procalcitonin 0.09 0.00 - 0.25 ng/mL   Respiratory Panel PCR w/COVID-19(SARS-CoV-2)  DELORIS/RUDI/SORAIDA/PAD/COR/MAD/PADILLA In-House, NP Swab in UTM/VTM, 3-4 HR TAT - Swab, Nasopharynx    Collection Time: 07/10/21  5:36 AM    Specimen: Nasopharynx; Swab   Result Value Ref Range    ADENOVIRUS, PCR Not Detected Not Detected    Coronavirus 229E Not Detected Not Detected    Coronavirus HKU1 Not Detected Not Detected    Coronavirus NL63 Not Detected Not Detected    Coronavirus OC43 Not Detected Not Detected    COVID19 Not Detected Not Detected - Ref. Range    Human Metapneumovirus Not Detected Not Detected    Human Rhinovirus/Enterovirus Not Detected Not Detected    Influenza A PCR Not Detected Not Detected    Influenza B PCR Not Detected Not Detected    Parainfluenza Virus 1 Not Detected Not Detected    Parainfluenza Virus 2 Not Detected Not Detected    Parainfluenza Virus 3 Not Detected Not Detected    Parainfluenza Virus 4 Not Detected Not Detected    RSV, PCR Not Detected Not Detected    Bordetella pertussis pcr Not Detected Not Detected    Bordetella parapertussis PCR Not Detected Not Detected    Chlamydophila pneumoniae PCR Not Detected Not Detected    Mycoplasma pneumo by PCR Not Detected Not Detected   POC Glucose Once    Collection Time: 07/10/21 11:18 AM    Specimen: Blood   Result Value Ref Range    Glucose 82 70 - 105 mg/dL       Current Facility-Administered Medications:   •  sodium chloride 0.9 % infusion, 125 mL/hr, Intravenous, Continuous, Ahmet Hernandez MD, Last Rate: 125 mL/hr at 07/10/21 0454, 125 mL/hr at 07/10/21 0454    Assessment:  Acute kidney injury likely secondary to prerenal etiology from GI bleed  Metabolic acidosis  Noted right pleural effusion  Possible pneumonia  Anemia  GI bleed  Elevated troponin  Elevated BNP  Ascites  Diverticulosis without diverticulitis  Hepatic cysts  Renal cyst  Chronic pancreatitis      Recommendations:  Creatinine is already improving with IV fluids  Patient is requiring oxygen and there is some evidence of possible pneumonia or pulmonary edema  I would  reduce the IV fluids to 50 cc an hour  May need diuresis  Recheck labs in the morning  If develops any hypoxic events I would diurese him and stop IV fluids  Antibiotics per primary team    Ravi Bailon MD  7/10/2021  11:24 EDT

## 2021-07-10 NOTE — PLAN OF CARE
Goal Outcome Evaluation:      Pt admitted from ED. Daughter called and updated. Paperwork from facility states pt is DNR. MD called for orders. Stool with BRB. Daughter states his last colonoscopy was approx. 2 months ago. Will wait for orders.

## 2021-07-10 NOTE — ED NOTES
Pt brought in via EMS for reported bloody stool around 0200. Pt facility reports pt has had bright red stool tinged with blood this am while in pt sleep. Pt from [facility].      Ryan Arndt, JAYLYN  07/10/21 7460

## 2021-07-11 NOTE — PLAN OF CARE
Goal Outcome Evaluation:  Plan of Care Reviewed With: patient        Progress: improving  Outcome Summary: Only one very small bloody smear noted tonight after providing inc care. Skip well. Lab called this am to report BS=67. Pt drank OJ and fed a snacl. BS then was 87

## 2021-07-11 NOTE — THERAPY EVALUATION
Acute Care - Speech Language Pathology   Swallow Initial Evaluation NELLI Villagran     Patient Name: Jim Montaño  : 1926  MRN: [id number]  Today's Date: 2021               Admit Date: 7/10/2021  Patient was not wearing a face mask during this therapy encounter. Therapist used appropriate personal protective equipment including mask, eye protection and gloves.  Mask used was standard procedure mask. Appropriate PPE was worn during the entire therapy session. Hand hygiene was completed before and after therapy session. Patient is not in enhanced droplet precautions.       Visit Dx:     ICD-10-CM ICD-9-CM   1. Gastrointestinal hemorrhage, unspecified gastrointestinal hemorrhage type  K92.2 578.9   2. Pleural effusion on right  J90 511.9     Patient Active Problem List   Diagnosis   • Type 2 diabetes mellitus with hyperglycemia, with long-term current use of insulin (CMS/HCC)   • Temporary cerebral vascular dysfunction   • Cerebrovascular accident (CVA) (CMS/HCC)   • Seizure disorder (CMS/HCC)   • Essential hypertension   • Mixed hyperlipidemia   • Gastroesophageal reflux disease   • Cough   • Colon cancer (CMS/HCC)   • Body mass index (BMI) of 29.0-29.9 in adult   • Atrial fibrillation (CMS/HCC)   • Adenocarcinoma of cecum (CMS/HCC)   • Hypokalemia   • Hemorrhoids   • Hemorrhage of rectum and anus   • Fatigue   • Upper respiratory infection   • Congestive heart failure (CMS/HCC)   • Chronic renal insufficiency, stage III (moderate) (CMS/HCC)   • Bradycardia   • Blood in urine   • Benign prostatic hyperplasia   • Hyperglycemia   • Stage 3 chronic kidney disease (CMS/HCC)   • Gastrointestinal hemorrhage     Past Medical History:   Diagnosis Date   • A-fib (CMS/HCC)    • BPH (benign prostatic hyperplasia)    • Cancer (CMS/HCC)     colon   • Cardiomegaly    • CHF (congestive heart failure) (CMS/HCC)    • CKD (chronic kidney disease) stage 3, GFR 30-59 ml/min (CMS/HCC)    • Coronary artery disease    • Epilepsy  (CMS/HCC)    • GERD (gastroesophageal reflux disease)    • Hyperlipidemia    • Hypertension    • OA (osteoarthritis)    • Pulmonary HTN (CMS/HCC)    • Type 2 diabetes mellitus (CMS/HCC)      Past Surgical History:   Procedure Laterality Date   • COLONOSCOPY N/A 3/14/2021    Procedure: COLONOSCOPY with endoscopic sclerotherapy and endoscopic clipping;  Surgeon: Goldy Healy MD;  Location: [facility];  Service: Gastroenterology;  Laterality: N/A;  post: diverticulosis, post surgical changes   • PROSTATE SURGERY          SWALLOW EVALUATION (last 72 hours)      SLP Adult Swallow Evaluation     Row Name 07/11/21 1800          Document Type  evaluation  -CP    Subjective Information  no complaints  -CP    Patient Observations  cooperative;decreased LOC  -CP    Patient/Family/Caregiver Comments/Observations  Pt was sleepy but able to awaken for swallow eval. pt never opened eyes, but was able to respond to some questions.   -CP    Patient Effort  adequate  -CP          Patient Profile Reviewed  yes  -CP    Pertinent History Of Current Problem  Patient is a 89 y.o. male with multiple medical problems who was transferred from his skilled nursing facility because of note of rectal bleeding.  Patient is unable to provide any additional history.  Daughter-in-law is in the room and is able to provide some history.  She states that since his last hospitalization for hypoglycemia he has done quite poorly.  He has continued to decline.  He has been more confused.  He is not eating well.  Family has made a decision to make him DNR and to focus on his comfort. Swallow evaluation ordered due to weakness and pneumonia.   -CP    Current Method of Nutrition  regular textures;thin liquids  -CP    Prior Level of Function-Swallowing  regular textures;thin liquids;concerns regarding malnutrition  -CP    Plans/Goals Discussed with  patient;other (see comments) RN  -CP    Barriers to Rehab  medically complex  -CP           Additional Documentation  Pain Scale: FACES Pre/Post-Treatment (Group)  -CP          Pain: FACES Scale, Pretreatment  0-->no hurt  -CP    Posttreatment Pain Rating  0-->no hurt  -CP          Dentition Assessment  missing teeth  -CP    Secretion Management  WNL/WFL  -CP    Mucosal Quality  moist, healthy  -CP          Oral Motor General Assessment  generalized oral motor weakness  -CP          Respiratory Support Currently in Use  nasal cannula  -CP    Eating/Swallowing Skills  fed by SLP  -CP    Positioning During Eating  upright in bed  -CP    Utensils Used  straw  -CP    Consistencies Trialed  thin liquids  -CP          Clinical Swallow Evaluation Summary  Pt seen for clinical swallow eval. Pt given trials of water by straw only due to increasing wheezing and wet vocal quality.  Pt had good labial closure over straw and was able to pull liquids from the straw appropriately. Pt had timely swallow per palpation. Pt had no immediate s/s of aspiration, however began to have increasing wheezing and whistling breath sounds with wet vocal quality. Pt unable to clear wet vocal quality and had weak volitional cough when cued to cough and try to clear secretions. Pt does not appear safe for PO at this time. It is rec that pt remain NPO for now. Further eval of swallwo is rec with [facility] in the am. ST will follow up with recs from [facility].   -CP          SLP Swallowing Diagnosis  mod-severe;suspected pharyngeal dysphagia  -CP    Functional Impact  risk of aspiration/pneumonia  -CP    Rehab Potential/Prognosis, Swallowing  good, to achieve stated therapy goals  -CP    Swallow Criteria for Skilled Therapeutic Interventions Met  demonstrates skilled criteria  -CP          Therapy Frequency (Swallow)  PRN  -CP    Predicted Duration Therapy Intervention (Days)  until discharge  -CP    SLP Diet Recommendation  NPO  -CP    Recommended Diagnostics  [place] [facility] [place] [place]    SLP Rec. for Method of Medication Administration  meds via  alternate route  -CP          Oral Nutrition/Hydration Goal Selection (SLP)  oral nutrition/hydration, SLP goal 1;oral nutrition/hydration, SLP goal 2  -CP          Oral Nutrition/Hydration Goal 1, SLP  Pt will have re-eval of swallow including instrumental swallow evaluation of indicated, within 48 hours.   -CP    Time Frame (Oral Nutrition/Hydration Goal 1, SLP)  2 days  -CP          Oral Nutrition/Hydration Goal 2, SLP  Pt will tolerate safest and least restrictive diet/liquids with no complications of aspiration.   -CP    Time Frame (Oral Nutrition/Hydration Goal 2, SLP)  by discharge  -CP      User Key  (r) = Recorded By, (t) = Taken By, (c) = Cosigned By    Initials Name Effective Dates    CP Ping Vidal, STEFANO 06/16/21 -           EDUCATION  The patient has been educated in the following areas:   Dysphagia (Swallowing Impairment) NPO rationale.    SLP Recommendation and Plan  SLP Swallowing Diagnosis: mod-severe, suspected pharyngeal dysphagia  SLP Diet Recommendation: NPO     SLP Rec. for Method of Medication Administration: meds via alternate route        Recommended Diagnostics: reassess via [facility] ([facility])  Swallow Criteria for Skilled Therapeutic Interventions Met: demonstrates skilled criteria     Rehab Potential/Prognosis, Swallowing: good, to achieve stated therapy goals  Therapy Frequency (Swallow): PRN  Predicted Duration Therapy Intervention (Days): until discharge                              SLP GOALS     Row Name 07/11/21 1800             Oral Nutrition/Hydration Goal 1 (SLP)    Oral Nutrition/Hydration Goal 1, SLP  Pt will have re-eval of swallow including instrumental swallow evaluation of indicated, within 48 hours.   -CP      Time Frame (Oral Nutrition/Hydration Goal 1, SLP)  2 days  -CP         Oral Nutrition/Hydration Goal 2 (SLP)    Oral Nutrition/Hydration Goal 2, SLP  Pt will tolerate safest and least restrictive diet/liquids with no complications of aspiration.   -CP      Time Frame (Oral  Nutrition/Hydration Goal 2, SLP)  by discharge  -CP        User Key  (r) = Recorded By, (t) = Taken By, (c) = Cosigned By    Initials Name Provider Type    CP Ping Vidal, SLP Speech and Language Pathologist             Time Calculation:                STEFANO Huff  7/11/2021

## 2021-07-11 NOTE — PLAN OF CARE
Goal Outcome Evaluation:   Pt seen for clinical swallow eval. Pt given trials of water by straw only due to increasing wheezing and wet vocal quality.  Pt had good labial closure over straw and was able to pull liquids from the straw appropriately. Pt had timely swallow per palpation. Pt had no immediate s/s of aspiration, however began to have increasing wheezing and whistling breath sounds with wet vocal quality. Pt unable to clear wet vocal quality and had weak volitional cough when cued to cough and try to clear secretions. Pt does not appear safe for PO at this time. It is rec that pt remain NPO for now. Further eval of nainamarzena is rec with [facility] in the am. ST will follow up with recs from [facility].

## 2021-07-11 NOTE — PROGRESS NOTES
LOS: 0 days   Patient Care Team:  John Burleson MD as PCP - General  John Burleson MD as PCP - Family Medicine    Subjective     Interval History:    Patient Complaints: Pt is more alert but still lethargic.  Denies any problems to me.  No family present during my visit.    History taken from: patient    Review of Systems   Unable to perform ROS: Mental status change           Objective     Vital Signs  Temp:  [96.8 °F (36 °C)-97.2 °F (36.2 °C)] 96.8 °F (36 °C)  Heart Rate:  [70-86] 73  Resp:  [15-23] 15  BP: (106-141)/(69-80) 115/70    Physical Exam:     General Appearance:    Awake, answers simple yes/no questions in no acute distress,   Head:    Normocephalic, without obvious abnormality, atraumatic   Eyes:            Lids and lashes normal, conjunctivae and sclerae normal, no   icterus, no pallor, corneas clear, PERRLA   Ears:    Ears appear intact with no abnormalities noted   Throat:   No oral lesions, no thrush, oral mucosa moist   Neck:   No adenopathy, supple, trachea midline, no thyromegaly, no   carotid bruit, no JVD   Lungs:   Scattered upper airway congestion and rhonchi    Heart:    Regular rhythm and normal rate, normal S1 and S2, no            murmur, no gallop, no rub, no click   Chest Wall:    No abnormalities observed   Abdomen:     Normal bowel sounds, no masses, no organomegaly, soft        Non-tender non-distended, no guarding,   Extremities:   Moves all extremities well, no edema, no cyanosis, no             Redness   Pulses:   Pulses palpable and equal bilaterally   Skin:   No bleeding, bruising or rash   Lymph nodes:   No palpable adenopathy   Neurologic:   Cranial nerves 2 - 12 grossly intact, sensation intact, DTR       present and equal bilaterally        Results Review:    Lab Results (last 24 hours)     Procedure Component Value Units Date/Time    Blood Culture - Blood, Hand, Left [[id number]] Collected: 07/10/21 0557    Specimen: Blood from Hand, Left Updated: 07/11/21 0615        Blood Culture No growth at 24 hours    Blood Culture - Blood, Arm, Left [[id number]] Collected: 07/10/21 0557    Specimen: Blood from Arm, Left Updated: 07/11/21 0615     Blood Culture No growth at 24 hours    POC Glucose Once [[id number]]  (Normal) Collected: 07/11/21 0439    Specimen: Blood Updated: 07/11/21 0440     Glucose 87 mg/dL      Comment: Serial Number: [id number]:  [id number]       Comprehensive Metabolic Panel [[id number]]  (Abnormal) Collected: 07/11/21 0329    Specimen: Blood Updated: 07/11/21 0412     Glucose 67 mg/dL      BUN 40 mg/dL      Creatinine 1.93 mg/dL      Sodium 143 mmol/L      Potassium 4.8 mmol/L      Chloride 111 mmol/L      CO2 23.0 mmol/L      Calcium 8.5 mg/dL      Total Protein 6.8 g/dL      Albumin 3.30 g/dL      ALT (SGPT) 13 U/L      AST (SGOT) 15 U/L      Alkaline Phosphatase 75 U/L      Total Bilirubin 0.9 mg/dL      eGFR Non African Amer 33 mL/min/1.73      Globulin 3.5 gm/dL      A/G Ratio 0.9 g/dL      BUN/Creatinine Ratio 20.7     Anion Gap 9.0 mmol/L     Narrative:      GFR Normal >60  Chronic Kidney Disease <60  Kidney Failure <15      Troponin [[id number]]  (Abnormal) Collected: 07/11/21 0329    Specimen: Blood Updated: 07/11/21 0412     Troponin T 0.083 ng/mL     Narrative:      Troponin T Reference Range:  <= 0.03 ng/mL-   Negative for AMI  >0.03 ng/mL-     Abnormal for myocardial necrosis.  Clinicians would have to utilize clinical acumen, EKG, Troponin and serial changes to determine if it is an Acute Myocardial Infarction or myocardial injury due to an underlying chronic condition.       Results may be falsely decreased if patient taking Biotin.      CK [[id number]]  (Normal) Collected: 07/11/21 0329    Specimen: Blood Updated: 07/11/21 0411     Creatine Kinase 66 U/L            Imaging Results (Last 24 Hours)     ** No results found for the last 24 hours. **               I reviewed the patient's new clinical results.    Medication Review:   Scheduled  Meds:escitalopram, 10 mg, Oral, Daily  finasteride, 5 mg, Oral, Daily  levETIRAcetam XR, 2,000 mg, Oral, Daily  pantoprazole, 40 mg, Oral, Q AM  piperacillin-tazobactam, 3.375 g, Intravenous, Q8H  sodium chloride, 3 mL, Intravenous, Q12H      Continuous Infusions:Pharmacy to Dose Zosyn,   sodium chloride, 50 mL/hr, Last Rate: 50 mL/hr (07/11/21 0538)      PRN Meds:.•  acetaminophen  •  metoprolol tartrate  •  ondansetron  •  Pharmacy to Dose Zosyn  •  sodium chloride     Assessment/Plan       Gastrointestinal hemorrhage  - no further visible blood loss.  No workup planned.  Acute kidney injury - creatinine improving with gentle hydration.  Pneumonia - continue Zosyn  Elevated troponin - no signs of chest pain or SOA:  Troponin is not trending, so likely elevated due to renal failure  Seizure disorder - Keppra    Continue IVF.  Transfer  Back to [facility] once creatinine back to baseline.    Plan for disposition:[facility]    Elaine Tapia MD  07/11/21  15:45 EDT

## 2021-07-11 NOTE — PLAN OF CARE
"Goal Outcome Evaluation:         Patient had one very small bloody BM today, patient is incontinent of stool and urine. Patient denied being in pain, stated \" I feel much better today\". Patient is a total feed when food is delivered. Vitals WNL, with no needs known at this time.         "

## 2021-07-11 NOTE — PROGRESS NOTES
"                                                                                                                                      Nephrology  Progress Note                                        [facility]    Patient Identification    Name: Jim Montaño  Age: 89 y.o.  Sex: male  :  1926  MRN: [id number]      DATE OF SERVICE:  2021        Subective    No new complaints     Objective   Scheduled Meds:escitalopram, 10 mg, Oral, Daily  finasteride, 5 mg, Oral, Daily  levETIRAcetam XR, 2,000 mg, Oral, Daily  pantoprazole, 40 mg, Oral, Q AM  piperacillin-tazobactam, 3.375 g, Intravenous, Q8H  sodium chloride, 3 mL, Intravenous, Q12H          Continuous Infusions:Pharmacy to Dose Zosyn,   sodium chloride, 50 mL/hr, Last Rate: 50 mL/hr (21 0538)        PRN Meds:•  acetaminophen  •  metoprolol tartrate  •  ondansetron  •  Pharmacy to Dose Zosyn  •  sodium chloride     Exam:  /71 (BP Location: Right arm, Patient Position: Lying)   Pulse 75   Temp 97.2 °F (36.2 °C) (Axillary)   Resp 22   Ht 180.3 cm (71\")   Wt 89.7 kg (197 lb 12 oz)   SpO2 92%   BMI 27.58 kg/m²     Intake/Output last 3 shifts:  I/O last 3 completed shifts:  In: 963 [P.O.:380; I.V.:583]  Out: -     Intake/Output this shift:  No intake/output data recorded.    Physical exam:  General Appearance:  Alert  Head:  Normocephalic, without obvious abnormality, atraumatic  Eyes:  PERRL, conjunctiva/corneas clear     Neck:  Supple,  no adenopathy;      Lungs:  Decreased BS occasion ronchi  Heart:  Regular rate and rhythm, S1 and S2 normal  Abdomen:  Soft, non-tender, bowel sounds active   Extremities: trace edema  Pulses: 2+ and symmetric all extremities  Skin:  No rashes or lesions       Data Review:  All labs (24hrs):   Recent Results (from the past 24 hour(s))   POC Glucose Once    Collection Time: 07/10/21 11:18 AM    Specimen: Blood   Result Value Ref Range    Glucose 82 70 - 105 mg/dL   Basic Metabolic " Panel    Collection Time: 07/10/21 12:49 PM    Specimen: Blood   Result Value Ref Range    Glucose 79 65 - 99 mg/dL    BUN 39 (H) 8 - 23 mg/dL    Creatinine 2.13 (H) 0.76 - 1.27 mg/dL    Sodium 144 136 - 145 mmol/L    Potassium 4.7 3.5 - 5.2 mmol/L    Chloride 111 (H) 98 - 107 mmol/L    CO2 23.0 22.0 - 29.0 mmol/L    Calcium 8.4 8.2 - 9.6 mg/dL    eGFR Non African Amer 29 (L) >60 mL/min/1.73    BUN/Creatinine Ratio 18.3 7.0 - 25.0    Anion Gap 10.0 5.0 - 15.0 mmol/L   Amylase    Collection Time: 07/10/21 12:49 PM    Specimen: Blood   Result Value Ref Range    Amylase 21 (L) 28 - 100 U/L   Lipase    Collection Time: 07/10/21 12:49 PM    Specimen: Blood   Result Value Ref Range    Lipase 9 (L) 13 - 60 U/L   Sodium, Urine, Random - Urine, Clean Catch    Collection Time: 07/10/21  3:16 PM    Specimen: Urine, Clean Catch   Result Value Ref Range    Sodium, Urine <20 mmol/L   Comprehensive Metabolic Panel    Collection Time: 07/11/21  3:29 AM    Specimen: Blood   Result Value Ref Range    Glucose 67 65 - 99 mg/dL    BUN 40 (H) 8 - 23 mg/dL    Creatinine 1.93 (H) 0.76 - 1.27 mg/dL    Sodium 143 136 - 145 mmol/L    Potassium 4.8 3.5 - 5.2 mmol/L    Chloride 111 (H) 98 - 107 mmol/L    CO2 23.0 22.0 - 29.0 mmol/L    Calcium 8.5 8.2 - 9.6 mg/dL    Total Protein 6.8 6.0 - 8.5 g/dL    Albumin 3.30 (L) 3.50 - 5.20 g/dL    ALT (SGPT) 13 1 - 41 U/L    AST (SGOT) 15 1 - 40 U/L    Alkaline Phosphatase 75 39 - 117 U/L    Total Bilirubin 0.9 0.0 - 1.2 mg/dL    eGFR Non African Amer 33 (L) >60 mL/min/1.73    Globulin 3.5 gm/dL    A/G Ratio 0.9 g/dL    BUN/Creatinine Ratio 20.7 7.0 - 25.0    Anion Gap 9.0 5.0 - 15.0 mmol/L   CK    Collection Time: 07/11/21  3:29 AM    Specimen: Blood   Result Value Ref Range    Creatine Kinase 66 20 - 200 U/L   Troponin    Collection Time: 07/11/21  3:29 AM    Specimen: Blood   Result Value Ref Range    Troponin T 0.083 (C) 0.000 - 0.030 ng/mL   POC Glucose Once    Collection Time: 07/11/21  4:39 AM     Specimen: Blood   Result Value Ref Range    Glucose 87 70 - 105 mg/dL          Imaging:  [unfilled]    Assessment/Plan:     Gastrointestinal hemorrhage       Acute kidney injury likely secondary to prerenal etiology from GI bleed  Metabolic acidosis  Noted right pleural effusion  Possible pneumonia  Anemia  GI bleed  Elevated troponin  Elevated BNP  Ascites  Diverticulosis without diverticulitis  Hepatic cysts  Renal cyst  Chronic pancreatitis     Creatinine down to 1.9  Watch potassium 4.8  Continue gentle hydration

## 2021-07-12 NOTE — CASE MANAGEMENT/SOCIAL WORK
Discharge Planning Assessment  [facility]     Patient Name: Jim Montaño  MRN: [id number]  Today's Date: 7/12/2021    Admit Date: 7/10/2021    Discharge Needs Assessment     Row Name 07/12/21 3937       Living Environment    Lives With  facility resident    Name(s) of Who Lives With Patient  at [facility] for rehab, previously from [facility]    Current Living Arrangements  independent/assisted living facility    Primary Care Provided by  self    Provides Primary Care For  no one, unable/limited ability to care for self    Family Caregiver if Needed  child(thaddeus), adult;other (see comments)    Family Caregiver Names  private paid caregiver    Quality of Family Relationships  helpful    Able to Return to Prior Arrangements  yes       Resource/Environmental Concerns    Resource/Environmental Concerns  none    Transportation Concerns  car, none       Transition Planning    Patient/Family Anticipates Transition to  inpatient rehabilitation facility    Patient/Family Anticipated Services at Transition  none    Transportation Anticipated  health plan transportation       Discharge Needs Assessment    Equipment Currently Used at Home  walker, rolling;oxygen home oxygen with [employer]    Concerns to be Addressed  denies needs/concerns at this time    Anticipated Changes Related to Illness  none    Equipment Needed After Discharge  none        Discharge Plan     Row Name 07/12/21 7196       Plan    Plan  D/C Plan: Return to [facility], rehab. Okay to return per family/facility liaison. No PASRR or precert required for return.    Patient/Family in Agreement with Plan  yes    Plan Comments  CM spoke to patient's daughter, Poonam Franklin, on the phone. Agreeable to patient returning to [facility] for rehab at d/c. CM confirmed with [facility] liaison that patient may return at d/c. Prior to rehab, patient from [facility] with private paid caregivers who assist with ADLs and transportation. Daughter denies any d/c  needs at this time. GIL letter reviewed with daughter, verbal consent obtained and copy emailed to daughter at [email or web address]. Barrier to D/C: IV abx, VFSS today (unable to complete), IVFs.        Continued Care and Services - Admitted Since 7/10/2021     Destination Coordination complete    Service Provider Request Status Selected Services Address Phone Fax Patient Preferred    [facility]   Selected Skilled Nursing [address] [place] [address] [phone] --            Selected Continued Care - Prior Encounters Includes selections from prior encounters from 4/11/2021 to 7/12/2021    Discharged on 6/25/2021 Admission date: 6/19/2021 - Discharge disposition: Skilled Nursing Facility (DC - External)    Destination     Service Provider Selected Services Address Phone Fax Patient Preferred    [facility]  Skilled Nursing [address] [place] [address] [phone] --                    Expected Discharge Date and Time     Expected Discharge Date Expected Discharge Time    Jul 14, 2021         Demographic Summary     Row Name 07/12/21 1456       General Information    Admission Type  inpatient    Arrived From  emergency department    Referral Source  admission list    Reason for Consult  discharge planning    Preferred Language  English     Used During This Interaction  no        Functional Status     Row Name 07/12/21 1456       Functional Status    Usual Activity Tolerance  fair    Current Activity Tolerance  fair       Functional Status, IADL    Medications  assistive person    Meal Preparation  assistive person    Housekeeping  assistive person    Laundry  assistive person    Shopping  assistive person       Mental Status Summary    Recent Changes in Mental Status/Cognitive Functioning  unable to assess    Mental Status Comments  spoke to patient's daughter, Poonam Franklin          Patient Forms     Row Name  07/12/21 1503       Patient Forms    Important Message from Medicare (IMM)  -- LOUISE 7/12/21    Patient Observation Letter  Delivered    Delivered to  Support person    Method of delivery  Other (comment) emailed to [email or web address]                 Case Management Readmission Assessment Note       Case Management Readmission Assessment (all recorded)      Readmission Interview     Row Name 07/12/21 1505             Readmission Indications    Is this hospitalization related to the prior hospital diagnosis?  No      What was the reason you were admitted?  Patient admitted 6/19-6/25 for hypoglycemia. Patient discharged to [facility] for rehab. Patient admitted back to ER on 7/10 for bleeding.      Row Name 07/12/21 1505             Recommendation for rehospitalization    Did you speak with your physician prior to coming to the hospital  No      Did you seek care elsewhere prior to coming to the hospital?  No      Row Name 07/12/21 1505             Follow up appointment    Do you have a PCP?  Yes      Did you have an appointment with PCP/specialist after hospitalization within 7 days?  No in rehab      Row Name 07/12/21 1505             Medications    Did you have newly prescribed medications at discharge?  Yes      Did you understand the side effects of your medications?  Yes      Are you taking all of you prescribed medications?  Yes      Row Name 07/12/21 1505             Discharge Instructions    Did you understand your discharge instructions?  Yes      Did your family/caregiver hear your instructions?  Yes      [facility] Name 07/12/21 1505             Index discharge location/services    Where did you go upon discharge?  Skilled Nursing Facility      Row Name 07/12/21 1503             Discharge Readiness    On a scale of 1-5 (5 being well prepared), how ready were you for discharge  4

## 2021-07-12 NOTE — PROGRESS NOTES
"                                                                                                                                      Nephrology  Progress Note                                        [facility]    Patient Identification    Name: Jim Montaño  Age: 89 y.o.  Sex: male  :  1926  MRN: [id number]      DATE OF SERVICE:  2021        Subective    No new complaints     Objective   Scheduled Meds:escitalopram, 10 mg, Oral, Daily  finasteride, 5 mg, Oral, Daily  levETIRAcetam XR, 2,000 mg, Oral, Daily  pantoprazole, 40 mg, Oral, Q AM  piperacillin-tazobactam, 3.375 g, Intravenous, Q8H  sodium chloride, 3 mL, Intravenous, Q12H          Continuous Infusions:dextrose 5 % and sodium chloride 0.45 %, 75 mL/hr, Last Rate: 75 mL/hr (21)  Pharmacy to Dose Zosyn,         PRN Meds:•  acetaminophen  •  metoprolol tartrate  •  ondansetron  •  Pharmacy to Dose Zosyn  •  sodium chloride     Exam:  /89 (BP Location: Left arm, Patient Position: Lying)   Pulse 78   Temp 97.2 °F (36.2 °C) (Oral)   Resp 15   Ht 180.3 cm (71\")   Wt 89.7 kg (197 lb 12 oz)   SpO2 90%   BMI 27.58 kg/m²     Intake/Output last 3 shifts:  I/O last 3 completed shifts:  In: 3503.4 [P.O.:600; I.V.:2703.4; IV Piggyback:200]  Out: -     Intake/Output this shift:  No intake/output data recorded.    Physical exam:  General Appearance:  Alert  Head:  Normocephalic, without obvious abnormality, atraumatic  Eyes:  PERRL, conjunctiva/corneas clear     Neck:  Supple,  no adenopathy;      Lungs:  Decreased BS occasion ronchi  Heart:  Regular rate and rhythm, S1 and S2 normal  Abdomen:  Soft, non-tender, bowel sounds active   Extremities: trace edema  Pulses: 2+ and symmetric all extremities  Skin:  No rashes or lesions       Data Review:  All labs (24hrs):   Recent Results (from the past 24 hour(s))   POC Glucose Once    Collection Time: 21 12:24 AM    Specimen: Blood   Result Value Ref Range    " Glucose 110 (H) 70 - 105 mg/dL   Basic Metabolic Panel    Collection Time: 07/12/21  4:05 AM    Specimen: Blood   Result Value Ref Range    Glucose 115 (H) 65 - 99 mg/dL    BUN 38 (H) 8 - 23 mg/dL    Creatinine 2.11 (H) 0.76 - 1.27 mg/dL    Sodium 143 136 - 145 mmol/L    Potassium 4.8 3.5 - 5.2 mmol/L    Chloride 112 (H) 98 - 107 mmol/L    CO2 18.0 (L) 22.0 - 29.0 mmol/L    Calcium 8.2 8.2 - 9.6 mg/dL    eGFR Non African Amer 29 (L) >60 mL/min/1.73    BUN/Creatinine Ratio 18.0 7.0 - 25.0    Anion Gap 13.0 5.0 - 15.0 mmol/L   CBC Auto Differential    Collection Time: 07/12/21  4:05 AM    Specimen: Blood   Result Value Ref Range    WBC 7.10 3.40 - 10.80 10*3/mm3    RBC 4.59 4.14 - 5.80 10*6/mm3    Hemoglobin 9.9 (L) 13.0 - 17.7 g/dL    Hematocrit 33.5 (L) 37.5 - 51.0 %    MCV 73.2 (L) 79.0 - 97.0 fL    MCH 21.6 (L) 26.6 - 33.0 pg    MCHC 29.5 (L) 31.5 - 35.7 g/dL    RDW 24.7 (H) 12.3 - 15.4 %    RDW-SD 63.4 (H) 37.0 - 54.0 fl    MPV 8.9 6.0 - 12.0 fL    Platelets 204 140 - 450 10*3/mm3    Neutrophil % 75.4 42.7 - 76.0 %    Lymphocyte % 13.3 (L) 19.6 - 45.3 %    Monocyte % 7.6 5.0 - 12.0 %    Eosinophil % 2.4 0.3 - 6.2 %    Basophil % 1.3 0.0 - 1.5 %    Neutrophils, Absolute 5.40 1.70 - 7.00 10*3/mm3    Lymphocytes, Absolute 0.90 0.70 - 3.10 10*3/mm3    Monocytes, Absolute 0.50 0.10 - 0.90 10*3/mm3    Eosinophils, Absolute 0.20 0.00 - 0.40 10*3/mm3    Basophils, Absolute 0.10 0.00 - 0.20 10*3/mm3    nRBC 0.3 (H) 0.0 - 0.2 /100 WBC   POC Glucose Once    Collection Time: 07/12/21  7:16 AM    Specimen: Blood   Result Value Ref Range    Glucose 102 70 - 105 mg/dL          Imaging:  [unfilled]    Assessment/Plan:     Gastrointestinal hemorrhage       Acute kidney injury likely secondary to prerenal etiology from GI bleed  Metabolic acidosis  Noted right pleural effusion  Possible pneumonia  Anemia  GI bleed  Elevated troponin  Elevated BNP  Ascites  Diverticulosis without diverticulitis  Hepatic cysts  Renal cyst  Chronic  pancreatitis     Creatinine 2.1 from 1.9  Watch potassium 4.8  Continue gentle hydration  Increase IVfs to 125 cc/hr

## 2021-07-12 NOTE — DISCHARGE PLACEMENT REQUEST
"Jim Gomez (89 y.o. Male)     Date of Birth Social Security Number Address Home Phone MRN    12/18/1926  [address] [place] [address] [phone] [id number]    Denominational Marital Status          Pent[employer]al        Admission Date Admission Type Admitting Provider Attending Provider Department, Room/Bed    7/10/21 Emergency John Burleson MD Heimer, Brian T, MD [facility] SURGICAL INPATIENT, 4122/1    Discharge Date Discharge Disposition Discharge Destination                       Attending Provider: John Burleson MD    Allergies: No Known Allergies    Isolation: None   Infection: None   Code Status: No CPR    Ht: 180.3 cm (71\")   Wt: 89.7 kg (197 lb 12 oz)    Admission Cmt: None   Principal Problem: None                Active Insurance as of 7/10/2021     Primary Coverage     Payor Plan Insurance Group Employer/Plan Group    MEDICARE MEDICARE A & B      Payor Plan Address Payor Plan Phone Number Payor Plan Fax Number Effective Dates    [address] [phone]  12/1/1991 - None Entered    [place] [address]       Subscriber Name Subscriber Birth Date Member ID       JIM GOMEZ 12/18/1926 [id number]                 Emergency Contacts      (Rel.) Home Phone Work Phone Mobile Phone    SANDIP GOMEZ (Son) -- -- [phone]    JAYME CRAWFORD (Daughter) -- -- [phone]          "

## 2021-07-12 NOTE — PLAN OF CARE
Goal Outcome Evaluation:  Plan of Care Reviewed With: patient        Progress: no change  Outcome Summary: Video swallow study performed this date. Mild oropharyngeal dysphagia present characterized by swallow initiation delay, poor mastication and recollection. No penetration or aspiration occurs with nectar thick liquids, puree, mechanical soft solids. Transient penetration occurs x1 occasion with thin liquids by straw, ejected entirely from the airway post prandial. Minimal residue with puree clears with a thin wash.     Recommend: puree and thin liquid diet. Medications: whole with thin liquids, multiple swallows or whole in puree if difficulty. Compensations: small bites/sips, single sips, upright for all PO, multiple swallows, alternate liquids and solids, full to assist feed.     SLP to follow with further recommendations, carryover of strategies.

## 2021-07-12 NOTE — MBS/VFSS/FEES
Acute Care - Speech Language Pathology   Swallow Initial Evaluation NELLI Villagran     Patient Name: Jim Montaño  : 1926  MRN: [id number]  Today's Date: 2021               Admit Date: 7/10/2021    Visit Dx:     ICD-10-CM ICD-9-CM   1. Gastrointestinal hemorrhage, unspecified gastrointestinal hemorrhage type  K92.2 578.9   2. Pleural effusion on right  J90 511.9     Patient Active Problem List   Diagnosis   • Type 2 diabetes mellitus with hyperglycemia, with long-term current use of insulin (CMS/HCC)   • Temporary cerebral vascular dysfunction   • Cerebrovascular accident (CVA) (CMS/HCC)   • Seizure disorder (CMS/HCC)   • Essential hypertension   • Mixed hyperlipidemia   • Gastroesophageal reflux disease   • Cough   • Colon cancer (CMS/HCC)   • Body mass index (BMI) of 29.0-29.9 in adult   • Atrial fibrillation (CMS/HCC)   • Adenocarcinoma of cecum (CMS/HCC)   • Hypokalemia   • Hemorrhoids   • Hemorrhage of rectum and anus   • Fatigue   • Upper respiratory infection   • Congestive heart failure (CMS/HCC)   • Chronic renal insufficiency, stage III (moderate) (CMS/HCC)   • Bradycardia   • Blood in urine   • Benign prostatic hyperplasia   • Hyperglycemia   • Stage 3 chronic kidney disease (CMS/HCC)   • Gastrointestinal hemorrhage     Past Medical History:   Diagnosis Date   • A-fib (CMS/HCC)    • BPH (benign prostatic hyperplasia)    • Cancer (CMS/HCC)     colon   • Cardiomegaly    • CHF (congestive heart failure) (CMS/HCC)    • CKD (chronic kidney disease) stage 3, GFR 30-59 ml/min (CMS/HCC)    • Coronary artery disease    • Epilepsy (CMS/HCC)    • GERD (gastroesophageal reflux disease)    • Hyperlipidemia    • Hypertension    • OA (osteoarthritis)    • Pulmonary HTN (CMS/HCC)    • Type 2 diabetes mellitus (CMS/HCC)      Past Surgical History:   Procedure Laterality Date   • COLONOSCOPY N/A 3/14/2021    Procedure: COLONOSCOPY with endoscopic sclerotherapy and endoscopic clipping;  Surgeon: Goldy Healy  MD Maya;  Location: [facility] ENDOSCOPY;  Service: Gastroenterology;  Laterality: N/A;  post: diverticulosis, post surgical changes   • PROSTATE SURGERY          SWALLOW EVALUATION (last 72 hours)      SLP Adult Swallow Evaluation     Row Name 07/12/21 1300       Rehab Evaluation    Document Type  evaluation  -AB    Subjective Information  no complaints  -AB    Patient Observations  cooperative;agree to therapy  -AB    Patient/Family/Caregiver Comments/Observations  Pt lethargic, wakes easily with repositioning   -AB    Care Plan Review  evaluation/treatment results reviewed;care plan/treatment goals reviewed;risks/benefits reviewed;current/potential barriers reviewed;patient/other agree to care plan  -AB    Patient Effort  good  -AB    Symptoms Noted During/After Treatment  none  -AB       General Information    Patient Profile Reviewed  yes  -AB    Pertinent History Of Current Problem  --    Current Method of Nutrition  NPO  -AB    Prior Level of Function-Swallowing  regular textures;thin liquids;concerns regarding malnutrition  -AB    Plans/Goals Discussed with  patient;agreed upon  -AB    Barriers to Rehab  none identified  -AB       Pain    Additional Documentation  Pain Scale: FACES Pre/Post-Treatment (Group)  -AB       Pain Scale: FACES Pre/Post-Treatment    Pain: FACES Scale, Pretreatment  0-->no hurt  -AB    Posttreatment Pain Rating  0-->no hurt  -AB       MBS/VFSS Interpretation    VFSS Summary  VFSS completed in lateral view with pt positioned at 90 degree angle, fed by SLP technician for all presentations. Assessment completed with: nectar thick liquids by cup, nectar thick liquids by straw, thin liquids by straw, puree, mechanical soft. Lingual movements slow in initiation, disorganized, weak. Lingual pumping, mildly prolonged A-P transport with puree. Mastication with decreased rotary jaw movement, decreased recollection. Premature spillage to: valleculae with nectar thick, puree, thin liquids. Base of  tongue retraction adequate. Epiglottic inversion adequate, mildly reduced with large bolus and liquids. Laryngeal elevation, anterior hyoid excursion decreased. Minimal valleculae residuals remain with nectar thick, thin liquids, puree requiring maximal cues to initiate second swallow to clear. Liquid wash (nectar) (thins) additionally effective in reduction of residuals to trace. Transient penetration from underside of epiglottis ejected from the airway with nectar thick liquid wash, thin liquid by straw x1 for consecutive sips. Penetrate material above the vocal folds and ejected from the airway post prandial.  -AB       Clinical Impression    SLP Swallowing Diagnosis  mild-moderate;oral dysphagia;pharyngeal dysphagia  -AB    Functional Impact  risk of aspiration/pneumonia  -AB    Rehab Potential/Prognosis, Swallowing  good, to achieve stated therapy goals  -AB    Swallow Criteria for Skilled Therapeutic Interventions Met  demonstrates skilled criteria  -AB       Recommendations    Therapy Frequency (Swallow)  PRN  -AB    Predicted Duration Therapy Intervention (Days)  until discharge  -AB    SLP Diet Recommendation  puree;thin liquids  -AB    Recommended Diagnostics  -- dysphagia tx  -AB    Recommended Precautions and Strategies  upright posture during/after eating;small bites of food and sips of liquid;alternate between small bites of food and sips of liquid;assist with feeding  -AB    SLP Rec. for Method of Medication Administration  with thin liquids;meds whole;with pudding or applesauce  -AB    Monitor for Signs of Aspiration  yes;notify SLP if any concerns  -AB    Anticipated Discharge Disposition (SLP)  unknown;anticipate therapy at next level of care  -AB       Swallow Goals (SLP)    Oral Nutrition/Hydration Goal Selection (SLP)  oral nutrition/hydration, SLP goal 1;oral nutrition/hydration, SLP goal 2  -AB       Oral Nutrition/Hydration Goal 1 (SLP)    Oral Nutrition/Hydration Goal 1, SLP  GOAL MET: Pt  will have re-eval of swallow including instrumental swallow evaluation of indicated, within 48 hours.   -AB    Time Frame (Oral Nutrition/Hydration Goal 1, SLP)  2 days  -AB    Progress/Outcomes (Oral Nutrition/Hydration Goal 1, SLP)  goal met  -AB       Oral Nutrition/Hydration Goal 2 (SLP)    Oral Nutrition/Hydration Goal 2, SLP  LTG: Pt will tolerate safest and least restrictive diet/liquids with no complications of aspiration.   -AB    Time Frame (Oral Nutrition/Hydration Goal 2, SLP)  by discharge  -AB    Progress/Outcomes (Oral Nutrition/Hydration Goal 2, SLP)  goal ongoing  -AB      User Key  (r) = Recorded By, (t) = Taken By, (c) = Cosigned By    Initials Name Effective Dates    Dulce Luna, MS CCC-SLP 06/16/21 -           EDUCATION  The patient has been educated in the following areas:   Dysphagia (Swallowing Impairment) Oral Care/Hydration Modified Diet Instruction.    VFSS review and education was not conducted this date. Individuals educated included: patient. Education methods/means included verbal review face to face and playback of VFSS.  Education barriers: cognitive deficit Further follow up warranted next date and warranted next session.      SLP Recommendation and Plan  Video swallow study performed this date. Mild oropharyngeal dysphagia present characterized by swallow initiation delay, poor mastication and recollection. No penetration or aspiration occurs with nectar thick liquids, puree, mechanical soft solids. Transient penetration occurs x1 occasion with thin liquids by straw, ejected entirely from the airway post prandial. Minimal residue with puree clears with a thin wash.     Recommend: puree and thin liquid diet. Medications: whole with thin liquids, multiple swallows or whole in puree if difficulty. Compensations: small bites/sips, single sips, upright for all PO, multiple swallows, alternate liquids and solids, full to assist feed. SLP to follow with further recommendations,  carryover of strategies.    SLP GOALS     Row Name 07/12/21 1300       Oral Nutrition/Hydration Goal 1 (SLP)    Oral Nutrition/Hydration Goal 1, SLP  GOAL MET: Pt will have re-eval of swallow including instrumental swallow evaluation of indicated, within 48 hours.   -AB    Time Frame (Oral Nutrition/Hydration Goal 1, SLP)  2 days  -AB    Progress/Outcomes (Oral Nutrition/Hydration Goal 1, SLP)  goal met  -AB       Oral Nutrition/Hydration Goal 2 (SLP)    Oral Nutrition/Hydration Goal 2, SLP  LTG: Pt will tolerate safest and least restrictive diet/liquids with no complications of aspiration.   -AB    Time Frame (Oral Nutrition/Hydration Goal 2, SLP)  by discharge  -AB    Progress/Outcomes (Oral Nutrition/Hydration Goal 2, SLP)  goal ongoing  -AB      User Key  (r) = Recorded By, (t) = Taken By, (c) = Cosigned By    Initials Name Provider Type    Dulce Luna, MS CCC-SLP Speech and Language Pathologist             Time Calculation:       Therapy Charges for Today     Code Description Service Date Service Provider Modifiers Qty    [id number] HC ST MOTION FLUORO EVAL SWALLOW 6 7/12/2021 Dulce Harrison, MS CCC-SLP GN 1          PPE:  Patient was wearing a face mask during this therapy encounter. The patient's mask was removed to allow po trials to be administered for purposes of this assessment. The patient's mask was replaced prior to being transported back up to their room. Therapist used appropriate personal protective equipment including mask, eye protection and gloves.  Mask used was standard procedure mask. Appropriate PPE was worn during the entire therapy session. Hand hygiene was completed before and after therapy session. Patient is not in enhanced droplet precautions.            Dulce Harrison MS CCC-SLP  7/12/2021

## 2021-07-12 NOTE — PLAN OF CARE
Goal Outcome Evaluation:  Pt denies any pain/discomfort this shift. Continues to strip clothes off. Remains on O2 @ 3 L NC. Pt has had two med BM with blood tinged. Will continue to monitor. Anticipates D/C back to [facility]. VSS.

## 2021-07-12 NOTE — PLAN OF CARE
Goal Outcome Evaluation:               Pt denies any pain/discomfort this shift. Continues to strip clothes off. Remains on O2 @ 3 lpm NC. NPO to have a VFSS in the AM from . IVF changed to D5 1/2 NS @ 75cc/hr.  @ [place]. No noted blood stools this shift so far. Pt was made a DNR by family today. Will continue to monitor. Anticipates D/C back to [facility]. VSS.

## 2021-07-13 PROBLEM — J69.0 ASPIRATION PNEUMONIA (HCC): Status: ACTIVE | Noted: 2021-01-01

## 2021-07-13 PROBLEM — R13.10 DYSPHAGIA: Status: ACTIVE | Noted: 2021-01-01

## 2021-07-13 NOTE — DISCHARGE SUMMARY
Date of Discharge:  7/13/2021    Discharge Diagnosis:   **Gastrointestinal hemorrhage [K92.2]   Aspiration pneumonia (CMS/HCC) [J69.0]   Dysphagia [R13.10]   Hypoglycemia [E16.2]   Essential hypertension [I10]   Seizure disorder (CMS/HCC) [G40.909]   Gastroesophageal reflux disease [K21.9]   Type 2 diabetes mellitus with hyperglycemia, with long-term current use of insulin (CMS/HCC) [E11.65, Z79.4]   Mixed hyperlipidemia [E78.2]   Atrial fibrillation (CMS/HCC) [I48.91]   Fatigue [R53.83]   Stage 3 chronic kidney disease (CMS/HCC) [N18.30]       Presenting Problem/History of Present Illness  Active Hospital Problems    Diagnosis  POA   • **Gastrointestinal hemorrhage [K92.2]  Yes   • Aspiration pneumonia (CMS/HCC) [J69.0]  Yes   • Dysphagia [R13.10]  Yes   • Hypoglycemia [E16.2]  No   • Essential hypertension [I10]  Yes   • Seizure disorder (CMS/HCC) [G40.909]  Yes   • Gastroesophageal reflux disease [K21.9]  Yes   • Type 2 diabetes mellitus with hyperglycemia, with long-term current use of insulin (CMS/HCC) [E11.65, Z79.4]  Not Applicable   • Mixed hyperlipidemia [E78.2]  Yes   • Atrial fibrillation (CMS/HCC) [I48.91]  Yes   • Fatigue [R53.83]  Yes   • Stage 3 chronic kidney disease (CMS/HCC) [N18.30]  Yes      Resolved Hospital Problems   No resolved problems to display.          Hospital Course  Patient is a 89 y.o. male   with history of aspiration pneumonia, dementia, chronic atrial fib,  previous GI blood loss who presented with rectal bleeding.  Hemoglobin was stable around 10 and he did not have any active bleeding after admission.  He was quite lethargic and noted to have right lower lobe infiltrate likely consistent with aspiration pneumonia.. Underwent a video swallow study that showed he does have some oral dysphagia.  Recommendation was for a puréed diet and thin liquids.. Patient had elevated creatinine above his baseline and was hydrated.  At the time of discharge his creatinine is at 2, which is his  baseline.  After discussion with both the gastroenterology service and family no further work-up for his rectal bleeding was pursued given the desire to focus on comfort.. He is off any anticoagulation due to GI bleeding.  Patient did have an elevated troponin which was likely due to his renal failure.  He had no complaints of chest pain and his troponin did not trend upward.     Patient improved with rehydration and treatment of his pneumonia.  At the time of discharge he is awake and able to feed himself.  He denies any complaints.  I discussed all of the above with his daughter, Poonam Franklin.  She is comfortable with his being discharged to [facility] later today.  He does remain a DNR.  If he is not able to participate in therapies or show any progress with skilled rehab they will consider either hospice care or taking him home with paid caregivers.     Procedures Performed         Consults:   Consults     Date and Time Order Name Status Description    7/10/2021  6:04 AM IP Consult to Family Practice Completed     6/20/2021 10:40 AM Inpatient Cardiology Consult Completed     6/20/2021  1:21 AM Hospitalist (on-call MD unless specified) Completed           Pertinent Test Results:    Lab Results (most recent)     Procedure Component Value Units Date/Time    POC Glucose Once [[id number]]  (Abnormal) Collected: 07/13/21 1123    Specimen: Blood Updated: 07/13/21 1124     Glucose 158 mg/dL      Comment: Serial Number: [id number]:  [id number]       POC Glucose Once [[id number]]  (Abnormal) Collected: 07/13/21 0751    Specimen: Blood Updated: 07/13/21 0752     Glucose 138 mg/dL      Comment: Serial Number: [id number]:  [id number]       Blood Culture - Blood, Hand, Left [[id number]] Collected: 07/10/21 0557    Specimen: Blood from Hand, Left Updated: 07/13/21 0601     Blood Culture No growth at 3 days    Blood Culture - Blood, Arm, Left [[id number]] Collected: 07/10/21 0557    Specimen: Blood from Arm, Left Updated:  07/13/21 0601     Blood Culture No growth at 3 days    Basic Metabolic Panel [[id number]]  (Abnormal) Collected: 07/13/21 0246    Specimen: Blood Updated: 07/13/21 0336     Glucose 156 mg/dL      BUN 36 mg/dL      Creatinine 2.12 mg/dL      Sodium 143 mmol/L      Potassium 4.0 mmol/L      Chloride 110 mmol/L      CO2 21.0 mmol/L      Calcium 8.0 mg/dL      eGFR Non African Amer 29 mL/min/1.73      BUN/Creatinine Ratio 17.0     Anion Gap 12.0 mmol/L     Narrative:      GFR Normal >60  Chronic Kidney Disease <60  Kidney Failure <15      Basic Metabolic Panel [[id number]]  (Abnormal) Collected: 07/12/21 0405    Specimen: Blood Updated: 07/12/21 0453     Glucose 115 mg/dL      BUN 38 mg/dL      Creatinine 2.11 mg/dL      Sodium 143 mmol/L      Potassium 4.8 mmol/L      Chloride 112 mmol/L      CO2 18.0 mmol/L      Calcium 8.2 mg/dL      eGFR Non African Amer 29 mL/min/1.73      BUN/Creatinine Ratio 18.0     Anion Gap 13.0 mmol/L     Narrative:      GFR Normal >60  Chronic Kidney Disease <60  Kidney Failure <15      CBC & Differential [[id number]]  (Abnormal) Collected: 07/12/21 0405    Specimen: Blood Updated: 07/12/21 0445    Narrative:      The following orders were created for panel order CBC & Differential.  Procedure                               Abnormality         Status                     ---------                               -----------         ------                     Scan Slide[[id number]]                                                                  CBC Auto Differential[[id number]]        Abnormal            Final result                 Please view results for these tests on the individual orders.    CBC Auto Differential [[id number]]  (Abnormal) Collected: 07/12/21 0405    Specimen: Blood Updated: 07/12/21 0445     WBC 7.10 10*3/mm3      RBC 4.59 10*6/mm3      Hemoglobin 9.9 g/dL      Hematocrit 33.5 %      MCV 73.2 fL      MCH 21.6 pg      MCHC 29.5 g/dL      RDW 24.7 %      RDW-SD 63.4 fl      MPV 8.9  fL      Platelets 204 10*3/mm3      Neutrophil % 75.4 %      Lymphocyte % 13.3 %      Monocyte % 7.6 %      Eosinophil % 2.4 %      Basophil % 1.3 %      Neutrophils, Absolute 5.40 10*3/mm3      Lymphocytes, Absolute 0.90 10*3/mm3      Monocytes, Absolute 0.50 10*3/mm3      Eosinophils, Absolute 0.20 10*3/mm3      Basophils, Absolute 0.10 10*3/mm3      nRBC 0.3 /100 WBC     Comprehensive Metabolic Panel [[id number]]  (Abnormal) Collected: 07/11/21 0329    Specimen: Blood Updated: 07/11/21 0412     Glucose 67 mg/dL      BUN 40 mg/dL      Creatinine 1.93 mg/dL      Sodium 143 mmol/L      Potassium 4.8 mmol/L      Chloride 111 mmol/L      CO2 23.0 mmol/L      Calcium 8.5 mg/dL      Total Protein 6.8 g/dL      Albumin 3.30 g/dL      ALT (SGPT) 13 U/L      AST (SGOT) 15 U/L      Alkaline Phosphatase 75 U/L      Total Bilirubin 0.9 mg/dL      eGFR Non African Amer 33 mL/min/1.73      Globulin 3.5 gm/dL      A/G Ratio 0.9 g/dL      BUN/Creatinine Ratio 20.7     Anion Gap 9.0 mmol/L     Narrative:      GFR Normal >60  Chronic Kidney Disease <60  Kidney Failure <15      Troponin [[id number]]  (Abnormal) Collected: 07/11/21 0329    Specimen: Blood Updated: 07/11/21 0412     Troponin T 0.083 ng/mL     Narrative:      Troponin T Reference Range:  <= 0.03 ng/mL-   Negative for AMI  >0.03 ng/mL-     Abnormal for myocardial necrosis.  Clinicians would have to utilize clinical acumen, EKG, Troponin and serial changes to determine if it is an Acute Myocardial Infarction or myocardial injury due to an underlying chronic condition.       Results may be falsely decreased if patient taking Biotin.      CK [[id number]]  (Normal) Collected: 07/11/21 0329    Specimen: Blood Updated: 07/11/21 0411     Creatine Kinase 66 U/L     Sodium, Urine, Random - Urine, Clean Catch [[id number]] Collected: 07/10/21 1516    Specimen: Urine, Clean Catch Updated: 07/10/21 1552     Sodium, Urine <20 mmol/L     Narrative:      Reference intervals for random  urine have not been established.  Clinical usage is dependent upon physician's interpretation in combination with other laboratory tests.       Lipase [[id number]]  (Abnormal) Collected: 07/10/21 1249    Specimen: Blood Updated: 07/10/21 1324     Lipase 9 U/L     Amylase [[id number]]  (Abnormal) Collected: 07/10/21 1249    Specimen: Blood Updated: 07/10/21 1324     Amylase 21 U/L     Respiratory Panel PCR w/COVID-19(SARS-CoV-2) DELORIS/RUDI/SORAIDA/PAD/COR/MAD/PADILLA In-House, NP Swab in UTM/VTM, 3-4 HR TAT - Swab, Nasopharynx [[id number]]  (Normal) Collected: 07/10/21 0536    Specimen: Swab from Nasopharynx Updated: 07/10/21 0632     ADENOVIRUS, PCR Not Detected     Coronavirus 229E Not Detected     Coronavirus HKU1 Not Detected     Coronavirus NL63 Not Detected     Coronavirus OC43 Not Detected     COVID19 Not Detected     Human Metapneumovirus Not Detected     Human Rhinovirus/Enterovirus Not Detected     Influenza A PCR Not Detected     Influenza B PCR Not Detected     Parainfluenza Virus 1 Not Detected     Parainfluenza Virus 2 Not Detected     Parainfluenza Virus 3 Not Detected     Parainfluenza Virus 4 Not Detected     RSV, PCR Not Detected     Bordetella pertussis pcr Not Detected     Bordetella parapertussis PCR Not Detected     Chlamydophila pneumoniae PCR Not Detected     Mycoplasma pneumo by PCR Not Detected    Narrative:      In the setting of a positive respiratory panel with a viral infection PLUS a negative procalcitonin without other underlying concern for bacterial infection, consider observing off antibiotics or discontinuation of antibiotics and continue supportive care. If the respiratory panel is positive for atypical bacterial infection (Bordetella pertussis, Chlamydophila pneumoniae, or Mycoplasma pneumoniae), consider antibiotic de-escalation to target atypical bacterial infection.    Procalcitonin [[id number]]  (Normal) Collected: 07/10/21 0517    Specimen: Blood Updated: 07/10/21 0547     Procalcitonin  "0.09 ng/mL     Narrative:      As a Marker for Sepsis (Non-Neonates):     1. <0.5 ng/mL represents a low risk of severe sepsis and/or septic shock.  2. >2 ng/mL represents a high risk of severe sepsis and/or septic shock.    As a Marker for Lower Respiratory Tract Infections that require antibiotic therapy:  PCT on Admission     Antibiotic Therapy             6-12 Hrs later  >0.5                          Strongly Recommended            >0.25 - <0.5             Recommended  0.1 - 0.25                  Discouraged                       Remeasure/reassess PCT  <0.1                         Strongly Discouraged         Remeasure/reassess PCT      As 28 day mortality risk marker: \"Change in Procalcitonin Result\" (>80% or <=80%) if Day 0 (or Day 1) and Day 4 values are available. Refer to http://www.[employer]-calculator.[facility]/    Change in PCT <=80 %   A decrease of PCT levels below or equal to 80% defines a positive change in PCT test result representing a higher risk for 28-day all-cause mortality of patients diagnosed with severe sepsis or septic shock.    Change in PCT >80 %   A decrease of PCT levels of more than 80% defines a negative change in PCT result representing a lower risk for 28-day all-cause mortality of patients diagnosed with severe sepsis or septic shock.              Results may be falsely decreased if patient taking Biotin.     Troponin [[id number]]  (Abnormal) Collected: 07/10/21 0517    Specimen: Blood Updated: 07/10/21 0542     Troponin T 0.086 ng/mL     Narrative:      Troponin T Reference Range:  <= 0.03 ng/mL-   Negative for AMI  >0.03 ng/mL-     Abnormal for myocardial necrosis.  Clinicians would have to utilize clinical acumen, EKG, Troponin and serial changes to determine if it is an Acute Myocardial Infarction or myocardial injury due to an underlying chronic condition.       Results may be falsely decreased if patient taking Biotin.      Comprehensive Metabolic Panel [[id number]]  (Abnormal) " Collected: 07/10/21 0517    Specimen: Blood Updated: 07/10/21 0541     Glucose 114 mg/dL      BUN 42 mg/dL      Creatinine 2.05 mg/dL      Sodium 138 mmol/L      Potassium 4.9 mmol/L      Chloride 107 mmol/L      CO2 21.0 mmol/L      Calcium 8.3 mg/dL      Total Protein 6.7 g/dL      Albumin 3.20 g/dL      ALT (SGPT) 13 U/L      AST (SGOT) 17 U/L      Alkaline Phosphatase 77 U/L      Total Bilirubin 0.7 mg/dL      eGFR Non African Amer 30 mL/min/1.73      Globulin 3.5 gm/dL      A/G Ratio 0.9 g/dL      BUN/Creatinine Ratio 20.5     Anion Gap 10.0 mmol/L     Narrative:      GFR Normal >60  Chronic Kidney Disease <60  Kidney Failure <15      BNP [[id number]]  (Abnormal) Collected: 07/10/21 0421    Specimen: Blood Updated: 07/10/21 0459     proBNP 18,999.0 pg/mL     Narrative:      Among patients with dyspnea, NT-proBNP is highly sensitive for the detection of acute congestive heart failure. In addition NT-proBNP of <300 pg/ml effectively rules out acute congestive heart failure with 99% negative predictive value.    Results may be falsely decreased if patient taking Biotin.      CBC & Differential [[id number]]  (Abnormal) Collected: 07/10/21 0421    Specimen: Blood Updated: 07/10/21 0441    Narrative:      The following orders were created for panel order CBC & Differential.  Procedure                               Abnormality         Status                     ---------                               -----------         ------                     Scan Slide[[id number]]                                                                  CBC Auto Differential[[id number]]        Abnormal            Final result                 Please view results for these tests on the individual orders.    CBC Auto Differential [[id number]]  (Abnormal) Collected: 07/10/21 0421    Specimen: Blood Updated: 07/10/21 0441     WBC 5.80 10*3/mm3      RBC 4.81 10*6/mm3      Hemoglobin 10.4 g/dL      Hematocrit 35.1 %      MCV 73.0 fL      MCH 21.6 pg       MCHC 29.6 g/dL      RDW 25.4 %      RDW-SD 66.1 fl      MPV 9.9 fL      Platelets 229 10*3/mm3      Neutrophil % 71.0 %      Lymphocyte % 13.8 %      Monocyte % 10.4 %      Eosinophil % 4.4 %      Basophil % 0.4 %      Neutrophils, Absolute 4.10 10*3/mm3      Lymphocytes, Absolute 0.80 10*3/mm3      Monocytes, Absolute 0.60 10*3/mm3      Eosinophils, Absolute 0.30 10*3/mm3      Basophils, Absolute 0.00 10*3/mm3      nRBC 0.3 /100 WBC     Protime-INR [[id number]]  (Abnormal) Collected: 07/10/21 0421    Specimen: Blood Updated: 07/10/21 0439     Protime 14.0 Seconds      INR 1.28    aPTT [[id number]]  (Normal) Collected: 07/10/21 0421    Specimen: Blood Updated: 07/10/21 0439     PTT 30.0 seconds            Results for orders placed during the hospital encounter of 06/19/21    Adult Transthoracic Echo Complete W/ Cont if Necessary Per Protocol    Interpretation Summary  · The left ventricular cavity is severely dilated.  · Left ventricular ejection fraction appears to be 21 - 25%.  · The right ventricular cavity is severely dilated.  · Severely reduced right ventricular systolic function noted.  · The right atrial cavity is moderately dilated.  · Moderate mitral valve regurgitation is present.  · Mild dilation of the aortic root is present.  · No pericardial effusion noted              Condition on Discharge: Frail, stable    Vital Signs  Temp:  [96.1 °F (35.6 °C)-97.7 °F (36.5 °C)] 96.1 °F (35.6 °C)  Heart Rate:  [76-98] 80  Resp:  [14-17] 14  BP: (110-133)/(68-88) 133/88    Physical Exam:     General Appearance:    Alert, cooperative, in no acute distress, appears stated age   Head:    Normocephalic, without obvious abnormality, atraumatic   Eyes:            Lids and lashes normal, conjunctivae and sclerae normal, no   icterus, no pallor, corneas clear, PERRLA   Ears:    Ears appear intact with no abnormalities noted   Throat:   No oral lesions, no thrush, oral mucosa moist   Neck:   No adenopathy, supple, trachea  midline, no thyromegaly, no   carotid bruit, no JVD   Lungs:    Scattered rhonchi, largely clears with coughing    Heart:   Regular rate and rhythm, normal S1 and S2, no            murmur, no gallop, no rub, no click   Chest Wall:    No abnormalities observed   Abdomen:     Normal bowel sounds, no masses, no organomegaly, soft        non-tender, non-distended, no guarding, no rebound                tenderness   Extremities:   Moves all extremities well, no edema, no cyanosis, no             redness   Pulses:   Pulses palpable and equal bilaterally   Skin:   No bleeding, bruising or rash   Lymph nodes:   No palpable adenopathy   Neurologic:   Cranial nerves 2 - 12 grossly intact, sensation intact, DTR       present and equal bilaterally       Discharge Disposition  Skilled Nursing Facility (DC - External)    Discharge Medications  Keppra XR 2000 mg po q day  Pantoprazole 40 mg po q day  Proscar 5 mg po q day  Bisoprolol 5 mg po q day  Lasix 40 mg po q day  Potassium 20 meq po q day  Lexapro 10 mg po q day    Discharge Diet:   Diet Instructions     Diet: Dysphagia; Thin Liquids, No Restrictions; Pureed      Discharge Diet: Dysphagia    Fluid Consistency: Thin Liquids, No Restrictions    Pureed Options: Pureed          Activity at Discharge: Up with assistance    Follow-up Appointments  No future appointments.  Additional Instructions for the Follow-ups that You Need to Schedule     Discharge Follow-up with PCP   As directed       Currently Documented PCP:    John Burleson MD    PCP Phone Number:    [phone]     Follow Up Details: Facility physician will resume care at [facility]               Test Results Pending at Discharge  Pending Labs     Order Current Status    Blood Culture - Blood, Arm, Left Preliminary result    Blood Culture - Blood, Hand, Left Preliminary result           Elaine Tapia MD  07/13/21  13:54 EDT    Time: Discharge 35 min

## 2021-07-13 NOTE — CONSULTS
Nutrition Services    Patient Name: Jim Montaño  YOB: 1926  MRN: [id number]  Admission date: 7/10/2021    PPE Documentation        PPE Worn By Provider Mask, Eye Protection    PPE Worn By Patient  None      NUTRITION SCREENING      Encounter Information: Visited patient this AM r/t MST score 2/PI, at time of documentation DC orders in place. At visit today patient sleepy, would fall asleep intermittently during interview. Patient reports generalized decreased appetite x 2-3 weeks in duration, and is unsure why. Denies N/V/D. Patient not able to answer weight hx questions.       PO Diet: Diet Texture; Pureed Diet   PO Supplements: None   PO Intake:  50-75% of meals since admission        Labs (reviewed below): Reviewed         GI Function:  + BM 7/12        Skin: Stg II PI to left gluteal        Weight Hx Review: General increase in weight noted since August of 2020. Body mass index is 27.58 kg/m². Patient visually appeared well nourished.        Nutrition Intervention: Will add magic cups to lunch/dinner tray in the event patient does not DC, will continue to follow if remains inpatient.        Results from last 7 days   Lab Units 07/13/21  0246 07/12/21  0405 07/11/21  0329 07/10/21  0517   SODIUM mmol/L 143 143 143 138   POTASSIUM mmol/L 4.0 4.8 4.8 4.9   CHLORIDE mmol/L 110* 112* 111* 107   CO2 mmol/L 21.0* 18.0* 23.0 21.0*   BUN mg/dL 36* 38* 40* 42*   CREATININE mg/dL 2.12* 2.11* 1.93* 2.05*   CALCIUM mg/dL 8.0* 8.2 8.5 8.3   BILIRUBIN mg/dL  --   --  0.9 0.7   ALK PHOS U/L  --   --  75 77   ALT (SGPT) U/L  --   --  13 13   AST (SGOT) U/L  --   --  15 17   GLUCOSE mg/dL 156* 115* 67 114*     Results from last 7 days   Lab Units 07/12/21  0405   HEMOGLOBIN g/dL 9.9*   HEMATOCRIT % 33.5*     COVID19   Date Value Ref Range Status   07/10/2021 Not Detected Not Detected - Ref. Range Final     Lab Results   Component Value Date    HGBA1C 7.1 (H) 05/04/2021       RD to follow up per  protocol.    Electronically signed by:  Karley Nagel RD  07/13/21 15:27 EDT

## 2021-07-13 NOTE — THERAPY TREATMENT NOTE
Acute Care - Speech Language Pathology   Swallow Treatment Note NELLI Tyshawn     Patient Name: Jim Montaño  : 1926  MRN: [id number]  Today's Date: 2021               Admit Date: 7/10/2021    Visit Dx:     ICD-10-CM ICD-9-CM   1. Gastrointestinal hemorrhage, unspecified gastrointestinal hemorrhage type  K92.2 578.9   2. Pleural effusion on right  J90 511.9     Patient Active Problem List   Diagnosis   • Type 2 diabetes mellitus with hyperglycemia, with long-term current use of insulin (CMS/HCC)   • Temporary cerebral vascular dysfunction   • Cerebrovascular accident (CVA) (CMS/HCC)   • Seizure disorder (CMS/HCC)   • Essential hypertension   • Mixed hyperlipidemia   • Gastroesophageal reflux disease   • Cough   • Colon cancer (CMS/HCC)   • Body mass index (BMI) of 29.0-29.9 in adult   • Atrial fibrillation (CMS/HCC)   • Adenocarcinoma of cecum (CMS/HCC)   • Hypokalemia   • Hemorrhoids   • Hemorrhage of rectum and anus   • Fatigue   • Upper respiratory infection   • Congestive heart failure (CMS/HCC)   • Chronic renal insufficiency, stage III (moderate) (CMS/HCC)   • Bradycardia   • Blood in urine   • Benign prostatic hyperplasia   • Hyperglycemia   • Stage 3 chronic kidney disease (CMS/HCC)   • Gastrointestinal hemorrhage   • Hypoglycemia   • Aspiration pneumonia (CMS/HCC)   • Dysphagia     Past Medical History:   Diagnosis Date   • A-fib (CMS/HCC)    • BPH (benign prostatic hyperplasia)    • Cancer (CMS/HCC)     colon   • Cardiomegaly    • CHF (congestive heart failure) (CMS/HCC)    • CKD (chronic kidney disease) stage 3, GFR 30-59 ml/min (CMS/HCC)    • Coronary artery disease    • Epilepsy (CMS/HCC)    • GERD (gastroesophageal reflux disease)    • Hyperlipidemia    • Hypertension    • OA (osteoarthritis)    • Pulmonary HTN (CMS/HCC)    • Type 2 diabetes mellitus (CMS/HCC)      Past Surgical History:   Procedure Laterality Date   • COLONOSCOPY N/A 3/14/2021    Procedure: COLONOSCOPY with endoscopic  sclerotherapy and endoscopic clipping;  Surgeon: Goldy Healy MD;  Location: [facility] ENDOSCOPY;  Service: Gastroenterology;  Laterality: N/A;  post: diverticulosis, post surgical changes   • PROSTATE SURGERY          SWALLOW EVALUATION (last 72 hours)      SLP Adult Swallow Evaluation     Row Name 07/13/21 1300       Rehab Evaluation    Document Type  therapy note (daily note)  -EC    Subjective Information  no complaints  -EC    Patient Observations  alert;cooperative;agree to therapy  -EC    Patient/Family/Caregiver Comments/Observations  --    Care Plan Review  evaluation/treatment results reviewed;care plan/treatment goals reviewed;patient/other agree to care plan  -EC    Patient Effort  good  -EC    Comment  Pt awake, alert upon entry, appears mildly lethargic though responds to cues for alertness. Pt feeds himself independently and functionally w/no anterior spillage. Pt w/gravelly vocal quality noted prior to and during PO though no overt s/s of aspiration at any time. Recommend continue current diet recommendations.  -EC    Symptoms Noted During/After Treatment  none  -EC       General Information    Current Method of Nutrition  thin liquids;pureed  -EC          Recommendations    Therapy Frequency (Swallow)  PRN  -EC    Predicted Duration Therapy Intervention (Days)  until discharge  -EC    SLP Diet Recommendation  puree;thin liquids  -EC    Recommended Diagnostics  --    Recommended Precautions and Strategies  upright posture during/after eating;small bites of food and sips of liquid;alternate between small bites of food and sips of liquid;assist with feeding  -EC    SLP Rec. for Method of Medication Administration  with thin liquids;meds whole;with pudding or applesauce  -EC    Monitor for Signs of Aspiration  yes;notify SLP if any concerns  -EC    Anticipated Discharge Disposition (SLP)  unknown;anticipate therapy at next level of care  -EC       Swallow Goals (SLP)    Oral Nutrition/Hydration  Goal Selection (SLP)  oral nutrition/hydration, SLP goal 1;oral nutrition/hydration, SLP goal 2  -EC       Oral Nutrition/Hydration Goal 1 (SLP)    Oral Nutrition/Hydration Goal 1, SLP  GOAL MET: Pt will have re-eval of swallow including instrumental swallow evaluation of indicated, within 48 hours.   -EC    Time Frame (Oral Nutrition/Hydration Goal 1, SLP)  --    Progress/Outcomes (Oral Nutrition/Hydration Goal 1, SLP)  --       Oral Nutrition/Hydration Goal 2 (SLP)    Oral Nutrition/Hydration Goal 2, SLP  LTG: Pt will tolerate safest and least restrictive diet/liquids with no complications of aspiration.   -EC    Time Frame (Oral Nutrition/Hydration Goal 2, SLP)  by discharge  -EC    Barriers (Oral Nutrition/Hydration Goal 2, SLP)  see above note  -EC    Progress/Outcomes (Oral Nutrition/Hydration Goal 2, SLP)  goal ongoing  -EC      User Key  (r) = Recorded By, (t) = Taken By, (c) = Cosigned By    Initials Name Effective Dates    AB Dulce Harrison, MS CCC-SLP 06/16/21 -     CP Ping Vidal SLP 06/16/21 -     EC Debi Nam 06/16/21 -           EDUCATION  The patient has been educated in the following areas:   Dysphagia (Swallowing Impairment).    SLP Recommendation and Plan     SLP Diet Recommendation: puree, thin liquids  Recommended Precautions and Strategies: upright posture during/after eating, small bites of food and sips of liquid, alternate between small bites of food and sips of liquid, assist with feeding  SLP Rec. for Method of Medication Administration: with thin liquids, meds whole, with pudding or applesauce     Monitor for Signs of Aspiration: yes, notify SLP if any concerns        Anticipated Discharge Disposition (SLP): unknown, anticipate therapy at next level of care     Therapy Frequency (Swallow): PRN  Predicted Duration Therapy Intervention (Days): until discharge                              SLP GOALS     Row Name 07/13/21 1300 07/12/21 1300 07/11/21 1800       Oral  Nutrition/Hydration Goal 1 (SLP)    Oral Nutrition/Hydration Goal 1, SLP  GOAL MET: Pt will have re-eval of swallow including instrumental swallow evaluation of indicated, within 48 hours.   -EC  GOAL MET: Pt will have re-eval of swallow including instrumental swallow evaluation of indicated, within 48 hours.   -AB  Pt will have re-eval of swallow including instrumental swallow evaluation of indicated, within 48 hours.   -CP    Time Frame (Oral Nutrition/Hydration Goal 1, SLP)  --  2 days  -AB  2 days  -CP    Progress/Outcomes (Oral Nutrition/Hydration Goal 1, SLP)  --  goal met  -AB  --       Oral Nutrition/Hydration Goal 2 (SLP)    Oral Nutrition/Hydration Goal 2, SLP  LTG: Pt will tolerate safest and least restrictive diet/liquids with no complications of aspiration.   -EC  LTG: Pt will tolerate safest and least restrictive diet/liquids with no complications of aspiration.   -AB  Pt will tolerate safest and least restrictive diet/liquids with no complications of aspiration.   -CP    Time Frame (Oral Nutrition/Hydration Goal 2, SLP)  by discharge  -EC  by discharge  -AB  by discharge  -CP    Barriers (Oral Nutrition/Hydration Goal 2, SLP)  see above note  -EC  --  --    Progress/Outcomes (Oral Nutrition/Hydration Goal 2, SLP)  goal ongoing  -EC  goal ongoing  -AB  --      User Key  (r) = Recorded By, (t) = Taken By, (c) = Cosigned By    Initials Name Provider Type    Dulce Luna, MS CCC-SLP Speech and Language Pathologist    Ping Earl, SLP Speech and Language Pathologist    Debi Gunderson Speech and Language Pathologist             Time Calculation:                Debi Nam  7/13/2021

## 2021-07-13 NOTE — PROGRESS NOTES
"                                                                                                                                      Nephrology  Progress Note                                        [facility]    Patient Identification    Name: Jim Montaño  Age: 89 y.o.  Sex: male  :  1926  MRN: [id number]      DATE OF SERVICE:  2021        Subective    No new complaints     Objective   Scheduled Meds:Barium Sulfate, 1 teaspoon(s), Oral, Once in imaging  barium sulfate, 50 mL, Oral, Once in imaging  barium sulfate, 50 mL, Oral, Once in imaging  escitalopram, 10 mg, Oral, Daily  finasteride, 5 mg, Oral, Daily  levETIRAcetam XR, 2,000 mg, Oral, Daily  pantoprazole, 40 mg, Oral, Q AM  piperacillin-tazobactam, 3.375 g, Intravenous, Q8H  sodium chloride, 3 mL, Intravenous, Q12H          Continuous Infusions:dextrose 5 % and sodium chloride 0.45 %, 125 mL/hr, Last Rate: 125 mL/hr (21 1010)  Pharmacy to Dose Zosyn,         PRN Meds:•  acetaminophen  •  metoprolol tartrate  •  ondansetron  •  Pharmacy to Dose Zosyn  •  sodium chloride     Exam:  /81 (BP Location: Right arm, Patient Position: Lying)   Pulse 84   Temp 97.3 °F (36.3 °C) (Oral)   Resp 15   Ht 180.3 cm (71\")   Wt 89.7 kg (197 lb 12 oz)   SpO2 94%   BMI 27.58 kg/m²     Intake/Output last 3 shifts:  I/O last 3 completed shifts:  In: 2440.4 [P.O.:120; I.V.:2120.4; IV Piggyback:200]  Out: -     Intake/Output this shift:  No intake/output data recorded.    Physical exam:  General Appearance:  Alert  Head:  Normocephalic, without obvious abnormality, atraumatic  Eyes:  PERRL, conjunctiva/corneas clear     Neck:  Supple,  no adenopathy;      Lungs:  Decreased BS occasion ronchi  Heart:  Regular rate and rhythm, S1 and S2 normal  Abdomen:  Soft, non-tender, bowel sounds active   Extremities: trace edema  Pulses: 2+ and symmetric all extremities  Skin:  No rashes or lesions       Data Review:  All labs (24hrs):   "   Recent Results (from the past 24 hour(s))   POC Glucose Once    Collection Time: 07/12/21 11:52 AM    Specimen: Blood   Result Value Ref Range    Glucose 107 (H) 70 - 105 mg/dL   POC Glucose Once    Collection Time: 07/12/21  5:05 PM    Specimen: Blood   Result Value Ref Range    Glucose 115 (H) 70 - 105 mg/dL   POC Glucose Once    Collection Time: 07/12/21 10:12 PM    Specimen: Blood   Result Value Ref Range    Glucose 205 (H) 70 - 105 mg/dL   Basic Metabolic Panel    Collection Time: 07/13/21  2:46 AM    Specimen: Blood   Result Value Ref Range    Glucose 156 (H) 65 - 99 mg/dL    BUN 36 (H) 8 - 23 mg/dL    Creatinine 2.12 (H) 0.76 - 1.27 mg/dL    Sodium 143 136 - 145 mmol/L    Potassium 4.0 3.5 - 5.2 mmol/L    Chloride 110 (H) 98 - 107 mmol/L    CO2 21.0 (L) 22.0 - 29.0 mmol/L    Calcium 8.0 (L) 8.2 - 9.6 mg/dL    eGFR Non African Amer 29 (L) >60 mL/min/1.73    BUN/Creatinine Ratio 17.0 7.0 - 25.0    Anion Gap 12.0 5.0 - 15.0 mmol/L   POC Glucose Once    Collection Time: 07/13/21  7:51 AM    Specimen: Blood   Result Value Ref Range    Glucose 138 (H) 70 - 105 mg/dL          Imaging:  [unfilled]    Assessment/Plan:     Gastrointestinal hemorrhage       Acute kidney injury likely secondary to prerenal etiology from GI bleed  Metabolic acidosis  Noted right pleural effusion  Possible pneumonia  Anemia  GI bleed  Elevated troponin  Elevated BNP  Ascites  Diverticulosis without diverticulitis  Hepatic cysts  Renal cyst  Chronic pancreatitis     Creatinine 2.1 from 1.9  Watch potassium 4.8  Continue gentle hydration  Increase IVfs to 125 cc/hr

## 2021-07-13 NOTE — CASE MANAGEMENT/SOCIAL WORK
Case Management Discharge Note      Final Note: Return to [facility]. DC orders in at this time. CM updated [facility] liaison.       Selected Continued Care - Admitted Since 7/10/2021     Destination Coordination complete    Service Provider Selected Services Address Phone Fax Patient Preferred    [facility] [address]  [place] [address] [phone] --           Final Discharge Disposition Code: 03 - skilled nursing facility (SNF)

## 2021-07-13 NOTE — PLAN OF CARE
Goal Outcome Evaluation:  Plan of Care Reviewed With: patient           Outcome Summary: pt discharged to [facility]. family made aware about transfer.

## 2021-07-13 NOTE — PLAN OF CARE
Pt resting. Pt being turned every two hours. Pt on 2L O2 nasal canula. Pt to return to [facility] when appropriate. Will continue to monitor.     Problem: Adult Inpatient Plan of Care  Goal: Plan of Care Review  Outcome: Ongoing, Not Progressing  Flowsheets (Taken 7/13/2021 0127)  Progress: no change  Plan of Care Reviewed With: patient

## 2024-02-03 NOTE — PROGRESS NOTES
Discharge Planning Assessment  [facility]     Patient Name: Jim Montaño  MRN: [id number]  Today's Date: 8/25/2020    Admit Date: 8/24/2020    Discharge Needs Assessment     Row Name 08/25/20 1641       Living Environment    Lives With  alone    Unique Family Situation  [facility].    Current Living Arrangements  independent/assisted living facility    Duration at Residence  7 yrs    Primary Care Provided by  self    Provides Primary Care For  no one    Family Caregiver Names  Pt has sister, son and son's wife nearby.    Able to Return to Prior Arrangements  yes       Resource/Environmental Concerns    Resource/Environmental Concerns  none       Transition Planning    Patient/Family Anticipates Transition to  home    Patient/Family Anticipated Services at Transition  none    Transportation Anticipated  family or friend will provide       Discharge Needs Assessment    Readmission Within the Last 30 Days  no previous admission in last 30 days    Concerns to be Addressed  denies needs/concerns at this time    Concerns Comments  Pt has pvt caregiver [facility] service 6 days per week.    Equipment Currently Used at Home  oxygen;walker, rolling O2 from [employer] and does not use it.    Anticipated Changes Related to Illness  none    Equipment Needed After Discharge  none    Discharge Coordination/Progress  DC Plan: Return home to [facility], pvt caregivers 6 days/wk.         Discharge Plan     Row Name 08/25/20 1645       Plan    Plan  DC Plan: Return home to [facility], pvt caregivers 6 days/wk.     Plan Comments  Pending endocrinology consult.                  Demographic Summary     Row Name 08/25/20 164       General Information    Admission Type  observation    Arrived From  emergency department    Required Notices Provided  Observation Status Notice    Referral Source  admission list    Reason for Consult  discharge planning    Preferred Language   English     Used During This Interaction  susy Reynolds RN     Not applicable

## (undated) DEVICE — THE CARR-LOCKE INJECTION NEEDLE IS A SINGLE USE, DISPOSABLE, FLEXIBLE SHEATH INJECTION NEEDLE USED FOR THE INJECTION OF VARIOUS TYPES OF MEDIA THROUGH FLEXIBLE ENDOSCOPES.

## (undated) DEVICE — PK ENDO GI 50